# Patient Record
Sex: MALE | Race: OTHER
[De-identification: names, ages, dates, MRNs, and addresses within clinical notes are randomized per-mention and may not be internally consistent; named-entity substitution may affect disease eponyms.]

---

## 2017-01-04 ENCOUNTER — HOSPITAL ENCOUNTER (OUTPATIENT)
Age: 27
End: 2017-01-04

## 2017-01-06 ENCOUNTER — HOSPITAL ENCOUNTER (OUTPATIENT)
Age: 27
Discharge: ANOTHER HOSPITAL | End: 2017-01-06

## 2017-01-06 ENCOUNTER — HOSPITAL ENCOUNTER (INPATIENT)
Age: 27
LOS: 4 days | Discharge: HOME | DRG: 641 | End: 2017-01-10
Payer: MEDICAID

## 2017-01-06 DIAGNOSIS — E86.0: ICD-10-CM

## 2017-01-06 DIAGNOSIS — I44.1: ICD-10-CM

## 2017-01-06 DIAGNOSIS — E66.3: ICD-10-CM

## 2017-01-06 DIAGNOSIS — N18.9: ICD-10-CM

## 2017-01-06 DIAGNOSIS — Z91.128: ICD-10-CM

## 2017-01-06 DIAGNOSIS — F17.210: ICD-10-CM

## 2017-01-06 DIAGNOSIS — F41.8: ICD-10-CM

## 2017-01-06 DIAGNOSIS — Z59.0: ICD-10-CM

## 2017-01-06 DIAGNOSIS — E87.6: Primary | ICD-10-CM

## 2017-01-06 DIAGNOSIS — T50.996A: ICD-10-CM

## 2017-01-06 DIAGNOSIS — E87.1: ICD-10-CM

## 2017-01-06 DIAGNOSIS — N17.9: ICD-10-CM

## 2017-01-06 SDOH — ECONOMIC STABILITY - HOUSING INSECURITY: HOMELESSNESS: Z59.0

## 2017-01-31 ENCOUNTER — HOSPITAL ENCOUNTER (OUTPATIENT)
Age: 27
Discharge: ANOTHER HOSPITAL | End: 2017-01-31
Payer: MEDICAID

## 2017-02-24 ENCOUNTER — HOSPITAL ENCOUNTER (INPATIENT)
Age: 27
LOS: 2 days | Discharge: HOME | DRG: 641 | End: 2017-02-26
Payer: MEDICAID

## 2017-02-24 DIAGNOSIS — D72.829: ICD-10-CM

## 2017-02-24 DIAGNOSIS — Z59.0: ICD-10-CM

## 2017-02-24 DIAGNOSIS — Z91.14: ICD-10-CM

## 2017-02-24 DIAGNOSIS — F34.1: ICD-10-CM

## 2017-02-24 DIAGNOSIS — E87.6: Primary | ICD-10-CM

## 2017-02-24 DIAGNOSIS — Z87.891: ICD-10-CM

## 2017-02-24 SDOH — ECONOMIC STABILITY - HOUSING INSECURITY: HOMELESSNESS: Z59.0

## 2017-03-11 ENCOUNTER — HOSPITAL ENCOUNTER (INPATIENT)
Age: 27
Discharge: LEFT BEFORE DISCHARGE | DRG: 700 | End: 2017-03-11
Payer: MEDICAID
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
## 2017-03-11 DIAGNOSIS — Z91.19: ICD-10-CM

## 2017-03-11 DIAGNOSIS — T50.3X6A: ICD-10-CM

## 2017-03-11 DIAGNOSIS — F17.210: ICD-10-CM

## 2017-03-11 DIAGNOSIS — F12.10: ICD-10-CM

## 2017-03-11 DIAGNOSIS — Z59.0: ICD-10-CM

## 2017-03-11 DIAGNOSIS — F41.9: ICD-10-CM

## 2017-03-11 DIAGNOSIS — N25.89: Primary | ICD-10-CM

## 2017-03-11 DIAGNOSIS — Z79.899: ICD-10-CM

## 2017-03-11 DIAGNOSIS — F32.9: ICD-10-CM

## 2017-03-11 DIAGNOSIS — F60.9: ICD-10-CM

## 2017-03-11 DIAGNOSIS — Z91.138: ICD-10-CM

## 2017-03-11 SDOH — ECONOMIC STABILITY - HOUSING INSECURITY: HOMELESSNESS: Z59.0

## 2017-03-19 ENCOUNTER — HOSPITAL ENCOUNTER (INPATIENT)
Age: 27
LOS: 1 days | Discharge: HOME | DRG: 700 | End: 2017-03-20
Payer: MEDICAID

## 2017-03-19 DIAGNOSIS — Z91.138: ICD-10-CM

## 2017-03-19 DIAGNOSIS — F41.9: ICD-10-CM

## 2017-03-19 DIAGNOSIS — T50.3X6A: ICD-10-CM
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
## 2017-03-19 DIAGNOSIS — F17.200: ICD-10-CM

## 2017-03-19 DIAGNOSIS — F32.9: ICD-10-CM

## 2017-03-19 DIAGNOSIS — D72.829: ICD-10-CM

## 2017-03-19 DIAGNOSIS — Z59.0: ICD-10-CM

## 2017-03-19 DIAGNOSIS — N25.89: Primary | ICD-10-CM

## 2017-03-19 SDOH — ECONOMIC STABILITY - HOUSING INSECURITY: HOMELESSNESS: Z59.0

## 2017-03-30 ENCOUNTER — HOSPITAL ENCOUNTER (OUTPATIENT)
Age: 27
Setting detail: OBSERVATION
LOS: 1 days | Discharge: LEFT BEFORE DISCHARGE | End: 2017-03-31
Payer: MEDICAID

## 2017-03-30 DIAGNOSIS — F17.200: ICD-10-CM

## 2017-03-30 DIAGNOSIS — F41.9: ICD-10-CM

## 2017-03-30 DIAGNOSIS — E26.81: ICD-10-CM

## 2017-03-30 DIAGNOSIS — E87.6: Primary | ICD-10-CM

## 2017-03-30 DIAGNOSIS — Z59.0: ICD-10-CM

## 2017-03-30 DIAGNOSIS — Z53.20: ICD-10-CM

## 2017-03-30 DIAGNOSIS — F33.3: ICD-10-CM

## 2017-03-30 PROCEDURE — 93005 ELECTROCARDIOGRAM TRACING: CPT

## 2017-03-30 PROCEDURE — 96365 THER/PROPH/DIAG IV INF INIT: CPT

## 2017-03-30 PROCEDURE — 85025 COMPLETE CBC W/AUTO DIFF WBC: CPT

## 2017-03-30 PROCEDURE — 96372 THER/PROPH/DIAG INJ SC/IM: CPT

## 2017-03-30 PROCEDURE — 80048 BASIC METABOLIC PNL TOTAL CA: CPT

## 2017-03-30 PROCEDURE — 99284 EMERGENCY DEPT VISIT MOD MDM: CPT

## 2017-03-30 PROCEDURE — 80053 COMPREHEN METABOLIC PANEL: CPT

## 2017-03-30 PROCEDURE — 82550 ASSAY OF CK (CPK): CPT

## 2017-03-30 PROCEDURE — 85027 COMPLETE CBC AUTOMATED: CPT

## 2017-03-30 PROCEDURE — 81003 URINALYSIS AUTO W/O SCOPE: CPT

## 2017-03-30 PROCEDURE — 96366 THER/PROPH/DIAG IV INF ADDON: CPT

## 2017-03-30 PROCEDURE — 83690 ASSAY OF LIPASE: CPT

## 2017-03-30 PROCEDURE — 99285 EMERGENCY DEPT VISIT HI MDM: CPT

## 2017-03-30 PROCEDURE — 83735 ASSAY OF MAGNESIUM: CPT

## 2017-03-30 PROCEDURE — 93010 ELECTROCARDIOGRAM REPORT: CPT

## 2017-03-30 PROCEDURE — 84100 ASSAY OF PHOSPHORUS: CPT

## 2017-03-30 PROCEDURE — 96375 TX/PRO/DX INJ NEW DRUG ADDON: CPT

## 2017-03-30 PROCEDURE — 80306 DRUG TEST PRSMV INSTRMNT: CPT

## 2017-03-30 PROCEDURE — 36415 COLL VENOUS BLD VENIPUNCTURE: CPT

## 2017-03-30 SDOH — ECONOMIC STABILITY - HOUSING INSECURITY: HOMELESSNESS: Z59.0

## 2017-04-03 ENCOUNTER — HOSPITAL ENCOUNTER (OUTPATIENT)
Age: 27
Discharge: ANOTHER HOSPITAL | End: 2017-04-03
Payer: MEDICAID

## 2017-04-03 ENCOUNTER — HOSPITAL ENCOUNTER (INPATIENT)
Age: 27
Discharge: LEFT BEFORE DISCHARGE | DRG: 700 | End: 2017-04-03
Payer: MEDICAID

## 2017-04-03 DIAGNOSIS — N25.89: Primary | ICD-10-CM

## 2017-04-03 DIAGNOSIS — T50.3X6A: ICD-10-CM

## 2017-04-03 DIAGNOSIS — F41.9: ICD-10-CM

## 2017-04-03 DIAGNOSIS — F17.200: ICD-10-CM

## 2017-04-03 DIAGNOSIS — F12.10: ICD-10-CM

## 2017-04-03 DIAGNOSIS — M79.604: ICD-10-CM

## 2017-04-03 DIAGNOSIS — F32.9: ICD-10-CM
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
## 2017-04-03 DIAGNOSIS — M79.605: Primary | ICD-10-CM

## 2017-04-03 DIAGNOSIS — Z79.899: ICD-10-CM

## 2017-04-03 DIAGNOSIS — Z91.19: ICD-10-CM

## 2017-04-03 DIAGNOSIS — Z59.0: ICD-10-CM

## 2017-04-03 DIAGNOSIS — R29.898: ICD-10-CM

## 2017-04-03 SDOH — ECONOMIC STABILITY - HOUSING INSECURITY: HOMELESSNESS: Z59.0

## 2017-05-02 ENCOUNTER — HOSPITAL ENCOUNTER (INPATIENT)
Age: 27
LOS: 1 days | Discharge: HOME | DRG: 699 | End: 2017-05-03
Payer: MEDICAID

## 2017-05-02 DIAGNOSIS — N25.89: Primary | ICD-10-CM

## 2017-05-02 DIAGNOSIS — Z91.138: ICD-10-CM

## 2017-05-02 DIAGNOSIS — E26.81: ICD-10-CM

## 2017-05-02 DIAGNOSIS — T50.0X6A: ICD-10-CM

## 2017-05-02 DIAGNOSIS — Z87.891: ICD-10-CM

## 2017-05-02 DIAGNOSIS — T50.3X6A: ICD-10-CM

## 2017-05-02 DIAGNOSIS — F41.8: ICD-10-CM

## 2017-05-02 DIAGNOSIS — Z59.0: ICD-10-CM

## 2017-05-02 DIAGNOSIS — M62.82: ICD-10-CM

## 2017-05-02 DIAGNOSIS — R29.0: ICD-10-CM

## 2017-05-02 SDOH — ECONOMIC STABILITY - HOUSING INSECURITY: HOMELESSNESS: Z59.0

## 2017-05-10 ENCOUNTER — HOSPITAL ENCOUNTER (OUTPATIENT)
Dept: HOSPITAL 76 - EMS | Age: 27
Discharge: ANOTHER HOSPITAL | End: 2017-05-10
Attending: SURGERY
Payer: MEDICAID

## 2017-05-10 ENCOUNTER — HOSPITAL ENCOUNTER (INPATIENT)
Age: 27
LOS: 1 days | Discharge: LEFT BEFORE DISCHARGE | DRG: 641 | End: 2017-05-11
Payer: MEDICAID

## 2017-05-10 DIAGNOSIS — Z91.14: ICD-10-CM

## 2017-05-10 DIAGNOSIS — F32.9: ICD-10-CM

## 2017-05-10 DIAGNOSIS — Z87.891: ICD-10-CM

## 2017-05-10 DIAGNOSIS — R25.2: ICD-10-CM

## 2017-05-10 DIAGNOSIS — F41.9: ICD-10-CM

## 2017-05-10 DIAGNOSIS — F39: ICD-10-CM

## 2017-05-10 DIAGNOSIS — R07.9: Primary | ICD-10-CM

## 2017-05-10 DIAGNOSIS — E87.6: Primary | ICD-10-CM

## 2017-05-10 DIAGNOSIS — N25.89: ICD-10-CM

## 2017-05-10 DIAGNOSIS — D72.829: ICD-10-CM

## 2017-05-10 DIAGNOSIS — Z91.19: ICD-10-CM

## 2017-05-10 DIAGNOSIS — Z59.0: ICD-10-CM

## 2017-05-10 SDOH — ECONOMIC STABILITY - HOUSING INSECURITY: HOMELESSNESS: Z59.0

## 2017-05-17 ENCOUNTER — HOSPITAL ENCOUNTER (OUTPATIENT)
Dept: HOSPITAL 76 - EMS | Age: 27
End: 2017-05-17
Attending: SURGERY
Payer: MEDICAID

## 2017-05-17 DIAGNOSIS — Z03.89: Primary | ICD-10-CM

## 2017-05-23 ENCOUNTER — HOSPITAL ENCOUNTER (OUTPATIENT)
Dept: HOSPITAL 76 - ED | Age: 27
Setting detail: OBSERVATION
LOS: 1 days | Discharge: LEFT BEFORE DISCHARGE | End: 2017-05-24
Attending: INTERNAL MEDICINE | Admitting: SPECIALIST
Payer: MEDICAID

## 2017-05-23 DIAGNOSIS — Z82.49: ICD-10-CM

## 2017-05-23 DIAGNOSIS — Z87.891: ICD-10-CM

## 2017-05-23 DIAGNOSIS — N25.89: Primary | ICD-10-CM

## 2017-05-23 DIAGNOSIS — Z53.20: ICD-10-CM

## 2017-05-23 DIAGNOSIS — F32.9: ICD-10-CM

## 2017-05-23 DIAGNOSIS — Z83.3: ICD-10-CM

## 2017-05-23 DIAGNOSIS — Z59.0: ICD-10-CM

## 2017-05-23 DIAGNOSIS — Z91.14: ICD-10-CM

## 2017-05-23 DIAGNOSIS — F34.0: ICD-10-CM

## 2017-05-23 DIAGNOSIS — F41.9: ICD-10-CM

## 2017-05-23 LAB
ALBUMIN/GLOB SERPL: 1 {RATIO} (ref 1–2.2)
ANION GAP SERPL CALCULATED.4IONS-SCNC: 10 MMOL/L (ref 6–13)
ANION GAP SERPL CALCULATED.4IONS-SCNC: 12 MMOL/L (ref 6–13)
BASOPHILS NFR BLD AUTO: 0.1 10^3/UL (ref 0–0.1)
BASOPHILS NFR BLD AUTO: 0.4 %
BILIRUB BLD-MCNC: 0.3 MG/DL (ref 0.2–1)
BUN SERPL-MCNC: 12 MG/DL (ref 6–20)
CALCIUM UR-MCNC: 8.8 MG/DL (ref 8.5–10.3)
CHLORIDE SERPL-SCNC: 97 MMOL/L (ref 101–111)
CHLORIDE SERPL-SCNC: 98 MMOL/L (ref 101–111)
CO2 SERPL-SCNC: 25 MMOL/L (ref 21–32)
CO2 SERPL-SCNC: 26 MMOL/L (ref 21–32)
CREAT SERPLBLD-SCNC: 1 MG/DL (ref 0.6–1.2)
CUL URINE ADD CHARGE: (no result)
EOSINOPHIL # BLD AUTO: 0.5 10^3/UL (ref 0–0.7)
EOSINOPHIL NFR BLD AUTO: 2.6 %
ERYTHROCYTE [DISTWIDTH] IN BLOOD BY AUTOMATED COUNT: 13.7 % (ref 12–15)
GFRSERPLBLD MDRD-ARVRAT: 88 ML/MIN/{1.73_M2} (ref 89–?)
GLOBULIN SER-MCNC: 3.9 G/DL (ref 2.1–4.2)
GLUCOSE SERPL-MCNC: 104 MG/DL (ref 70–100)
HCT VFR BLD AUTO: 48.2 % (ref 42–52)
HGB UR QL STRIP: 16.9 G/DL (ref 14–18)
LIPASE SERPL-CCNC: 49 U/L (ref 22–51)
LYMPHOCYTES # SPEC AUTO: 3.1 10^3/UL (ref 1.5–3.5)
LYMPHOCYTES NFR BLD AUTO: 16.2 %
MAGNESIUM SERPL-MCNC: 2.1 MG/DL (ref 1.7–2.8)
MCH RBC QN AUTO: 29.1 PG (ref 27–31)
MCHC RBC AUTO-ENTMCNC: 35 G/DL (ref 32–36)
MCV RBC AUTO: 83.2 FL (ref 80–94)
MONOCYTES # BLD AUTO: 1.2 10^3/UL (ref 0–1)
MONOCYTES NFR BLD AUTO: 6.3 %
NEUTROPHILS # BLD AUTO: 14.1 10^3/UL (ref 1.5–6.6)
NEUTROPHILS # SNV AUTO: 18.9 X10^3/UL (ref 4.8–10.8)
NEUTROPHILS NFR BLD AUTO: 74.5 %
NRBC # BLD AUTO: 0 /100WBC
PDW BLD AUTO: 7.4 FL (ref 7.4–11.4)
PH UR STRIP.AUTO: 7 PH (ref 5–7.5)
PHOSPHATE BLD-MCNC: 1.5 MG/DL (ref 2.5–4.6)
POTASSIUM SERPL-SCNC: 1.9 MMOL/L (ref 3.5–5)
POTASSIUM SERPL-SCNC: < 1.5 MMOL/L (ref 3.5–5)
PROT SPEC-MCNC: 7.9 G/DL (ref 6.7–8.2)
RBC MAR: 5.79 10^6/UL (ref 4.7–6.1)
SODIUM SERPLBLD-SCNC: 133 MMOL/L (ref 135–145)
SODIUM SERPLBLD-SCNC: 135 MMOL/L (ref 135–145)
SP GR UR STRIP.AUTO: 1.01 (ref 1–1.03)
UR CULTURE IF IND: (no result)
UROBILINOGEN UR STRIP.AUTO-MCNC: NEGATIVE MG/DL
WBC # BLD: 18.9 X10^3/UL
WBC # UR MANUAL: (no result) /HPF (ref 0–3)

## 2017-05-23 PROCEDURE — 84100 ASSAY OF PHOSPHORUS: CPT

## 2017-05-23 PROCEDURE — 80051 ELECTROLYTE PANEL: CPT

## 2017-05-23 PROCEDURE — 82550 ASSAY OF CK (CPK): CPT

## 2017-05-23 PROCEDURE — 83735 ASSAY OF MAGNESIUM: CPT

## 2017-05-23 PROCEDURE — 83690 ASSAY OF LIPASE: CPT

## 2017-05-23 PROCEDURE — 96365 THER/PROPH/DIAG IV INF INIT: CPT

## 2017-05-23 PROCEDURE — 96376 TX/PRO/DX INJ SAME DRUG ADON: CPT

## 2017-05-23 PROCEDURE — 99285 EMERGENCY DEPT VISIT HI MDM: CPT

## 2017-05-23 PROCEDURE — 82040 ASSAY OF SERUM ALBUMIN: CPT

## 2017-05-23 PROCEDURE — 87150 DNA/RNA AMPLIFIED PROBE: CPT

## 2017-05-23 PROCEDURE — 80053 COMPREHEN METABOLIC PANEL: CPT

## 2017-05-23 PROCEDURE — 99284 EMERGENCY DEPT VISIT MOD MDM: CPT

## 2017-05-23 PROCEDURE — 93005 ELECTROCARDIOGRAM TRACING: CPT

## 2017-05-23 PROCEDURE — 85025 COMPLETE CBC W/AUTO DIFF WBC: CPT

## 2017-05-23 PROCEDURE — 81001 URINALYSIS AUTO W/SCOPE: CPT

## 2017-05-23 PROCEDURE — 82553 CREATINE MB FRACTION: CPT

## 2017-05-23 PROCEDURE — 96367 TX/PROPH/DG ADDL SEQ IV INF: CPT

## 2017-05-23 PROCEDURE — 96366 THER/PROPH/DIAG IV INF ADDON: CPT

## 2017-05-23 PROCEDURE — 96375 TX/PRO/DX INJ NEW DRUG ADDON: CPT

## 2017-05-23 PROCEDURE — 80048 BASIC METABOLIC PNL TOTAL CA: CPT

## 2017-05-23 PROCEDURE — 36415 COLL VENOUS BLD VENIPUNCTURE: CPT

## 2017-05-23 PROCEDURE — 87086 URINE CULTURE/COLONY COUNT: CPT

## 2017-05-23 RX ADMIN — SPIRONOLACTONE SCH MG: 25 TABLET, FILM COATED ORAL at 21:57

## 2017-05-23 RX ADMIN — POTASSIUM CITRATE SCH MEQ: 5 TABLET ORAL at 21:59

## 2017-05-23 RX ADMIN — CITALOPRAM HYDROBROMIDE SCH MG: 10 TABLET ORAL at 21:56

## 2017-05-23 RX ADMIN — PROPRANOLOL HYDROCHLORIDE SCH MG: 10 TABLET ORAL at 21:56

## 2017-05-23 RX ADMIN — SODIUM CHLORIDE, PRESERVATIVE FREE SCH ML: 5 INJECTION INTRAVENOUS at 21:59

## 2017-05-23 RX ADMIN — POTASSIUM CHLORIDE SCH MLS/HR: 7.46 INJECTION, SOLUTION INTRAVENOUS at 22:14

## 2017-05-23 RX ADMIN — POTASSIUM CHLORIDE SCH MLS/HR: 7.46 INJECTION, SOLUTION INTRAVENOUS at 23:12

## 2017-05-23 SDOH — ECONOMIC STABILITY - HOUSING INSECURITY: HOMELESSNESS: Z59.0

## 2017-05-23 NOTE — ED PHYSICIAN DOCUMENTATION
History of Present Illness





- Stated complaint


Stated Complaint: LEG WEAKNESS





- Chief complaint


Chief Complaint: General





- History obtained from


History obtained from: Patient





- History of Present Illness


Timing: Other (27-year-old gentleman with potassium wasting renal tubular 

acidosis presents again with leg weakness and pain consistent with low 

potassium.  He hasn't taken any home potassium today and missed one dose 

yesterday he says.  He's had some loose stools without overt diarrhea which may 

have been contributory.)





Review of Systems


Ten Systems: 10 systems reviewed and negative


Constitutional: denies: Fever, Chills


Cardiac: denies: Chest pain / pressure, Palpitations


Respiratory: denies: Dyspnea, Cough





PD PAST MEDICAL HISTORY





- Past Medical History


Cardiovascular: None


Respiratory: None


Neuro: None


Endocrine/Autoimmune: None


GI: None


: None


HEENT: None


Psych: Depression, Anxiety


Musculoskeletal: Fatigue


Derm: None, Other


Other Past Medical History: Chronic low potassium from renal tubular acidocis





- Past Surgical History


Past Surgical History: No





- Present Medications


Home Medications: 


 Ambulatory Orders











 Medication  Instructions  Recorded  Confirmed


 


Trazodone HCl 50 mg PO QPM PRN 06/13/16 05/23/17


 


Risperidone [Risperdal] 2 mg PO QPM 30 Days 02/26/17 05/23/17


 


Spironolactone 25 mg PO BID 30 Days 03/20/17 05/23/17


 


Buspirone HCl 5 mg PO BID 05/03/17 05/23/17


 


Citalopram Hydrobromide 20 mg PO BID 05/03/17 05/23/17





[Citalopram HBr]   


 


HYDROcod/ACETAM 5/325 [Norco 5/325] 1 tab PO Q6HR PRN 05/03/17 05/23/17


 


Lorazepam 0.5 mg PO TID PRN 05/03/17 05/23/17


 


Propranolol HCl 10 mg PO BID 05/03/17 05/23/17


 


Potassium Chloride 120 meq PO QID 05/11/17 05/23/17














- Allergies


Allergies/Adverse Reactions: 


 Allergies











Allergy/AdvReac Type Severity Reaction Status Date / Time


 


No Known Drug Allergies Allergy   Verified 03/30/17 21:50














- Social History


Does the pt smoke?: Yes


Smoking Status: Former smoker


Does the pt drink ETOH?: No


Does the pt have substance abuse?: Yes





- Family History


Family history: reports: Non contributory





- Immunizations


Immunizations are current?: Yes





- POLST


Patient has POLST: No


POLST Status: Full Code





PD ED PE NORMAL





- Vitals


Vital signs reviewed: Yes





- General


General: Alert and oriented X 3, No acute distress





- HEENT


HEENT: PERRL, EOMI





- Neck


Neck: Supple, no meningeal sign, No bony TTP





- Cardiac


Cardiac: RRR, No murmur





- Respiratory


Respiratory: No respiratory distress, Clear bilaterally





- Abdomen


Abdomen: Normal bowel sounds, Soft, Non tender





- Back


Back: No CVA TTP, No spinal TTP





- Derm


Derm: Normal color, Warm and dry





- Extremities


Extremities: No deformity, No tenderness to palpate





- Neuro


Neuro: Alert and oriented X 3, Normal speech





- Psych


Psych: Normal mood, Normal affect





Results





- Vitals


Vitals: 


 Vital Signs - 24 hr











  05/23/17 05/23/17 05/23/17





  17:07 17:45 19:13


 


Temperature 36.5 C  


 


Heart Rate 65 100 88


 


Respiratory 14 20 16





Rate   


 


Blood Pressure 119/69 120/73 117/67


 


O2 Saturation 99 97 96








 Oxygen











O2 Source                      Room air

















- Labs


Labs: 


 Laboratory Tests











  05/23/17 05/23/17





  17:30 17:30


 


WBC  18.9 H 


 


RBC  5.79 


 


Hgb  16.9 


 


Hct  48.2 


 


MCV  83.2 


 


MCH  29.1 


 


MCHC  35.0 


 


RDW  13.7 


 


Plt Count  391 


 


MPV  7.4 


 


Neut #  14.1 H 


 


Lymph #  3.1 


 


Mono #  1.2 H 


 


Eos #  0.5 


 


Baso #  0.1 


 


Absolute Nucleated RBC  0.00 


 


Nucleated RBCs  0.0 


 


Sodium   133 L


 


Potassium   < 1.5 L*


 


Chloride   98 L


 


Carbon Dioxide   25


 


Anion Gap   10.0


 


BUN   12


 


Creatinine   1.0


 


Estimated GFR (MDRD)   88 L


 


Glucose   104 H


 


Calcium   8.8


 


Phosphorus   1.5 L


 


Magnesium   2.1


 


Total Bilirubin   0.3


 


AST   25


 


ALT   44


 


Alkaline Phosphatase   84


 


Total Protein   7.9


 


Albumin   4.0


 


Globulin   3.9


 


Albumin/Globulin Ratio   1.0


 


Lipase   49














PD MEDICAL DECISION MAKING





- ED course


ED course: 





27-year-old gentleman with recurrent severe hypokalemia at a level mandating 

hospitalization.  Potassium was repleted both orally and IV here.





- Consults


Consults: Consulted (name) (Spoke with Dr. Jones at 740 p.m. for admission)





Departure





- Departure


Disposition: [facility] DC/Xfer


Clinical Impression: 


 Hypokalemia


Condition: Serious

## 2017-05-24 VITALS — DIASTOLIC BLOOD PRESSURE: 33 MMHG | SYSTOLIC BLOOD PRESSURE: 138 MMHG

## 2017-05-24 LAB
ANION GAP SERPL CALCULATED.4IONS-SCNC: 11 MMOL/L (ref 6–13)
ANION GAP SERPL CALCULATED.4IONS-SCNC: 9 MMOL/L (ref 6–13)
BUN SERPL-MCNC: 13 MG/DL (ref 6–20)
CALCIUM UR-MCNC: 8.3 MG/DL (ref 8.5–10.3)
CHLORIDE SERPL-SCNC: 96 MMOL/L (ref 101–111)
CHLORIDE SERPL-SCNC: 99 MMOL/L (ref 101–111)
CO2 SERPL-SCNC: 28 MMOL/L (ref 21–32)
CO2 SERPL-SCNC: 30 MMOL/L (ref 21–32)
CREAT SERPLBLD-SCNC: 1.1 MG/DL (ref 0.6–1.2)
GFRSERPLBLD MDRD-ARVRAT: 79 ML/MIN/{1.73_M2} (ref 89–?)
GLUCOSE SERPL-MCNC: 110 MG/DL (ref 70–100)
MAGNESIUM SERPL-MCNC: 2.2 MG/DL (ref 1.7–2.8)
PHOSPHATE BLD-MCNC: 3.6 MG/DL (ref 2.5–4.6)
POTASSIUM SERPL-SCNC: 2.1 MMOL/L (ref 3.5–5)
POTASSIUM SERPL-SCNC: 3 MMOL/L (ref 3.5–5)
SODIUM SERPLBLD-SCNC: 135 MMOL/L (ref 135–145)
SODIUM SERPLBLD-SCNC: 138 MMOL/L (ref 135–145)

## 2017-05-24 RX ADMIN — POTASSIUM CITRATE SCH MEQ: 5 TABLET ORAL at 00:28

## 2017-05-24 RX ADMIN — POTASSIUM CHLORIDE SCH: 7.46 INJECTION, SOLUTION INTRAVENOUS at 08:31

## 2017-05-24 RX ADMIN — POTASSIUM CHLORIDE SCH MLS/HR: 7.46 INJECTION, SOLUTION INTRAVENOUS at 01:25

## 2017-05-24 RX ADMIN — SODIUM CHLORIDE, PRESERVATIVE FREE SCH ML: 5 INJECTION INTRAVENOUS at 05:53

## 2017-05-24 RX ADMIN — SODIUM CHLORIDE, PRESERVATIVE FREE PRN ML: 5 INJECTION INTRAVENOUS at 04:24

## 2017-05-24 RX ADMIN — POTASSIUM CHLORIDE SCH MLS/HR: 7.46 INJECTION, SOLUTION INTRAVENOUS at 00:13

## 2017-05-24 RX ADMIN — POTASSIUM CHLORIDE SCH MLS/HR: 7.46 INJECTION, SOLUTION INTRAVENOUS at 07:16

## 2017-05-24 RX ADMIN — CITALOPRAM HYDROBROMIDE SCH MG: 10 TABLET ORAL at 08:29

## 2017-05-24 RX ADMIN — SODIUM CHLORIDE, PRESERVATIVE FREE PRN ML: 5 INJECTION INTRAVENOUS at 02:33

## 2017-05-24 RX ADMIN — POTASSIUM CITRATE SCH: 5 TABLET ORAL at 05:48

## 2017-05-24 RX ADMIN — POTASSIUM CHLORIDE SCH MLS/HR: 7.46 INJECTION, SOLUTION INTRAVENOUS at 05:52

## 2017-05-24 RX ADMIN — SPIRONOLACTONE SCH MG: 25 TABLET, FILM COATED ORAL at 08:29

## 2017-05-24 RX ADMIN — POTASSIUM CITRATE SCH MEQ: 5 TABLET ORAL at 08:29

## 2017-05-24 RX ADMIN — PROPRANOLOL HYDROCHLORIDE SCH MG: 10 TABLET ORAL at 08:30

## 2017-05-24 RX ADMIN — POTASSIUM CITRATE SCH MEQ: 5 TABLET ORAL at 02:45

## 2017-05-24 RX ADMIN — POTASSIUM CITRATE SCH: 5 TABLET ORAL at 05:50

## 2017-05-24 NOTE — HISTORY & PHYSICAL EXAMINATION
DATE OF ADMISSION: 05/23/2017

 

PRIMARY CARE PROVIDER: JOSSUE Arroyo. 

 

CHIEF COMPLAINT: Weakness, muscle discomfort.

 

HISTORY OF PRESENT ILLNESS: This is a 27-year-old male who has a history of renal tubular acidosis ty
pe 2 with severe recurrence of hyperkalemia. It is not clear whether the patient is compliant with hi
s medications, according to previous notes he is not. He was last here on 05/10/2017 for a similar ad
mission. He presents now with a potassium level less than 1.5, sodium 133, bicarbonate 25, BUN 12, cr
eatinine 1.0, calcium is 8.8, phosphorus is 1.5, magnesium 2.1. CPK is ordered by myself and pending 
at the time of this dictation. He has had rhabdomyolysis in the past also. Denies any chest pain or s
hortness of breath. He also typically presents with leukocytosis, which he does again with a white co
unt of 18.9. Urinalysis was ordered by myself and is pending at the time of this dictation. The patie
nt denies any fevers, chills.

 

PAST MEDICAL HISTORY

1. Renal tubular acidosis type 2 with severe hyperkalemia, recurrence.

2. Noncompliance with medication regimen.

3. Depression.

4. Anxiety.

5. Homelessness.

6. Tobacco abuse.

7. Marijuana abuse.

 

MEDICATIONS UPON ADMISSION

1. BuSpar 5 mg p.o. b.i.d.

2. Celexa 20 mg p.o. b.i.d.

3. Vicodin 1 tab p.o. q.6h. p.r.n. pain.

4. Lorazepam 0.5 mg p.o. t.i.d. p.r.n. anxiety.

5. KCl 120 mEq p.o. q.i.d.

6. Propranolol 10 mg p.o. b.i.d.

7. Risperdal 2 mg p.o. q.p.m.

8. Spironolactone 25 mg p.o. b.i.d.

9. Trazodone 50 mg p.o. q.p.m.

 

ALLERGIES: NO KNOWN DRUG ALLERGIES.

 

SOCIAL HISTORY: Homeless. Smoking, smokes marijuana. Alcohol, none. 

 

FAMILY MEDICAL HISTORY: A cousin who has a history of [place] disease. Mother with a history of hype
rtension and depression. Father with a history of diabetes and mental illness. Father and grandmother
 have coronary artery disease.

 

REVIEW OF SYSTEMS: Does admit to some nausea. Denies any vomiting. Denies alteration in bowel movemen
ts. All other review of systems are reviewed and are negative except for as in HPI.

 

PHYSICAL EXAMINATION

VITAL SIGNS: Temperature is afebrile, heart rate 88, respiratory rate 16, blood pressure 117/67. Room
 air sat 96-99%.

CONSTITUTIONAL: Young male in no acute distress.

HEAD: Normocephalic, atraumatic.

EYES: PERRLA-DC, EOMI.

MOUTH: No lesions.

NECK: No adenopathy.

CHEST: Clear to auscultation.

COR: Regular rate and rhythm, S1, S2 without murmur.

ABDOMEN: Soft, nontender. Bowel sounds present.

EXTREMITIES: No pedal edema.

SKIN: No rashes.

PSYCHIATRIC: Mood and affect are appropriate.

NEUROLOGIC: Alert and oriented x3. Motor strength is intact bilaterally.

 

LABORATORY: As above. EKG, I cannot locate one, and hence is ordered and is pending at the time of 
is dictation. White count 18.9, hemoglobin 16.9, hematocrit 48.2, platelets 391, neutrophils 14.1, so
dium 133, potassium less than 1.5, chloride 98, bicarbonate 25, BUN 12, creatinine 1.0, calculated GF
R 88, glucose 104, calcium 8.8, phosphorus 1.5, magnesium 2.1, total bilirubin 0.3, AST 25, ALT 44, a
lkaline phosphatase 84, total protein 7.9, albumin 4.0, lipase 49. As mentioned above, CPK with CK-MB
 fraction is pending at the time of this dictation as ordered by myself.

 

ASSESSMENT AND PLAN

1. Acute on chronic hypokalemia secondary to renal tubular acidosis type 2, present on admission. He 
will be given oral and IV potassium supplementation. We will get electrolytes q.4h. We will also make
 sure he does not have rhabdomyolysis and we will also replete phosphate with IV potassium phosphate.
 We will also recheck basic metabolic panel, magnesium, phosphate in a.m. We will place in ICU given 
severe hyponatremia and we will also place on telemetry.

2. Anxiety, depression, personality affective disorder. Continue his current home medication regimen.


3. Leukocytosis. Appears to be acute. Has had previous admissions with hypokalemia. We will monitor, 
check urine results. Is afebrile.

4. Deep venous thrombosis prophylaxis. We will use SCDs and subcutaneous prophylactic Lovenox.

5. Code status. The patient is FULL CODE.

 

TIME SPENT: 60 minutes.

 

 

 

DD:05/23/2017 20:52:00  DT: 05/24/2017 03:20  JOB #: [id number]  EXT JOB #:[id number]

## 2017-05-24 NOTE — DISCHARGE SUMMARY
DATE OF ADMISSION: 05/23/2017

 

DATE OF DISCHARGE: 05/24/2017

 

PRIMARY CARE PHYSICIAN: Tori Kaur PA-C

 

ADMISSION DIAGNOSES:

1. Acute on chronic hypokalemia secondary to renal tubular acidosis type 2.

2. Anxiety, depression, personality affective disorder.

3. Leukocytosis, undetermined etiology.

 

DISCHARGE DIAGNOSES:

1. Acute on chronic hypokalemia secondary to tubular acidosis type 2, improved, 
but still severely hypokalemic.

2. Anxiety, depression, personality affective disorder.

3. Leukocytosis without repeat to determine if improved, but no signs of 
infection. Because of its association with previous admissions, considered 
leukocytosis of stress.

 

CONSULTATIONS: None.

 

SPECIAL PROCEDURES: None.

 

HOSPITAL COURSE AND MANAGEMENT: The initial presentation, hospital emergency 
department evaluation, and hospital plan are well described in the history and 
physical, see copy of same. In summary, the patient had oral IV and IV 
replacement overnight and his potassium went from 1.5-1.9 to 3.0 and then 
dropped to 2.1 in the morning hours. The patient was feeling better and was 
feeling stronger and as is typical of his previous behavior during admission, 
he was insistent on leaving even though he was told that his potassium was 
still severe low and needed further replacement and he said he does not like 
hospitals so he was insistent on leaving. Therefore, the patient left A. He 
assured this physician that he had plenty of packets and a new prescription as 
well at [facility]  that he was going to  after discharge on 05/24/2017.

 

EXAMINATION:

VITAL SIGNS: The patient's had a temperature of 36.6, 92, 138/33, 20, 98% room 
air saturation.

EYES: EOM within normal limits. PERRL, nonicteric.

CONSTITUTIONAL: Well-developed, well-nourished, in no acute distress, had just 
completed breakfast.

NECK: Supple. Nontender. No lymphadenopathy. HEART: Normal sinus rhythm. No 
murmur, rubs, clicks.

LUNGS: Have some expiratory wheezes and course breathing. He says he smokes 
marijuana, but not tobacco.

NEUROLOGIC: Cognitive appears at baseline. Motor normal.

 

LABORATORY DATA: White count was 18.9 on admission, 16.9 and 40 hemoglobin and 
hematocrit with 391 platelets. His electrolytes at the time of discharge 138, 
2.1, 99, 30, 13 and 1.1 his creatinine. The patient's glucose is 110. His 
calcium is 3.0. The patient's CK earlier during the hospitalization was 126. 
Albumin is 3.6.

 

ALLERGIES: NO KNOWN ALLERGIES.

 

DISCHARGE MEDICATIONS: Continue medicines she was on previous to admission, 
which include.

1. Trazodone 50 mg q.p.m. p.r.n.

2. Spironolactone 25 mg b.i.d.

3. Risperdal 2 mg q.p.m.

4. Propranolol 10 mg b.i.d.

5. Potassium chloride 120 mg q.i.d., although the patient does much better with 
potassium citrate.

6. Lorazepam 0.5 mg p.o. t.i.d.

7. Hydrocodone 5/325 one tab every 6 hours as needed.

8. Celexa 20 mg twice a day.

9. Buspirone 5 mg b.i.d. 

 

The patient is to have followup with his PCP, Tori Kaur in the next week. 

 

TIME SPENT: Less than 30 minutes. The patient was examined as noted above on 
the day of discharge.

 

 

 

DD:05/24/2017 08:35:00  DT: 05/24/2017 09:18  JOB #: [id number]  EXT JOB #:[id number]

KAY

## 2017-05-24 NOTE — DISCHARGE PLAN
Discharge Plan


Disposition: 01 Home, Self Care


Condition: Serious


Diet: Regular


Activity Restrictions: Activity as Tolerated


Shower Restrictions: No


Driving Restrictions: No


Weight Bearing: Full Weight


Additional Instructions or Follow Up instructions: 


Juan dickens potassium is still not acceptablely replaced. It is at such a low 

level that any person would be admitted at the hospital


or if already in the hospital would be staying until the potassium in the blood 

was a lot higher.


Therefore, it is against my best advice that you are leaving. I understand that 

you have plenty of potassium packets for home use.


I wish you well and if your problems recur please return.





thank you,





 Dr. Jose A Segundo Smoking: If you smoke, Please STOP!  Call [phone] for help.


Follow-up with: 


Tori Kaur PA-C [Primary Care Provider] - 1 Week

## 2017-05-29 ENCOUNTER — HOSPITAL ENCOUNTER (OUTPATIENT)
Dept: HOSPITAL 76 - ED | Age: 27
Setting detail: OBSERVATION
LOS: 2 days | Discharge: HOME | End: 2017-05-31
Attending: SPECIALIST | Admitting: SPECIALIST
Payer: MEDICAID

## 2017-05-29 DIAGNOSIS — F25.9: ICD-10-CM

## 2017-05-29 DIAGNOSIS — N25.89: Primary | ICD-10-CM

## 2017-05-29 DIAGNOSIS — T50.3X6A: ICD-10-CM

## 2017-05-29 DIAGNOSIS — Z91.138: ICD-10-CM

## 2017-05-29 DIAGNOSIS — Z87.891: ICD-10-CM

## 2017-05-29 DIAGNOSIS — Z91.14: ICD-10-CM

## 2017-05-29 DIAGNOSIS — Z59.0: ICD-10-CM

## 2017-05-29 DIAGNOSIS — E66.3: ICD-10-CM

## 2017-05-29 DIAGNOSIS — F41.8: ICD-10-CM

## 2017-05-29 LAB
ALBUMIN/GLOB SERPL: 1 {RATIO} (ref 1–2.2)
ANION GAP SERPL CALCULATED.4IONS-SCNC: 14 MMOL/L (ref 6–13)
BASOPHILS NFR BLD AUTO: 0.1 10^3/UL (ref 0–0.1)
BASOPHILS NFR BLD AUTO: 0.6 %
BILIRUB BLD-MCNC: 0.8 MG/DL (ref 0.2–1)
BUN SERPL-MCNC: 16 MG/DL (ref 6–20)
CALCIUM UR-MCNC: 9.5 MG/DL (ref 8.5–10.3)
CHLORIDE SERPL-SCNC: 96 MMOL/L (ref 101–111)
CO2 SERPL-SCNC: 21 MMOL/L (ref 21–32)
CREAT SERPLBLD-SCNC: 1.3 MG/DL (ref 0.6–1.2)
EOSINOPHIL # BLD AUTO: 0.6 10^3/UL (ref 0–0.7)
EOSINOPHIL NFR BLD AUTO: 2.2 %
ERYTHROCYTE [DISTWIDTH] IN BLOOD BY AUTOMATED COUNT: 13.8 % (ref 12–15)
GFRSERPLBLD MDRD-ARVRAT: 65 ML/MIN/{1.73_M2} (ref 89–?)
GLOBULIN SER-MCNC: 4.4 G/DL (ref 2.1–4.2)
GLUCOSE SERPL-MCNC: 129 MG/DL (ref 70–100)
HCT VFR BLD AUTO: 52.6 % (ref 42–52)
HGB UR QL STRIP: 18.2 G/DL (ref 14–18)
LIPASE SERPL-CCNC: 53 U/L (ref 22–51)
LYMPHOCYTES # SPEC AUTO: 3.5 10^3/UL (ref 1.5–3.5)
LYMPHOCYTES NFR BLD AUTO: 13.1 %
MAGNESIUM SERPL-MCNC: 2.4 MG/DL (ref 1.7–2.8)
MAGNESIUM SERPL-MCNC: 2.5 MG/DL (ref 1.7–2.8)
MCH RBC QN AUTO: 28.4 PG (ref 27–31)
MCHC RBC AUTO-ENTMCNC: 34.6 G/DL (ref 32–36)
MCV RBC AUTO: 82.1 FL (ref 80–94)
MONOCYTES # BLD AUTO: 2.1 10^3/UL (ref 0–1)
MONOCYTES NFR BLD AUTO: 7.9 %
NEUTROPHILS # BLD AUTO: 20.2 10^3/UL (ref 1.5–6.6)
NEUTROPHILS # SNV AUTO: 26.5 X10^3/UL (ref 4.8–10.8)
NEUTROPHILS NFR BLD AUTO: 76.2 %
NRBC # BLD AUTO: 0.2 /100WBC
PDW BLD AUTO: 7.3 FL (ref 7.4–11.4)
PHOSPHATE BLD-MCNC: 1.6 MG/DL (ref 2.5–4.6)
PHOSPHATE BLD-MCNC: 1.8 MG/DL (ref 2.5–4.6)
PLAT MORPH BLD: (no result)
PLATELET BLD QL SMEAR: (no result)
POTASSIUM SERPL-SCNC: < 1.5 MMOL/L (ref 3.5–5)
PROT SPEC-MCNC: 8.9 G/DL (ref 6.7–8.2)
RBC MAR: 6.41 10^6/UL (ref 4.7–6.1)
SODIUM SERPLBLD-SCNC: 131 MMOL/L (ref 135–145)
WBC # BLD: 26.5 X10^3/UL
WBC MORPH BLD: (no result)

## 2017-05-29 PROCEDURE — 83735 ASSAY OF MAGNESIUM: CPT

## 2017-05-29 PROCEDURE — 80053 COMPREHEN METABOLIC PANEL: CPT

## 2017-05-29 PROCEDURE — 96376 TX/PRO/DX INJ SAME DRUG ADON: CPT

## 2017-05-29 PROCEDURE — 96375 TX/PRO/DX INJ NEW DRUG ADDON: CPT

## 2017-05-29 PROCEDURE — 82652 VIT D 1 25-DIHYDROXY: CPT

## 2017-05-29 PROCEDURE — 80048 BASIC METABOLIC PNL TOTAL CA: CPT

## 2017-05-29 PROCEDURE — 83690 ASSAY OF LIPASE: CPT

## 2017-05-29 PROCEDURE — 96367 TX/PROPH/DG ADDL SEQ IV INF: CPT

## 2017-05-29 PROCEDURE — 87086 URINE CULTURE/COLONY COUNT: CPT

## 2017-05-29 PROCEDURE — 96366 THER/PROPH/DIAG IV INF ADDON: CPT

## 2017-05-29 PROCEDURE — 84132 ASSAY OF SERUM POTASSIUM: CPT

## 2017-05-29 PROCEDURE — 96374 THER/PROPH/DIAG INJ IV PUSH: CPT

## 2017-05-29 PROCEDURE — 82550 ASSAY OF CK (CPK): CPT

## 2017-05-29 PROCEDURE — 36415 COLL VENOUS BLD VENIPUNCTURE: CPT

## 2017-05-29 PROCEDURE — 87150 DNA/RNA AMPLIFIED PROBE: CPT

## 2017-05-29 PROCEDURE — 93005 ELECTROCARDIOGRAM TRACING: CPT

## 2017-05-29 PROCEDURE — 99285 EMERGENCY DEPT VISIT HI MDM: CPT

## 2017-05-29 PROCEDURE — 81001 URINALYSIS AUTO W/SCOPE: CPT

## 2017-05-29 PROCEDURE — 96365 THER/PROPH/DIAG IV INF INIT: CPT

## 2017-05-29 PROCEDURE — 82330 ASSAY OF CALCIUM: CPT

## 2017-05-29 PROCEDURE — 82310 ASSAY OF CALCIUM: CPT

## 2017-05-29 PROCEDURE — 82553 CREATINE MB FRACTION: CPT

## 2017-05-29 PROCEDURE — 84100 ASSAY OF PHOSPHORUS: CPT

## 2017-05-29 PROCEDURE — 99283 EMERGENCY DEPT VISIT LOW MDM: CPT

## 2017-05-29 PROCEDURE — 85025 COMPLETE CBC W/AUTO DIFF WBC: CPT

## 2017-05-29 PROCEDURE — 96372 THER/PROPH/DIAG INJ SC/IM: CPT

## 2017-05-29 RX ADMIN — SODIUM CHLORIDE, PRESERVATIVE FREE SCH ML: 5 INJECTION INTRAVENOUS at 22:41

## 2017-05-29 RX ADMIN — SPIRONOLACTONE SCH MG: 25 TABLET, FILM COATED ORAL at 22:43

## 2017-05-29 RX ADMIN — POTASSIUM CHLORIDE SCH MLS/HR: 7.46 INJECTION, SOLUTION INTRAVENOUS at 23:34

## 2017-05-29 RX ADMIN — HYDROCODONE BITARTRATE AND ACETAMINOPHEN PRN TAB: 5; 325 TABLET ORAL at 22:54

## 2017-05-29 RX ADMIN — POTASSIUM CHLORIDE SCH MLS/HR: 7.46 INJECTION, SOLUTION INTRAVENOUS at 22:33

## 2017-05-29 RX ADMIN — POTASSIUM CITRATE SCH MEQ: 5 TABLET ORAL at 22:36

## 2017-05-29 RX ADMIN — PROPRANOLOL HYDROCHLORIDE SCH MG: 10 TABLET ORAL at 22:36

## 2017-05-29 RX ADMIN — POTASSIUM CHLORIDE SCH MEQ: 1500 TABLET, EXTENDED RELEASE ORAL at 22:36

## 2017-05-29 SDOH — ECONOMIC STABILITY - HOUSING INSECURITY: HOMELESSNESS: Z59.0

## 2017-05-29 NOTE — ED PHYSICIAN DOCUMENTATION
History of Present Illness





- Stated complaint


Stated Complaint: WEAKNESS





- Chief complaint


Chief Complaint: General





- History obtained from


History obtained from: Patient





- History of Present Illness


Timing: Today


Pain level max: 5


Pain level now: 4


Improved by: taking his potassium


Worsened by: not taking his potassium





- Additonal information


Additional information: 





Patient is a 27-year-old male with a history of renal tubular acidosis.  He has 

not been taking his potassium.  Feeling weak today.  Muscle aches.





Review of Systems


Ten Systems: 10 systems reviewed and negative


Constitutional: denies: Fever, Chills


Nose: denies: Rhinorrhea / runny nose, Congestion


Throat: denies: Sore throat


Cardiac: denies: Chest pain / pressure


Respiratory: denies: Cough, Wheezing


GI: denies: Vomiting


Skin: denies: Rash


Musculoskeletal: denies: Neck pain, Back pain


Neurologic: denies: Headache





PD PAST MEDICAL HISTORY





- Past Medical History


Cardiovascular: None


Respiratory: None


Neuro: None


Endocrine/Autoimmune: None


GI: None


: None


HEENT: None


Psych: Depression, Anxiety


Musculoskeletal: Fatigue


Derm: None, Other





- Past Surgical History


Past Surgical History: No





- Present Medications


Home Medications: 


 Ambulatory Orders











 Medication  Instructions  Recorded  Confirmed


 


Trazodone HCl 50 mg PO QPM PRN 06/13/16 05/23/17


 


Risperidone [Risperdal] 2 mg PO QPM 30 Days 02/26/17 05/23/17


 


Spironolactone 25 mg PO BID 30 Days 03/20/17 05/23/17


 


Buspirone HCl 5 mg PO BID 05/03/17 05/23/17


 


Citalopram Hydrobromide 20 mg PO BID 05/03/17 05/23/17





[Citalopram HBr]   


 


HYDROcod/ACETAM 5/325 [Norco 5/325] 1 tab PO Q6HR PRN 05/03/17 05/23/17


 


Lorazepam 0.5 mg PO TID PRN 05/03/17 05/23/17


 


Propranolol HCl 10 mg PO BID 05/03/17 05/23/17


 


Potassium Chloride 120 meq PO QID 05/11/17 05/23/17














- Allergies


Allergies/Adverse Reactions: 


 Allergies











Allergy/AdvReac Type Severity Reaction Status Date / Time


 


No Known Drug Allergies Allergy   Verified 05/29/17 19:06














- Social History


Does the pt smoke?: Yes


Smoking Status: Former smoker


Does the pt drink ETOH?: No


Does the pt have substance abuse?: Yes





- Immunizations


Immunizations are current?: Yes





- POLST


Patient has POLST: No


POLST Status: Full Code





PD ED PE NORMAL





- Vitals


Vital signs reviewed: Yes





- General


General: Alert and oriented X 3, Well developed/nourished





- HEENT


HEENT: PERRL, Moist mucous membranes





- Neck


Neck: Supple, no meningeal sign





- Cardiac


Cardiac: RRR, Strong equal pulses





- Respiratory


Respiratory: No respiratory distress, Clear bilaterally





- Abdomen


Abdomen: Soft, Non tender, Non distended





- Derm


Derm: Warm and dry





- Extremities


Extremities: No edema





- Neuro


Neuro: Alert and oriented X 3





- Psych


Psych: Normal mood, Normal affect





Results





- Vitals


Vitals: 


 Vital Signs - 24 hr











  05/29/17





  19:03


 


Temperature 36.4 C L


 


Heart Rate 97


 


Respiratory 16





Rate 


 


Blood Pressure 104/70


 


O2 Saturation 98








 Oxygen











O2 Source                      Room air

















- Labs


Labs: 


 Laboratory Tests











  05/29/17 05/29/17 05/29/17





  20:35 20:35 20:35


 


WBC  26.5 H  


 


RBC  6.41 H  


 


Hgb  18.2 H  


 


Hct  52.6 H  


 


MCV  82.1  


 


MCH  28.4  


 


MCHC  34.6  


 


RDW  13.8  


 


Plt Count  406  


 


MPV  7.3 L  


 


Neut #  20.2 H  


 


Lymph #  3.5  


 


Mono #  2.1 H  


 


Eos #  0.6  


 


Baso #  0.1  


 


Absolute Nucleated RBC  0.06  


 


Nucleated RBCs  0.2  


 


Manual Slide Review  Indicated  


 


WBC Morphology  1+ REACTIVE LYMPHS  


 


Platelet Estimate  NORMAL (130-450,000)  


 


Platelet Morphology  NORMAL APPEARANCE  


 


RBC Morph Micro Appear  NORMAL APPEARANCE  


 


Sodium   131 L 


 


Potassium   < 1.5 L* 


 


Chloride   96 L 


 


Carbon Dioxide   21 


 


Anion Gap   14.0 H 


 


BUN   16 


 


Creatinine   1.3 H 


 


Estimated GFR (MDRD)   65 L 


 


Glucose   129 H 


 


Calcium   9.5 


 


Phosphorus   1.6 L  1.8 L


 


Magnesium   2.4  2.5


 


Total Bilirubin   0.8 


 


AST   27 


 


ALT   48 


 


Alkaline Phosphatase   101 


 


Total Creatine Kinase    145


 


CK-MB (CK-2)   


 


Total Protein   8.9 H 


 


Albumin   4.5 


 


Globulin   4.4 H 


 


Albumin/Globulin Ratio   1.0 


 


Lipase   53 H 














  05/29/17





  20:35


 


WBC 


 


RBC 


 


Hgb 


 


Hct 


 


MCV 


 


MCH 


 


MCHC 


 


RDW 


 


Plt Count 


 


MPV 


 


Neut # 


 


Lymph # 


 


Mono # 


 


Eos # 


 


Baso # 


 


Absolute Nucleated RBC 


 


Nucleated RBCs 


 


Manual Slide Review 


 


WBC Morphology 


 


Platelet Estimate 


 


Platelet Morphology 


 


RBC Morph Micro Appear 


 


Sodium 


 


Potassium 


 


Chloride 


 


Carbon Dioxide 


 


Anion Gap 


 


BUN 


 


Creatinine 


 


Estimated GFR (MDRD) 


 


Glucose 


 


Calcium 


 


Phosphorus 


 


Magnesium 


 


Total Bilirubin 


 


AST 


 


ALT 


 


Alkaline Phosphatase 


 


Total Creatine Kinase 


 


CK-MB (CK-2)  10.0 H


 


Total Protein 


 


Albumin 


 


Globulin 


 


Albumin/Globulin Ratio 


 


Lipase 














PD MEDICAL DECISION MAKING





- ED course


Complexity details: reviewed old records, reviewed results, re-evaluated patient

, considered differential, d/w patient, d/w consultant


ED course: 





Patient is a 27-year-old male with a history of renal tubular acidosis.  Found 

to be severely hypokalemic as he has not been taking his potassium supplements.

  Will admit the patient for monitoring and replacement of his potassium.  

Discussed the case with Dr. Jones, hospitalist who accepts.





This document was made in part using voice recognition software. While efforts 

are made to proofread this document, sound alike and grammatical errors may 

occur.





Departure





- Departure


Disposition: ED Place in Observation


Clinical Impression: 


 Hypokalemia


Condition: Stable


Discharge Date/Time: 05/29/17 21:44

## 2017-05-30 LAB
ALBUMIN/GLOB SERPL: 1 {RATIO} (ref 1–2.2)
ANION GAP SERPL CALCULATED.4IONS-SCNC: 11 MMOL/L (ref 6–13)
BASOPHILS NFR BLD AUTO: 0.1 10^3/UL (ref 0–0.1)
BASOPHILS NFR BLD AUTO: 0.5 %
BILIRUB BLD-MCNC: 0.9 MG/DL (ref 0.2–1)
BUN SERPL-MCNC: 15 MG/DL (ref 6–20)
CALCIUM UR-MCNC: 8.9 MG/DL (ref 8.5–10.3)
CHLORIDE SERPL-SCNC: 101 MMOL/L (ref 101–111)
CO2 SERPL-SCNC: 23 MMOL/L (ref 21–32)
CREAT SERPLBLD-SCNC: 1.2 MG/DL (ref 0.6–1.2)
CUL URINE ADD CHARGE: (no result)
EOSINOPHIL # BLD AUTO: 0.4 10^3/UL (ref 0–0.7)
EOSINOPHIL NFR BLD AUTO: 2.3 %
ERYTHROCYTE [DISTWIDTH] IN BLOOD BY AUTOMATED COUNT: 13.8 % (ref 12–15)
GFRSERPLBLD MDRD-ARVRAT: 72 ML/MIN/{1.73_M2} (ref 89–?)
GLOBULIN SER-MCNC: 4 G/DL (ref 2.1–4.2)
GLUCOSE SERPL-MCNC: 110 MG/DL (ref 70–100)
HCT VFR BLD AUTO: 48.9 % (ref 42–52)
HGB UR QL STRIP: 17.2 G/DL (ref 14–18)
LYMPHOCYTES # SPEC AUTO: 3.2 10^3/UL (ref 1.5–3.5)
LYMPHOCYTES NFR BLD AUTO: 17.1 %
MAGNESIUM SERPL-MCNC: 2.2 MG/DL (ref 1.7–2.8)
MAGNESIUM SERPL-MCNC: 2.4 MG/DL (ref 1.7–2.8)
MCH RBC QN AUTO: 28.8 PG (ref 27–31)
MCHC RBC AUTO-ENTMCNC: 35.3 G/DL (ref 32–36)
MCV RBC AUTO: 81.6 FL (ref 80–94)
MONOCYTES # BLD AUTO: 1.2 10^3/UL (ref 0–1)
MONOCYTES NFR BLD AUTO: 6.5 %
NEUTROPHILS # BLD AUTO: 13.9 10^3/UL (ref 1.5–6.6)
NEUTROPHILS # SNV AUTO: 18.9 X10^3/UL (ref 4.8–10.8)
NEUTROPHILS NFR BLD AUTO: 73.6 %
NRBC # BLD AUTO: 0 /100WBC
PDW BLD AUTO: 7.4 FL (ref 7.4–11.4)
PH UR STRIP.AUTO: 7.5 PH (ref 5–7.5)
PHOSPHATE BLD-MCNC: 1.7 MG/DL (ref 2.5–4.6)
PHOSPHATE BLD-MCNC: 2.5 MG/DL (ref 2.5–4.6)
PHOSPHATE BLD-MCNC: 2.6 MG/DL (ref 2.5–4.6)
PHOSPHATE BLD-MCNC: 3.2 MG/DL (ref 2.5–4.6)
POTASSIUM SERPL-SCNC: 1.5 MMOL/L (ref 3.5–5)
POTASSIUM SERPL-SCNC: 1.5 MMOL/L (ref 3.5–5)
POTASSIUM SERPL-SCNC: 2 MMOL/L (ref 3.5–5)
POTASSIUM SERPL-SCNC: 2.7 MMOL/L (ref 3.5–5)
PROT SPEC-MCNC: 8 G/DL (ref 6.7–8.2)
RBC MAR: 6 10^6/UL (ref 4.7–6.1)
SODIUM SERPLBLD-SCNC: 135 MMOL/L (ref 135–145)
SP GR UR STRIP.AUTO: 1.01 (ref 1–1.03)
UR CULTURE IF IND: (no result)
UROBILINOGEN UR STRIP.AUTO-MCNC: NEGATIVE MG/DL
WBC # BLD: 18.9 X10^3/UL
WBC # UR MANUAL: (no result) /HPF (ref 0–3)

## 2017-05-30 RX ADMIN — POTASSIUM CHLORIDE SCH MLS/HR: 7.46 INJECTION, SOLUTION INTRAVENOUS at 06:13

## 2017-05-30 RX ADMIN — POTASSIUM CHLORIDE SCH MLS/HR: 7.46 INJECTION, SOLUTION INTRAVENOUS at 11:33

## 2017-05-30 RX ADMIN — POTASSIUM CITRATE SCH MEQ: 5 TABLET ORAL at 06:02

## 2017-05-30 RX ADMIN — SODIUM CHLORIDE, PRESERVATIVE FREE SCH ML: 5 INJECTION INTRAVENOUS at 22:18

## 2017-05-30 RX ADMIN — POTASSIUM CHLORIDE SCH MLS/HR: 7.46 INJECTION, SOLUTION INTRAVENOUS at 17:59

## 2017-05-30 RX ADMIN — POTASSIUM CHLORIDE SCH MLS/HR: 7.46 INJECTION, SOLUTION INTRAVENOUS at 07:16

## 2017-05-30 RX ADMIN — CITALOPRAM HYDROBROMIDE SCH MG: 10 TABLET ORAL at 08:07

## 2017-05-30 RX ADMIN — POTASSIUM CHLORIDE SCH MLS/HR: 7.46 INJECTION, SOLUTION INTRAVENOUS at 10:31

## 2017-05-30 RX ADMIN — POTASSIUM CHLORIDE SCH MLS/HR: 7.46 INJECTION, SOLUTION INTRAVENOUS at 20:03

## 2017-05-30 RX ADMIN — POTASSIUM CHLORIDE SCH MLS/HR: 7.46 INJECTION, SOLUTION INTRAVENOUS at 13:44

## 2017-05-30 RX ADMIN — SODIUM CHLORIDE, PRESERVATIVE FREE SCH ML: 5 INJECTION INTRAVENOUS at 05:05

## 2017-05-30 RX ADMIN — POTASSIUM CHLORIDE SCH MEQ: 1500 TABLET, EXTENDED RELEASE ORAL at 04:01

## 2017-05-30 RX ADMIN — POTASSIUM CHLORIDE SCH MEQ: 1500 TABLET, EXTENDED RELEASE ORAL at 10:14

## 2017-05-30 RX ADMIN — POTASSIUM CHLORIDE SCH MEQ: 1500 TABLET, EXTENDED RELEASE ORAL at 12:37

## 2017-05-30 RX ADMIN — POTASSIUM CHLORIDE SCH MLS/HR: 7.46 INJECTION, SOLUTION INTRAVENOUS at 15:51

## 2017-05-30 RX ADMIN — POTASSIUM CHLORIDE SCH MLS/HR: 7.46 INJECTION, SOLUTION INTRAVENOUS at 03:59

## 2017-05-30 RX ADMIN — SPIRONOLACTONE SCH MG: 25 TABLET, FILM COATED ORAL at 21:19

## 2017-05-30 RX ADMIN — POTASSIUM CHLORIDE SCH MLS/HR: 7.46 INJECTION, SOLUTION INTRAVENOUS at 21:13

## 2017-05-30 RX ADMIN — POTASSIUM CHLORIDE SCH MLS/HR: 7.46 INJECTION, SOLUTION INTRAVENOUS at 01:46

## 2017-05-30 RX ADMIN — POTASSIUM CHLORIDE SCH MLS/HR: 7.46 INJECTION, SOLUTION INTRAVENOUS at 14:46

## 2017-05-30 RX ADMIN — SODIUM CHLORIDE, PRESERVATIVE FREE SCH ML: 5 INJECTION INTRAVENOUS at 13:17

## 2017-05-30 RX ADMIN — POTASSIUM CITRATE AND CITRIC ACID SCH EACH: 3.3; 1.002 GRANULE, FOR SOLUTION ORAL at 14:03

## 2017-05-30 RX ADMIN — POTASSIUM CHLORIDE SCH MLS/HR: 7.46 INJECTION, SOLUTION INTRAVENOUS at 09:25

## 2017-05-30 RX ADMIN — POTASSIUM CHLORIDE SCH MLS/HR: 7.46 INJECTION, SOLUTION INTRAVENOUS at 23:23

## 2017-05-30 RX ADMIN — SODIUM PHOSPHATE, DIBASIC, ANHYDROUS, POTASSIUM PHOSPHATE, MONOBASIC, AND SODIUM PHOSPHATE, MONOBASIC, MONOHYDRATE SCH MG: 852; 155; 130 TABLET, COATED ORAL at 16:39

## 2017-05-30 RX ADMIN — POTASSIUM CHLORIDE SCH MEQ: 1500 TABLET, EXTENDED RELEASE ORAL at 00:43

## 2017-05-30 RX ADMIN — POTASSIUM CHLORIDE SCH MLS/HR: 7.46 INJECTION, SOLUTION INTRAVENOUS at 16:51

## 2017-05-30 RX ADMIN — POTASSIUM CHLORIDE SCH MLS/HR: 7.46 INJECTION, SOLUTION INTRAVENOUS at 08:22

## 2017-05-30 RX ADMIN — PROPRANOLOL HYDROCHLORIDE SCH MG: 10 TABLET ORAL at 08:08

## 2017-05-30 RX ADMIN — POTASSIUM CHLORIDE SCH MLS/HR: 7.46 INJECTION, SOLUTION INTRAVENOUS at 05:05

## 2017-05-30 RX ADMIN — CITALOPRAM HYDROBROMIDE SCH MG: 10 TABLET ORAL at 21:18

## 2017-05-30 RX ADMIN — POTASSIUM CHLORIDE SCH MLS/HR: 7.46 INJECTION, SOLUTION INTRAVENOUS at 00:43

## 2017-05-30 RX ADMIN — SPIRONOLACTONE SCH MG: 25 TABLET, FILM COATED ORAL at 08:08

## 2017-05-30 RX ADMIN — POTASSIUM CHLORIDE SCH MEQ: 1500 TABLET, EXTENDED RELEASE ORAL at 06:53

## 2017-05-30 RX ADMIN — POTASSIUM CHLORIDE SCH MLS/HR: 7.46 INJECTION, SOLUTION INTRAVENOUS at 12:37

## 2017-05-30 RX ADMIN — PROPRANOLOL HYDROCHLORIDE SCH MG: 10 TABLET ORAL at 21:19

## 2017-05-30 RX ADMIN — HYDROCODONE BITARTRATE AND ACETAMINOPHEN PRN TAB: 5; 325 TABLET ORAL at 07:23

## 2017-05-30 RX ADMIN — SODIUM PHOSPHATE, DIBASIC, ANHYDROUS, POTASSIUM PHOSPHATE, MONOBASIC, AND SODIUM PHOSPHATE, MONOBASIC, MONOHYDRATE SCH: 852; 155; 130 TABLET, COATED ORAL at 18:00

## 2017-05-30 RX ADMIN — POTASSIUM CHLORIDE SCH MLS/HR: 7.46 INJECTION, SOLUTION INTRAVENOUS at 22:18

## 2017-05-30 RX ADMIN — HYDROCODONE BITARTRATE AND ACETAMINOPHEN PRN TAB: 5; 325 TABLET ORAL at 12:55

## 2017-05-30 RX ADMIN — POTASSIUM CHLORIDE SCH MLS/HR: 7.46 INJECTION, SOLUTION INTRAVENOUS at 19:00

## 2017-05-30 RX ADMIN — POTASSIUM CITRATE AND CITRIC ACID SCH EACH: 3.3; 1.002 GRANULE, FOR SOLUTION ORAL at 19:43

## 2017-05-30 RX ADMIN — POTASSIUM CHLORIDE SCH MLS/HR: 7.46 INJECTION, SOLUTION INTRAVENOUS at 02:53

## 2017-05-30 NOTE — PROVIDER PROGRESS NOTE
Subjective





- Prog Note Date


Prog Note Date: 05/30/17


Prog Note Time: 08:24





- Subjective


Pt reports feeling: No change


Subjective: 





getting K orally and by IV


no tetany yet





Current Medications





- Current Medications


Current Medications: 








Active Medications





Acetaminophen (Tylenol)  650 mg PO Q4HR PRN


   PRN Reason: Pain 1 to 4


Acetaminophen/Hydrocodone Bitart (Norco 5/325)  1 tab PO Q6HR PRN


   PRN Reason: PAIN


   Last Admin: 05/30/17 07:23 Dose:  1 tab


Buspirone HCl (Buspar)  5 mg PO BID formerly [facility]


   Last Admin: 05/30/17 08:07 Dose:  5 mg


Citalopram Hydrobromide (Celexa)  20 mg PO BID formerly [facility]


   Last Admin: 05/30/17 08:07 Dose:  20 mg


Enoxaparin Sodium (Lovenox)  40 mg SUBQ DAILY formerly [facility]


   Last Admin: 05/30/17 08:08 Dose:  40 mg


Potassium Chloride (Potassium Chloride)  100 mls @ 100 mls/hr IV Q1H formerly [facility]


   Last Admin: 05/30/17 08:22 Dose:  100 mls/hr


Lorazepam (Ativan)  0.5 mg PO TID PRN


   PRN Reason: Anxiety


Ondansetron HCl (Zofran Odt)  4 mg TL Q6HR PRN


   PRN Reason: Nausea / Vomiting


Potassium Chloride (K-Dur)  20 meq PO Q3H formerly [facility]


   Last Admin: 05/30/17 06:53 Dose:  20 meq


Potassium Citrate (Urocit-K)  5 meq PO TID formerly [facility]


   Last Admin: 05/30/17 06:02 Dose:  5 meq


Propranolol HCl (Inderal)  10 mg PO BID formerly [facility]


   Last Admin: 05/30/17 08:08 Dose:  10 mg


Risperidone (Risperdal)  2 mg PO QPM formerly [facility]


Sodium Chloride (Normal Saline Flush 0.9%)  10 ml IVP PRN PRN


   PRN Reason: AS NEEDED PER PROVIDER ORDERS


Sodium Chloride (Normal Saline Flush 0.9%)  10 ml IVP Q8HR formerly [facility]


   Last Admin: 05/30/17 05:05 Dose:  10 ml


Spironolactone (Aldactone)  25 mg PO BID formerly [facility]


   Last Admin: 05/30/17 08:08 Dose:  25 mg


Trazodone HCl (Desyrel)  50 mg PO QPM PRN


   PRN Reason: Insomnia


   Last Admin: 05/29/17 22:54 Dose:  50 mg





 





Trazodone HCl 50 mg PO QPM PRN 06/13/16 


Buspirone HCl 5 mg PO BID 05/03/17 


Citalopram Hydrobromide [Citalopram HBr] 20 mg PO BID 05/03/17 


HYDROcod/ACETAM 5/325 [Norco 5/325] 1 tab PO Q6HR PRN 05/03/17 


Lorazepam 0.5 mg PO TID PRN 05/03/17 


Propranolol HCl 10 mg PO BID 05/03/17 


Potassium Chloride 120 meq PO QID 05/11/17 











Objective





- Vital Signs/Intake & Output


Reviewed Vital Signs: Yes


Vital Signs: 





 Vital Signs











  Temp Pulse Resp BP Pulse Ox


 


 05/30/17 07:27  36.7 C    


 


 05/30/17 07:00   71  15  90/53 L  99


 


 05/30/17 06:00   66  14  115/62 


 


 05/30/17 05:00   71  20  90/60 


 


 05/30/17 04:00  36.7 C  70  13  102/61  99











Intake & Output: 





 Intake & Output











 05/27/17 05/28/17 05/29/17 05/30/17





 23:59 23:59 23:59 23:59


 


Intake Total    1109


 


Output Total   800 1400


 


Balance   -800 -291














- Objective


General Appearance: positive: No acute distress, Alert, Other (stocky mod 

overweight  male who looks stated age)


Eyes Bilateral: positive: PERRL


ENT: positive: Pharynx nml, Other (poor dentition)


Neck: positive: No JVD


Respiratory: positive: Chest non-tender.  negative: Wheezes, Rales, Rhonchi


Cardiovascular: positive: Regular rate & rhythm


Abdomen: positive: Non-tender, No organomegaly, Nml bowel sounds, No distention


Skin: positive: No rash, Warm, Dry


Extremities: positive: Full ROM, No pedal edema


Neurologic/Psychiatric: positive: Oriented x3, CN's nml (2-12), Motor nml





- Lab Results


Fish Bones: 


 05/30/17 04:41





 05/30/17 12:25


Other Labs: 





 Lab Results x24hrs











  05/30/17 05/30/17 Range/Units





  04:41 04:41 


 


WBC   18.9 H  (4.8-10.8)  x10^3/uL


 


RBC   6.00  (4.70-6.10)  10^6/uL


 


Hgb   17.2  (14.0-18.0)  g/dL


 


Hct   48.9  (42.0-52.0)  %


 


MCV   81.6  (80.0-94.0)  fL


 


MCH   28.8  (27.0-31.0)  pg


 


MCHC   35.3  (32.0-36.0)  g/dL


 


RDW   13.8  (12.0-15.0)  %


 


Plt Count   384  (130-450)  10^3/uL


 


MPV   7.4  (7.4-11.4)  fL


 


Neut #   13.9 H  (1.5-6.6)  10^3/uL


 


Lymph #   3.2  (1.5-3.5)  10^3/uL


 


Mono #   1.2 H  (0.0-1.0)  10^3/uL


 


Eos #   0.4  (0.0-0.7)  10^3/uL


 


Baso #   0.1  (0.0-0.1)  10^3/uL


 


Absolute Nucleated RBC   0.00  x10^3/uL


 


Nucleated RBCs   0.0  /100WBC


 


Sodium  135   (135-145)  mmol/L


 


Potassium  1.5 L*   (3.5-5.0)  mmol/L


 


Chloride  101   (101-111)  mmol/L


 


Carbon Dioxide  23   (21-32)  mmol/L


 


Anion Gap  11.0   (6-13)  


 


BUN  15   (6-20)  mg/dL


 


Creatinine  1.2   (0.6-1.2)  mg/dL


 


Estimated GFR (MDRD)  72 L   (>89)  


 


Glucose  110 H   ()  mg/dL


 


Calcium  8.9   (8.5-10.3)  mg/dL


 


Phosphorus  3.2   (2.5-4.6)  mg/dL


 


Magnesium  2.2   (1.7-2.8)  mg/dL


 


Total Bilirubin  0.9   (0.2-1.0)  mg/dL


 


AST  22   (10-42)  IU/L


 


ALT  42   (10-60)  IU/L


 


Alkaline Phosphatase  86   ()  IU/L


 


Total Protein  8.0   (6.7-8.2)  g/dL


 


Albumin  4.0   (3.2-5.5)  g/dL


 


Globulin  4.0   (2.1-4.2)  g/dL


 


Albumin/Globulin Ratio  1.0   (1.0-2.2)  














Assessment/Plan





- Problem List


(1) Hypokalemia


Impression: 


from noncompliance with meds for RTA. comes in when too weak. K is rising with 

supplement. continue meds. Pharmacy ([facility]) would like potassium chloride and 

potassium citrate stopped and change to his potass citric acid oral that he 

takes from home so will do so. 








(2) Depression with anxiety


Impression: 


on the meds ordered in the past. in the outpt setting rarely takes. 








(3) Discharge planning issues


Impression: 


he was homeless. lived out of a van with mom and dad. now has medi/medi. not 

sure what situation is. will discuss with case managment.

## 2017-05-31 VITALS — SYSTOLIC BLOOD PRESSURE: 110 MMHG | DIASTOLIC BLOOD PRESSURE: 73 MMHG

## 2017-05-31 LAB
CA-I SERPL ISE-MCNC: 1.03 MMOL/L (ref 1.15–1.33)
CALCIUM UR-MCNC: 8.1 MG/DL (ref 8.5–10.3)
MAGNESIUM SERPL-MCNC: 2 MG/DL (ref 1.7–2.8)
PH BLDV: 7.46 [PH] (ref 7.31–7.41)
PHOSPHATE BLD-MCNC: 2.3 MG/DL (ref 2.5–4.6)
PHOSPHATE BLD-MCNC: 2.9 MG/DL (ref 2.5–4.6)
POTASSIUM SERPL-SCNC: 2.3 MMOL/L (ref 3.5–5)
POTASSIUM SERPL-SCNC: 3 MMOL/L (ref 3.5–5)

## 2017-05-31 RX ADMIN — SODIUM PHOSPHATE, DIBASIC, ANHYDROUS, POTASSIUM PHOSPHATE, MONOBASIC, AND SODIUM PHOSPHATE, MONOBASIC, MONOHYDRATE SCH MG: 852; 155; 130 TABLET, COATED ORAL at 01:55

## 2017-05-31 RX ADMIN — POTASSIUM CHLORIDE SCH MLS/HR: 7.46 INJECTION, SOLUTION INTRAVENOUS at 03:43

## 2017-05-31 RX ADMIN — POTASSIUM CITRATE AND CITRIC ACID SCH EACH: 3.3; 1.002 GRANULE, FOR SOLUTION ORAL at 07:53

## 2017-05-31 RX ADMIN — POTASSIUM CITRATE AND CITRIC ACID SCH EACH: 3.3; 1.002 GRANULE, FOR SOLUTION ORAL at 01:55

## 2017-05-31 RX ADMIN — POTASSIUM CHLORIDE SCH MLS/HR: 7.46 INJECTION, SOLUTION INTRAVENOUS at 04:54

## 2017-05-31 RX ADMIN — POTASSIUM CHLORIDE SCH MLS/HR: 7.46 INJECTION, SOLUTION INTRAVENOUS at 02:36

## 2017-05-31 RX ADMIN — POTASSIUM CHLORIDE SCH MLS/HR: 7.46 INJECTION, SOLUTION INTRAVENOUS at 06:50

## 2017-05-31 RX ADMIN — POTASSIUM CHLORIDE SCH: 7.46 INJECTION, SOLUTION INTRAVENOUS at 07:56

## 2017-05-31 RX ADMIN — POTASSIUM CHLORIDE SCH MLS/HR: 7.46 INJECTION, SOLUTION INTRAVENOUS at 00:27

## 2017-05-31 RX ADMIN — HYDROCODONE BITARTRATE AND ACETAMINOPHEN PRN TAB: 5; 325 TABLET ORAL at 08:18

## 2017-05-31 RX ADMIN — SODIUM PHOSPHATE, DIBASIC, ANHYDROUS, POTASSIUM PHOSPHATE, MONOBASIC, AND SODIUM PHOSPHATE, MONOBASIC, MONOHYDRATE SCH MG: 852; 155; 130 TABLET, COATED ORAL at 03:44

## 2017-05-31 RX ADMIN — SODIUM CHLORIDE, PRESERVATIVE FREE SCH ML: 5 INJECTION INTRAVENOUS at 06:27

## 2017-05-31 RX ADMIN — POTASSIUM CHLORIDE SCH MLS/HR: 7.46 INJECTION, SOLUTION INTRAVENOUS at 07:05

## 2017-05-31 RX ADMIN — POTASSIUM CHLORIDE SCH MLS/HR: 7.46 INJECTION, SOLUTION INTRAVENOUS at 07:57

## 2017-05-31 RX ADMIN — POTASSIUM CHLORIDE SCH MLS/HR: 7.46 INJECTION, SOLUTION INTRAVENOUS at 01:32

## 2017-05-31 NOTE — DISCHARGE SUMMARY
DATE OF ADMISSION: 05/29/2017

 

DATE OF DISCHARGE: 05/31/2017

 

DISCHARGE DIAGNOSES:

1. Hypokalemia.

2. Renal tubular acidosis.

3. Depression with anxiety.

4. Discharge planning issues.

5. Personal history of noncompliance.

 

DISCHARGE MEDICATIONS:

1. Buspirone 5 mg p.o. b.i.d..

2. Citalopram 20 mg p.o. b.i.d..

3. Lorazepam 0.5 t.i.d. p.r.n.

4. Potassium citrate 4 packets p.o. q. 6 hours.

5. Propranolol 10 mg p.o. b.i.d..

6. Trazodone 50 mg q.p.m..

7. Risperdal 2 mg p.o. q.p.m..

8. Spironolactone 25 mg p.o. b.i.d.

 

PRIMARY CARE PROVIDER: Tori Kaur PA-C. 

 

HOSPITAL COURSE: The patient is well known to our institution. He is homeless and lives in a van with
 his mom and dad. He is noncompliant with medication regimen and will sometimes run out of medication
s or forget to take his medications. As such, he gets low potassium because of his renal tubular acid
osis and presents to the emergency room with weakness and fatigue. He was just admitted and discharge
d on 05/23/2017. He now returns with the same problems.

 

During his stay he was compliant, cooperative, and had few requests of the nurses. He received IV pot
assium riders, and his oral medication was adjusted for better absorption per pharmacy request for th
e potassium citrate. With that he was able to stay long enough to get his potassium to 3. At that poi
nt he said that 3 is enough and he does not like to get potassiums above 3, and asked to please be di
scharged respectfully. He was discharged in stable condition. Temperature was 36.9, pulse 82, blood p
ressure 110/73, respirations 14 and unlabored. He is 100% on room air. He is a stocky, Native [place], white male who looks his stated age with clear lungs, a regular rate and rhythm and no edema. Luan baez his stay, he did have some tachycardia and muscle cramps taken care of by Dilaudid p.r.n. He was n
ot having any cramps at the day of discharge. He was eating 100% of his meals.

 

He is asked to please follow up with his primary care provider, make sure he takes his medicines on a
 regular basis. He said that his prescriptions had been recently changed from [facility] to [facility] 
and he will be picking up his new potassium prescription there today.

 

 

 

DD:05/31/2017 14:40:00  DT: 05/31/2017 15:44  JOB #: [id number]  EXT JOB #:[id number]

## 2017-05-31 NOTE — DISCHARGE PLAN
Discharge Plan


Disposition: 01 Home, Self Care


Condition: Stable


Diet: Regular


Activity Restrictions: Activity as Tolerated


Shower Restrictions: No


Driving Restrictions: No


Additional Instructions or Follow Up instructions: 


You were admitted to the hospital because of  severely low potassium. Low 

potassium can cause sudden heart arrhythmias or sudden death. Please make every 

effort to take the prescriptions given to you by your regular doctor. Follow up 

with your doctor in the next week.


No Smoking: If you smoke, Please STOP!  Call [phone] for help.

## 2017-06-05 ENCOUNTER — HOSPITAL ENCOUNTER (INPATIENT)
Dept: HOSPITAL 76 - ED | Age: 27
LOS: 1 days | Discharge: HOME | DRG: 700 | End: 2017-06-06
Attending: INTERNAL MEDICINE | Admitting: SPECIALIST
Payer: MEDICAID

## 2017-06-05 ENCOUNTER — HOSPITAL ENCOUNTER (OUTPATIENT)
Dept: HOSPITAL 76 - EMS | Age: 27
Discharge: ANOTHER HOSPITAL | End: 2017-06-05
Attending: SURGERY
Payer: MEDICAID

## 2017-06-05 DIAGNOSIS — R53.1: Primary | ICD-10-CM

## 2017-06-05 DIAGNOSIS — F17.210: ICD-10-CM

## 2017-06-05 DIAGNOSIS — Z79.899: ICD-10-CM

## 2017-06-05 DIAGNOSIS — Z91.14: ICD-10-CM

## 2017-06-05 DIAGNOSIS — N25.89: Primary | ICD-10-CM

## 2017-06-05 DIAGNOSIS — F41.8: ICD-10-CM

## 2017-06-05 DIAGNOSIS — E26.81: ICD-10-CM

## 2017-06-05 DIAGNOSIS — E66.01: ICD-10-CM

## 2017-06-05 DIAGNOSIS — Z59.0: ICD-10-CM

## 2017-06-05 DIAGNOSIS — Z87.898: ICD-10-CM

## 2017-06-05 LAB
ALBUMIN/GLOB SERPL: 0.9 {RATIO} (ref 1–2.2)
ALBUMIN/GLOB SERPL: 1 {RATIO} (ref 1–2.2)
ANION GAP SERPL CALCULATED.4IONS-SCNC: 11 MMOL/L (ref 6–13)
ANION GAP SERPL CALCULATED.4IONS-SCNC: 13 MMOL/L (ref 6–13)
BASOPHILS NFR BLD AUTO: 0 10^3/UL (ref 0–0.1)
BASOPHILS NFR BLD AUTO: 0.1 %
BILIRUB BLD-MCNC: 0.6 MG/DL (ref 0.2–1)
BILIRUB BLD-MCNC: 0.7 MG/DL (ref 0.2–1)
BUN SERPL-MCNC: 18 MG/DL (ref 6–20)
BUN SERPL-MCNC: 20 MG/DL (ref 6–20)
CA-I SERPL ISE-MCNC: 1.06 MMOL/L (ref 1.15–1.33)
CALCIUM UR-MCNC: 8.3 MG/DL (ref 8.5–10.3)
CALCIUM UR-MCNC: 9.9 MG/DL (ref 8.5–10.3)
CHLORIDE SERPL-SCNC: 97 MMOL/L (ref 101–111)
CHLORIDE SERPL-SCNC: 98 MMOL/L (ref 101–111)
CO2 SERPL-SCNC: 21 MMOL/L (ref 21–32)
CO2 SERPL-SCNC: 24 MMOL/L (ref 21–32)
CREAT SERPLBLD-SCNC: 1.1 MG/DL (ref 0.6–1.2)
CREAT SERPLBLD-SCNC: 1.2 MG/DL (ref 0.6–1.2)
EOSINOPHIL # BLD AUTO: 0.3 10^3/UL (ref 0–0.7)
EOSINOPHIL NFR BLD AUTO: 1.2 %
ERYTHROCYTE [DISTWIDTH] IN BLOOD BY AUTOMATED COUNT: 13.9 % (ref 12–15)
GFRSERPLBLD MDRD-ARVRAT: 72 ML/MIN/{1.73_M2} (ref 89–?)
GFRSERPLBLD MDRD-ARVRAT: 79 ML/MIN/{1.73_M2} (ref 89–?)
GLOBULIN SER-MCNC: 3.3 G/DL (ref 2.1–4.2)
GLOBULIN SER-MCNC: 5 G/DL (ref 2.1–4.2)
GLUCOSE SERPL-MCNC: 156 MG/DL (ref 70–100)
GLUCOSE SERPL-MCNC: 165 MG/DL (ref 70–100)
HCT VFR BLD AUTO: 53.2 % (ref 42–52)
HGB UR QL STRIP: 18.5 G/DL (ref 14–18)
LIPASE SERPL-CCNC: 76 U/L (ref 22–51)
LYMPHOCYTES # SPEC AUTO: 1.7 10^3/UL (ref 1.5–3.5)
LYMPHOCYTES NFR BLD AUTO: 7.7 %
MAGNESIUM SERPL-MCNC: 2.3 MG/DL (ref 1.7–2.8)
MCH RBC QN AUTO: 28.6 PG (ref 27–31)
MCHC RBC AUTO-ENTMCNC: 34.8 G/DL (ref 32–36)
MCV RBC AUTO: 82 FL (ref 80–94)
MONOCYTES # BLD AUTO: 1.2 10^3/UL (ref 0–1)
MONOCYTES NFR BLD AUTO: 5.1 %
NEUTROPHILS # BLD AUTO: 19.4 10^3/UL (ref 1.5–6.6)
NEUTROPHILS # SNV AUTO: 22.6 X10^3/UL (ref 4.8–10.8)
NEUTROPHILS NFR BLD AUTO: 85.9 %
NRBC # BLD AUTO: 0.6 /100WBC
PDW BLD AUTO: 7.1 FL (ref 7.4–11.4)
PH BLDV: 7.44 [PH] (ref 7.31–7.41)
PHOSPHATE BLD-MCNC: 1.3 MG/DL (ref 2.5–4.6)
PLAT MORPH BLD: (no result)
PLATELET BLD QL SMEAR: (no result)
POTASSIUM SERPL-SCNC: 1.8 MMOL/L (ref 3.5–5)
POTASSIUM SERPL-SCNC: < 1.5 MMOL/L (ref 3.5–5)
PROT SPEC-MCNC: 6.7 G/DL (ref 6.7–8.2)
PROT SPEC-MCNC: 9.5 G/DL (ref 6.7–8.2)
RBC MAR: 6.49 10^6/UL (ref 4.7–6.1)
SODIUM SERPLBLD-SCNC: 132 MMOL/L (ref 135–145)
SODIUM SERPLBLD-SCNC: 132 MMOL/L (ref 135–145)
WBC # BLD: 22.6 X10^3/UL

## 2017-06-05 PROCEDURE — 96365 THER/PROPH/DIAG IV INF INIT: CPT

## 2017-06-05 PROCEDURE — 96375 TX/PRO/DX INJ NEW DRUG ADDON: CPT

## 2017-06-05 PROCEDURE — 80048 BASIC METABOLIC PNL TOTAL CA: CPT

## 2017-06-05 PROCEDURE — 87150 DNA/RNA AMPLIFIED PROBE: CPT

## 2017-06-05 PROCEDURE — 83690 ASSAY OF LIPASE: CPT

## 2017-06-05 PROCEDURE — 99284 EMERGENCY DEPT VISIT MOD MDM: CPT

## 2017-06-05 PROCEDURE — 36415 COLL VENOUS BLD VENIPUNCTURE: CPT

## 2017-06-05 PROCEDURE — 82330 ASSAY OF CALCIUM: CPT

## 2017-06-05 PROCEDURE — 85025 COMPLETE CBC W/AUTO DIFF WBC: CPT

## 2017-06-05 PROCEDURE — 99285 EMERGENCY DEPT VISIT HI MDM: CPT

## 2017-06-05 PROCEDURE — 83735 ASSAY OF MAGNESIUM: CPT

## 2017-06-05 PROCEDURE — 99406 BEHAV CHNG SMOKING 3-10 MIN: CPT

## 2017-06-05 PROCEDURE — 80053 COMPREHEN METABOLIC PANEL: CPT

## 2017-06-05 PROCEDURE — 84100 ASSAY OF PHOSPHORUS: CPT

## 2017-06-05 PROCEDURE — 96372 THER/PROPH/DIAG INJ SC/IM: CPT

## 2017-06-05 PROCEDURE — 80306 DRUG TEST PRSMV INSTRMNT: CPT

## 2017-06-05 RX ADMIN — POTASSIUM CHLORIDE SCH MLS/HR: 7.46 INJECTION, SOLUTION INTRAVENOUS at 15:38

## 2017-06-05 RX ADMIN — SPIRONOLACTONE SCH MG: 25 TABLET, FILM COATED ORAL at 21:22

## 2017-06-05 RX ADMIN — HYDROCODONE BITARTRATE AND ACETAMINOPHEN PRN TAB: 5; 325 TABLET ORAL at 21:55

## 2017-06-05 RX ADMIN — POTASSIUM CHLORIDE SCH MLS/HR: 7.46 INJECTION, SOLUTION INTRAVENOUS at 21:56

## 2017-06-05 RX ADMIN — POTASSIUM CHLORIDE SCH MLS/HR: 7.46 INJECTION, SOLUTION INTRAVENOUS at 18:57

## 2017-06-05 RX ADMIN — CITALOPRAM HYDROBROMIDE SCH MG: 10 TABLET ORAL at 21:23

## 2017-06-05 RX ADMIN — POTASSIUM CITRATE AND CITRIC ACID SCH EACH: 3.3; 1.002 GRANULE, FOR SOLUTION ORAL at 17:56

## 2017-06-05 RX ADMIN — HYDROCODONE BITARTRATE AND ACETAMINOPHEN PRN TAB: 5; 325 TABLET ORAL at 17:55

## 2017-06-05 RX ADMIN — POTASSIUM CHLORIDE SCH MLS/HR: 7.46 INJECTION, SOLUTION INTRAVENOUS at 20:54

## 2017-06-05 RX ADMIN — SODIUM CHLORIDE SCH MG: 9 INJECTION, SOLUTION INTRAVENOUS at 21:22

## 2017-06-05 RX ADMIN — POTASSIUM CHLORIDE SCH MLS/HR: 7.46 INJECTION, SOLUTION INTRAVENOUS at 19:55

## 2017-06-05 RX ADMIN — PROPRANOLOL HYDROCHLORIDE SCH MG: 10 TABLET ORAL at 21:22

## 2017-06-05 RX ADMIN — POTASSIUM CHLORIDE SCH MLS/HR: 7.46 INJECTION, SOLUTION INTRAVENOUS at 22:58

## 2017-06-05 RX ADMIN — POTASSIUM CHLORIDE SCH MLS/HR: 7.46 INJECTION, SOLUTION INTRAVENOUS at 16:53

## 2017-06-05 RX ADMIN — POTASSIUM CHLORIDE SCH MLS/HR: 7.46 INJECTION, SOLUTION INTRAVENOUS at 17:56

## 2017-06-05 SDOH — ECONOMIC STABILITY - HOUSING INSECURITY: HOMELESSNESS: Z59.0

## 2017-06-05 NOTE — HISTORY & PHYSICAL EXAMINATION
DATE OF ADMISSION: 06/05/2017

 

PRIMARY CARE PROVIDER: None.

 

CHIEF COMPLAINT: Weakness with a low potassium.

 

HISTORY OF PRESENT ILLNESS: He is a 26-year-old  male who is 
well known to our service with renal tubular acidosis and Bartleman type 
syndrome with resultant hypokalemia. He is admitted on average once or twice a 
month. If it has been a good time span, he can actually have 2 or 3 months 
without admission. His main problem is noncompliance with taking his 
medication. At this time, he says he just "forgot to take them" and only missed 
"a few doses" but started getting the same weakness, inability to walk, he 
associates it with low potassium. He came to the emergency room, potassium was 
less than 1.5. 

 

He denies chest pain, palpitations, shortness of breath. Just overwhelming 
sense of his muscles being "loose." He has been leaving AMA most of this year, 
but this last admission actually stayed to get his potassium above 3 and was 
discharged electively and appropriately. He is still homeless, lives with his 
parents in a van. He says he does have his potassium, it was not a matter of 
access. He just forgot to take the doses. 

 

PAST MEDICAL HISTORY

1. Renal tubular acidosis.

2. Depression with anxiety.

3. Homelessness.

4. Noncompliance with medications and followup appointments.

5. Tobacco abuse.

6. Marijuana use. 

 

SURGICAL HISTORY: None. 

 

MEDICATIONS 

1. Trazodone 50 mg p.o. q.p.m.

2. Propranolol 20 mg p.o. b.i.d.

3. BuSpar 10 mg p.o. b.i.d.

4. Spironolactone 25 mg p.o. b.i.d.

5. Risperdal 2 mg p.o. daily.  

6. Potassium citrate packet, 4 packets q.i.d. Each packet is 30 mEq.

7. Ativan 1 mg p.o. t.i.d. p.r.n. anxiety.

8. Citalopram 20 mg p.o. daily. 

 

SOCIAL HISTORY: Homeless. Lives with his parents in a van. Originally from 
[place], [place]. He is 
unemployed. Smoked 1/2 pack per day for 15 years, and quit smoking in April 2017
, but then he says that he is sneaking it again. Does take quite a bit of 
cannabis. He denies cocaine, heroin, LSD, methamphetamines.

 

FAMILY HISTORY: Mom has hypertension and depression. Dad has diabetes and 
mental illness. He has a cousin, who he refers to as his sister, but is a 
cousin who has Bartleman's disease. He has no children.

 

CODE STATUS: FULL CODE. 

 

REVIEW OF SYSTEMS: A comprehensive review of systems was performed. Pertinent 
positives and negatives are stated in the above history of present illness. 
Remainder of the other review of systems is negative.

 

PHYSICAL EXAMINATION

VITAL SIGNS: Temperature is 36.1, pulse is 88, blood pressure 112/65, 16 
respiration rate, 98% on room air.

GENERAL: He is a morbidly obese  young male in no acute 
distress. Affect is quiet, muted. Comfortable.

HEAD AND NECK: Show poor dentition, unshaven, but no facial asymmetry, no 
tongue fasciculations. Able to swallow. Neck is thick and unable to be assessed 
for JVD but is supple. No goiter. Shotty adenopathy.

LUNGS: Clear in a large chest wall cavity because of obesity. Diminished breath 
sounds at the bases, but no labored respiration. No crackles, rhonchi, or 
wheezing.

ABDOMEN: Obese, soft, nontender. Unable to be assessed for organomegaly. Normal 
bowel sounds. No rebound or guarding. Lifting of his pannus, there is some 
dried skin matter and some dirt, but no candida intertrigo.

EXTREMITIES: Show old lesions, lacerations that have healed and closed over and 
leaving him with scars and hypermelanin areas. Mild edema around the ankles, 
but no clubbing, cyanosis or edema, and good foot pulses.

NEUROLOGIC: He is able to lift his arms above his head, lift his legs off the 
bed for me. He says that his arms and legs just feel so wooden and so heavy, he 
can barely do it. Cranial nerves appear normal. 2-12 looked at. He can follow 2-
step commands. Hand grasp is present, but weak and limp. Plantar and 
dorsiflexion is present, but barely able to do against pressure. No 
fasciculations, no tremors visible in the muscles of his arms or legs. 

 

LABORATORIES: Sodium 132, potassium less than 1.5.  His potassium on discharge 
05/31/2017 was 3, chloride 98, GFR 72, random glucose 156. Last glycosylated 
hemoglobin was 5.1%, June 2016. Phos was 1.3, protein 9.5, lipase 76. White 
cell count is 22,000. He has been consistently elevated since last year. His 
white cell count ranges from 15,000 on upwards. Today it is 22.6. Hemoglobin is 
18.5. That is a little high for him, is usually 16, and it indicates probable 
hemoconcentration. Hematocrit 53, platelets 429. 

 

ASSESSMENT/PLAN 

1. Hypokalemia from renal tubular acidosis type disease. Secondary to personal 
history of noncompliance. Plan: ICU, monitor rhythm. Start electrolyte 
replacement protocol for magnesium, phosphorus, calcium. Start usual potassium 
citrate at 120 mEq p.o. q.i.d., and start potassium riders at 10 mEq an hour.

2. Anticipated muscle cramps in this gentleman. Valium IV p.r.n. Dilaudid 1 mg 
IV q1h prn. 

3. Depression with anxiety. Resume usual bupropion and SSRI and trazodone.

4. Homeless status. Will ask Case Management and Social Service to seriously 
look at a care plan for this unfortunate male. It would be helpful to reduce 
his admissions to the hospital for his sake. He is asking if there is a group 
home he can live in. 

5. FULL CODE status.

6. Deep venous thrombosis prophylaxis will be MONE hose. 

 

 

 

DD:06/05/2017 14:56:00  DT: 06/05/2017 19:02  JOB #: [id number]  EXT JOB #:[id number]

[email or web address]

## 2017-06-05 NOTE — ED PHYSICIAN DOCUMENTATION
History of Present Illness





- Stated complaint


Stated Complaint: LOW POTASSIUM





- Chief complaint


Chief Complaint: General





- History obtained from


History obtained from: Patient, EMS





- History of Present Illness


Timing: Today (He has a potassium wasting renal tubular acidosis and has missed 

several doses of his own medication and feels like he has low potassium.  He is 

generally weak with diffuse pain 8/10.  This is similar to prior episodes.)





Review of Systems


Ten Systems: 10 systems reviewed and negative


Constitutional: reports: Reviewed and negative


Nose: reports: Reviewed and negative


Throat: reports: Reviewed and negative





PD PAST MEDICAL HISTORY





- Past Medical History


Past Medical History: Yes


Cardiovascular: None


Respiratory: None


Neuro: None


Endocrine/Autoimmune: None


GI: None


: None


HEENT: None


Psych: Depression, Anxiety


Musculoskeletal: Fatigue


Derm: None, Other





- Past Surgical History


Past Surgical History: No





- Present Medications


Home Medications: 


 Ambulatory Orders











 Medication  Instructions  Recorded  Confirmed


 


Trazodone HCl 50 mg PO QPM PRN 06/13/16 06/05/17


 


Risperidone [Risperdal] 2 mg PO QPM 30 Days 02/26/17 06/05/17


 


Spironolactone 25 mg PO BID 30 Days 03/20/17 06/05/17


 


Buspirone HCl 5 mg PO BID 05/03/17 06/05/17


 


Citalopram Hydrobromide 20 mg PO BID 05/03/17 06/05/17





[Citalopram HBr]   


 


Lorazepam 0.5 mg PO TID PRN 05/03/17 06/05/17


 


Propranolol HCl 10 mg PO BID 05/03/17 06/05/17


 


Potass Cit/Citric Acid Oral [Pot 4 packet PO Q6H 05/30/17 06/05/17





Citrate-Citric Acid]   


 


HYDROcod/ACETAM 5/325 [Norco 5/325] 1 tab PO Q6HR PRN 06/05/17 06/05/17














- Allergies


Allergies/Adverse Reactions: 


 Allergies











Allergy/AdvReac Type Severity Reaction Status Date / Time


 


No Known Drug Allergies Allergy   Verified 05/29/17 19:06














- Social History


Does the pt smoke?: Yes


Smoking Status: Former smoker


Does the pt drink ETOH?: No


Does the pt have substance abuse?: Yes





- Family History


Family history: reports: Non contributory





- Immunizations


Immunizations are current?: Yes





- POLST


Patient has POLST: No


POLST Status: Full Code





PD ED PE NORMAL





- Vitals


Vital signs reviewed: Yes





- General


General: Alert and oriented X 3, No acute distress, Other (ggenerally weak, 

cannot sit)





- HEENT


HEENT: PERRL, EOMI





- Neck


Neck: Supple, no meningeal sign, No bony TTP





- Cardiac


Cardiac: RRR, No murmur





- Respiratory


Respiratory: No respiratory distress, Clear bilaterally





- Abdomen


Abdomen: Soft, Non tender





- Derm


Derm: No rash





- Extremities


Extremities: No edema, No calf tenderness / cord





- Neuro


Neuro: Alert and oriented X 3, No sensory deficit, Normal speech





- Psych


Psych: Normal mood, Normal affect





Results





- Vitals


Vitals: 


 Vital Signs - 24 hr











  06/05/17 06/05/17





  11:49 12:42


 


Temperature 36.1 C L 


 


Heart Rate 76 73


 


Respiratory 18 16





Rate  


 


Blood Pressure 145/94 H 119/67


 


O2 Saturation 96 98








 Oxygen











O2 Source                      Room air

















- Labs


Labs: 


 Laboratory Tests











  06/05/17 06/05/17





  12:05 12:05


 


WBC  22.6 H 


 


RBC  6.49 H 


 


Hgb  18.5 H 


 


Hct  53.2 H 


 


MCV  82.0 


 


MCH  28.6 


 


MCHC  34.8 


 


RDW  13.9 


 


Plt Count  429 


 


MPV  7.1 L 


 


Neut #  19.4 H 


 


Lymph #  1.7 


 


Mono #  1.2 H 


 


Eos #  0.3 


 


Baso #  0.0 


 


Absolute Nucleated RBC  0.13 


 


Nucleated RBCs  0.6 


 


Manual Slide Review  Indicated 


 


WBC Morphology   


 


Platelet Estimate  NORMAL (130-450,000) 


 


Platelet Morphology  NORMAL APPEARANCE 


 


RBC Morph Micro Appear  NORMAL APPEARANCE 


 


Sodium   132 L


 


Potassium   < 1.5 L*


 


Chloride   98 L


 


Carbon Dioxide   21


 


Anion Gap   13.0


 


BUN   20


 


Creatinine   1.2


 


Estimated GFR (MDRD)   72 L


 


Glucose   156 H


 


Calcium   9.9


 


Phosphorus   1.3 L


 


Magnesium   2.3


 


Total Bilirubin   0.6


 


AST   30


 


ALT   50


 


Alkaline Phosphatase   112


 


Total Protein   9.5 H


 


Albumin   4.5


 


Globulin   5.0 H


 


Albumin/Globulin Ratio   0.9 L


 


Lipase   76 H














PD MEDICAL DECISION MAKING





- ED course


ED course: 





27-year-old gentleman recurrence of hypokalemia, critically low values, 

repleted both IV and orally.  Call to the hospitalist for admission at 1340.





Departure





- Departure


Disposition: [facility] DC/Xfer


Clinical Impression: 


 Hypokalemia


Condition: Serious

## 2017-06-06 VITALS — DIASTOLIC BLOOD PRESSURE: 44 MMHG | SYSTOLIC BLOOD PRESSURE: 106 MMHG

## 2017-06-06 LAB
ANION GAP SERPL CALCULATED.4IONS-SCNC: 10 MMOL/L (ref 6–13)
ANION GAP SERPL CALCULATED.4IONS-SCNC: 11 MMOL/L (ref 6–13)
BASOPHILS NFR BLD AUTO: 0.4 %
BUN SERPL-MCNC: 15 MG/DL (ref 6–20)
BUN SERPL-MCNC: 18 MG/DL (ref 6–20)
CA-I SERPL ISE-MCNC: 1.06 MMOL/L (ref 1.15–1.33)
CALCIUM UR-MCNC: 8.2 MG/DL (ref 8.5–10.3)
CALCIUM UR-MCNC: 8.3 MG/DL (ref 8.5–10.3)
CHLORIDE SERPL-SCNC: 97 MMOL/L (ref 101–111)
CHLORIDE SERPL-SCNC: 98 MMOL/L (ref 101–111)
CO2 SERPL-SCNC: 28 MMOL/L (ref 21–32)
CO2 SERPL-SCNC: 30 MMOL/L (ref 21–32)
CREAT SERPLBLD-SCNC: 1.1 MG/DL (ref 0.6–1.2)
CREAT SERPLBLD-SCNC: 1.2 MG/DL (ref 0.6–1.2)
EOSINOPHIL NFR BLD AUTO: 3.3 %
EOSINOPHIL NFR BLD MANUAL: 4 %
ERYTHROCYTE [DISTWIDTH] IN BLOOD BY AUTOMATED COUNT: 13.9 % (ref 12–15)
GFRSERPLBLD MDRD-ARVRAT: 72 ML/MIN/{1.73_M2} (ref 89–?)
GFRSERPLBLD MDRD-ARVRAT: 79 ML/MIN/{1.73_M2} (ref 89–?)
GLUCOSE SERPL-MCNC: 111 MG/DL (ref 70–100)
GLUCOSE SERPL-MCNC: 136 MG/DL (ref 70–100)
HCT VFR BLD AUTO: 43.1 % (ref 42–52)
HGB UR QL STRIP: 14.7 G/DL (ref 14–18)
LYMPHOBLASTS # BLD: 24 %
LYMPHOCYTES NFR BLD AUTO: 26.8 %
MCH RBC QN AUTO: 28.1 PG (ref 27–31)
MCHC RBC AUTO-ENTMCNC: 34 G/DL (ref 32–36)
MCV RBC AUTO: 82.6 FL (ref 80–94)
MONOCYTES NFR BLD AUTO: 6.9 %
NEUTROPHILS # SNV AUTO: 11.1 X10^3/UL (ref 4.8–10.8)
NEUTROPHILS NFR BLD AUTO: 62.6 %
NEUTS BAND NFR BLD MANUAL: 53 %
NEUTS BAND NFR BLD: 0 %
NP AUTO DIFFERENTIAL?: YES
NP MAN DIFFERENTIAL?: NO
PDW BLD AUTO: 7 FL (ref 7.4–11.4)
PH BLDV: 7.43 [PH] (ref 7.31–7.41)
PLAT MORPH BLD: (no result)
PLATELET BLD QL SMEAR: (no result)
POTASSIUM SERPL-SCNC: 2.1 MMOL/L (ref 3.5–5)
POTASSIUM SERPL-SCNC: 3.1 MMOL/L (ref 3.5–5)
RBC MAR: 5.23 10^6/UL (ref 4.7–6.1)
SODIUM SERPLBLD-SCNC: 136 MMOL/L (ref 135–145)
SODIUM SERPLBLD-SCNC: 138 MMOL/L (ref 135–145)
TOTAL CELLS COUNTED BLD: 100
WBC # BLD: 11.1 X10^3/UL

## 2017-06-06 RX ADMIN — POTASSIUM CHLORIDE SCH MLS/HR: 7.46 INJECTION, SOLUTION INTRAVENOUS at 07:28

## 2017-06-06 RX ADMIN — CITALOPRAM HYDROBROMIDE SCH MG: 10 TABLET ORAL at 08:40

## 2017-06-06 RX ADMIN — POTASSIUM CHLORIDE SCH MLS/HR: 7.46 INJECTION, SOLUTION INTRAVENOUS at 00:07

## 2017-06-06 RX ADMIN — HYDROCODONE BITARTRATE AND ACETAMINOPHEN PRN TAB: 5; 325 TABLET ORAL at 06:40

## 2017-06-06 RX ADMIN — POTASSIUM CITRATE AND CITRIC ACID SCH EACH: 3.3; 1.002 GRANULE, FOR SOLUTION ORAL at 11:31

## 2017-06-06 RX ADMIN — POTASSIUM CHLORIDE SCH MLS/HR: 7.46 INJECTION, SOLUTION INTRAVENOUS at 10:29

## 2017-06-06 RX ADMIN — SODIUM CHLORIDE, PRESERVATIVE FREE SCH ML: 5 INJECTION INTRAVENOUS at 11:36

## 2017-06-06 RX ADMIN — SODIUM CHLORIDE, PRESERVATIVE FREE SCH ML: 5 INJECTION INTRAVENOUS at 06:30

## 2017-06-06 RX ADMIN — POTASSIUM CHLORIDE SCH MLS/HR: 7.46 INJECTION, SOLUTION INTRAVENOUS at 09:29

## 2017-06-06 RX ADMIN — POTASSIUM CHLORIDE SCH MLS/HR: 7.46 INJECTION, SOLUTION INTRAVENOUS at 01:03

## 2017-06-06 RX ADMIN — POTASSIUM CHLORIDE SCH MLS/HR: 7.46 INJECTION, SOLUTION INTRAVENOUS at 05:24

## 2017-06-06 RX ADMIN — POTASSIUM CITRATE AND CITRIC ACID SCH EACH: 3.3; 1.002 GRANULE, FOR SOLUTION ORAL at 06:29

## 2017-06-06 RX ADMIN — POTASSIUM CHLORIDE SCH MLS/HR: 7.46 INJECTION, SOLUTION INTRAVENOUS at 03:03

## 2017-06-06 RX ADMIN — SPIRONOLACTONE SCH MG: 25 TABLET, FILM COATED ORAL at 08:40

## 2017-06-06 RX ADMIN — SODIUM CHLORIDE SCH MG: 9 INJECTION, SOLUTION INTRAVENOUS at 08:40

## 2017-06-06 RX ADMIN — POTASSIUM CITRATE AND CITRIC ACID SCH EACH: 3.3; 1.002 GRANULE, FOR SOLUTION ORAL at 00:07

## 2017-06-06 RX ADMIN — POTASSIUM CHLORIDE SCH MLS/HR: 7.46 INJECTION, SOLUTION INTRAVENOUS at 06:29

## 2017-06-06 RX ADMIN — POTASSIUM CHLORIDE SCH MLS/HR: 7.46 INJECTION, SOLUTION INTRAVENOUS at 04:06

## 2017-06-06 RX ADMIN — POTASSIUM CHLORIDE SCH MLS/HR: 7.46 INJECTION, SOLUTION INTRAVENOUS at 08:29

## 2017-06-06 RX ADMIN — POTASSIUM CHLORIDE SCH MLS/HR: 7.46 INJECTION, SOLUTION INTRAVENOUS at 11:30

## 2017-06-06 RX ADMIN — POTASSIUM CHLORIDE SCH MLS/HR: 7.46 INJECTION, SOLUTION INTRAVENOUS at 02:04

## 2017-06-06 RX ADMIN — PROPRANOLOL HYDROCHLORIDE SCH MG: 10 TABLET ORAL at 08:40

## 2017-06-06 RX ADMIN — HYDROCODONE BITARTRATE AND ACETAMINOPHEN PRN TAB: 5; 325 TABLET ORAL at 10:32

## 2017-06-06 RX ADMIN — SODIUM CHLORIDE, PRESERVATIVE FREE SCH: 5 INJECTION INTRAVENOUS at 03:45

## 2017-06-06 NOTE — DISCHARGE PLAN
Discharge Plan


Disposition: 01 Home, Self Care


Condition: Good


Diet: Regular


Activity Restrictions: Activity as Tolerated


Shower Restrictions: No


Driving Restrictions: No


Weight Bearing: Full Weight


Additional Instructions or Follow Up instructions: 


Take your potassium medication as directed please. 


Followup with Maxine Kaur in about one week


Eat well and enjoy each day.





Thank you,





 Dr. Naranjo


No Smoking: If you smoke, Please STOP!  Call [phone] for help.


Follow-up with: 


Tori Kaur PA-C [Primary Care Provider] - 1 Week

## 2017-06-06 NOTE — DISCHARGE SUMMARY
DATE OF ADMISSION: 06/05/2017

 

DATE OF DISCHARGE: 06/06/2017

 

ADMISSION DIAGNOSES:

1. Hyperkalemia from renal tubular acidosis.

2. Anticipated muscle cramps.

3. Depression with anxiety.

4. Homeless status.

 

DISCHARGE DIAGNOSES:

1. Hypokalemia from renal tubular acidosis, improved.

2. Depression with anxiety.

3. Homeless status with the initiation of a group home placement, but without completion.

 

CONSULTATIONS: None.

 

SPECIAL PROCEDURES: None.

 

HOSPITAL COURSE AND MANAGEMENT: The initial presentation, hospital emergency department evaluation, a
nd hospitalist plan are well described in the history and physical, see copy of same. In summary, the
 patient is a 27-year-old  male who is well known to our service. He has RTA and a Bar
thelman type syndrome. He has frequently been admitted to this hospital because of hypokalemia due to
 noncompliance. The patient again says he forgot to take his pills and missed a few doses. The patien
t was found to have a potassium of less than 1.5. The patient was started on IV potassium and p.o. po
tassium, as well. The patient's level was found to be less than 1.5 to 1.8 to 2.1 and then 3.1, at wh
ich point the patient noted that he does not want to go any higher because he has muscle cramps.

 

The patient was subsequently discharged. He is eating and ambulatory. 

 

VITAL SIGNS: On the day of discharge examination he has a 37.2 temperature,  94, 108/71, 18, 99 on ro
om air saturation.

EYES: EOM within normal limits, PERRL, nonicteric.

MOUTH AND THROAT: Moist mucous membranes. No other pathology noted.

NECK: Supple. Nontender. No lymphadenopathy.

CHEST: Normal sinus rhythm. No murmurs.

CHEST WALL: Nontender. Symmetric.

ABDOMEN: Soft, nontender, normal bowel sounds. No hepatosplenomegaly.

NEUROLOGIC: Cognitive intact. Cranial nerves intact. Motor intact.

 

LABORATORY DATA: Sodium 136, potassium 3.1, chloride 97, 28 is the CO2, BUN is 15, creatinine is 1.2,
 glucose is 111, calcium is 8.2. Did have an albumin of 4.5 on admission. The patient's CBC shows a w
patricia count 11.1 down from 22.6 with a 14 and 43 hemoglobin and hematocrit, platelets of 322. 

 

The patient is discharged home and he is to resume his usual medications.

 

ALLERGIES: NO KNOWN DRUG ALLERGIES.

 

DISCHARGE MEDICATIONS:

1. Potassium citrate 40 mEq. 4 q.i.d..

2. Hydrocodone/Norco which he gets from his PCP and refill from this physician is declined.

3. The patient is on trazodone 50 mg q.p.m..

4. Spironolactone 25 mg b.i.d..

5. Risperdal 2 mg at bedtime.

6. Propranolol 10 mg b.i.d..

7. Lorazepam 0.5 mg t.i.d. p.r.n..

8. Citalopram 20 mg b.i.d.

9. Buspirone 5 mg b.i.d. 

 

The patient to followup with Tori Kaur, PCP. Time spent in discharge activity including collabo
ration with case management, nursing and the patient instructions was less than 30 minutes.

 

 

 

DD:06/06/2017 15:05:00  DT: 06/06/2017 16:10  JOB #: [id number]  EXT JOB #:[id number]

## 2017-06-11 ENCOUNTER — HOSPITAL ENCOUNTER (EMERGENCY)
Dept: HOSPITAL 76 - ED | Age: 27
Discharge: HOME | End: 2017-06-11
Payer: MEDICAID

## 2017-06-11 VITALS — DIASTOLIC BLOOD PRESSURE: 68 MMHG | SYSTOLIC BLOOD PRESSURE: 110 MMHG

## 2017-06-11 DIAGNOSIS — E87.6: Primary | ICD-10-CM

## 2017-06-11 DIAGNOSIS — E83.39: ICD-10-CM

## 2017-06-11 LAB
ALBUMIN/GLOB SERPL: 1 {RATIO} (ref 1–2.2)
ANION GAP SERPL CALCULATED.4IONS-SCNC: 13 MMOL/L (ref 6–13)
ANION GAP SERPL CALCULATED.4IONS-SCNC: 15 MMOL/L (ref 6–13)
BASOPHILS NFR BLD AUTO: 0.7 %
BILIRUB BLD-MCNC: 0.7 MG/DL (ref 0.2–1)
BUN SERPL-MCNC: 17 MG/DL (ref 6–20)
BUN SERPL-MCNC: 18 MG/DL (ref 6–20)
CALCIUM UR-MCNC: 8.3 MG/DL (ref 8.5–10.3)
CALCIUM UR-MCNC: 9.6 MG/DL (ref 8.5–10.3)
CHLORIDE SERPL-SCNC: 95 MMOL/L (ref 101–111)
CHLORIDE SERPL-SCNC: 96 MMOL/L (ref 101–111)
CO2 SERPL-SCNC: 22 MMOL/L (ref 21–32)
CO2 SERPL-SCNC: 27 MMOL/L (ref 21–32)
CREAT SERPLBLD-SCNC: 1.1 MG/DL (ref 0.6–1.2)
CREAT SERPLBLD-SCNC: 1.3 MG/DL (ref 0.6–1.2)
EOSINOPHIL NFR BLD AUTO: 1.8 %
EOSINOPHIL NFR BLD MANUAL: 2 %
ERYTHROCYTE [DISTWIDTH] IN BLOOD BY AUTOMATED COUNT: 14.1 % (ref 12–15)
GFRSERPLBLD MDRD-ARVRAT: 65 ML/MIN/{1.73_M2} (ref 89–?)
GFRSERPLBLD MDRD-ARVRAT: 79 ML/MIN/{1.73_M2} (ref 89–?)
GLOBULIN SER-MCNC: 4.4 G/DL (ref 2.1–4.2)
GLUCOSE SERPL-MCNC: 118 MG/DL (ref 70–100)
GLUCOSE SERPL-MCNC: 163 MG/DL (ref 70–100)
HCT VFR BLD AUTO: 51.6 % (ref 42–52)
HGB UR QL STRIP: 18 G/DL (ref 14–18)
LIPASE SERPL-CCNC: 71 U/L (ref 22–51)
LYMPHOBLASTS # BLD: 16 %
LYMPHOCYTES NFR BLD AUTO: 11.5 %
MAGNESIUM SERPL-MCNC: 2.1 MG/DL (ref 1.7–2.8)
MCH RBC QN AUTO: 28.5 PG (ref 27–31)
MCHC RBC AUTO-ENTMCNC: 34.9 G/DL (ref 32–36)
MCV RBC AUTO: 81.6 FL (ref 80–94)
MONOCYTES NFR BLD AUTO: 5.1 %
NEUTROPHILS # SNV AUTO: 20.4 X10^3/UL (ref 4.8–10.8)
NEUTROPHILS NFR BLD AUTO: 80.9 %
NEUTS BAND NFR BLD MANUAL: 73 %
NEUTS BAND NFR BLD: 2 %
NP AUTO DIFFERENTIAL?: YES
NP MAN DIFFERENTIAL?: NO
PDW BLD AUTO: 6.9 FL (ref 7.4–11.4)
PHOSPHATE BLD-MCNC: 1.7 MG/DL (ref 2.5–4.6)
PLATELET BLD QL SMEAR: (no result)
POTASSIUM SERPL-SCNC: 3.6 MMOL/L (ref 3.5–5)
POTASSIUM SERPL-SCNC: < 1.5 MMOL/L (ref 3.5–5)
PROT SPEC-MCNC: 8.9 G/DL (ref 6.7–8.2)
RBC MAR: 6.32 10^6/UL (ref 4.7–6.1)
SODIUM SERPLBLD-SCNC: 133 MMOL/L (ref 135–145)
SODIUM SERPLBLD-SCNC: 135 MMOL/L (ref 135–145)
WBC # BLD: 20.4 X10^3/UL

## 2017-06-11 PROCEDURE — 83690 ASSAY OF LIPASE: CPT

## 2017-06-11 PROCEDURE — 96376 TX/PRO/DX INJ SAME DRUG ADON: CPT

## 2017-06-11 PROCEDURE — 93010 ELECTROCARDIOGRAM REPORT: CPT

## 2017-06-11 PROCEDURE — 80053 COMPREHEN METABOLIC PANEL: CPT

## 2017-06-11 PROCEDURE — 96365 THER/PROPH/DIAG IV INF INIT: CPT

## 2017-06-11 PROCEDURE — 84100 ASSAY OF PHOSPHORUS: CPT

## 2017-06-11 PROCEDURE — 85025 COMPLETE CBC W/AUTO DIFF WBC: CPT

## 2017-06-11 PROCEDURE — 83735 ASSAY OF MAGNESIUM: CPT

## 2017-06-11 PROCEDURE — 99285 EMERGENCY DEPT VISIT HI MDM: CPT

## 2017-06-11 PROCEDURE — 96375 TX/PRO/DX INJ NEW DRUG ADDON: CPT

## 2017-06-11 PROCEDURE — 96366 THER/PROPH/DIAG IV INF ADDON: CPT

## 2017-06-11 PROCEDURE — 99284 EMERGENCY DEPT VISIT MOD MDM: CPT

## 2017-06-11 PROCEDURE — 80048 BASIC METABOLIC PNL TOTAL CA: CPT

## 2017-06-11 PROCEDURE — 93005 ELECTROCARDIOGRAM TRACING: CPT

## 2017-06-11 PROCEDURE — 36415 COLL VENOUS BLD VENIPUNCTURE: CPT

## 2017-06-11 NOTE — ED PHYSICIAN DOCUMENTATION
History of Present Illness





- Stated complaint


Stated Complaint: BODY SORENESS





- Chief complaint


Chief Complaint: General





- History obtained from


History obtained from: Patient





- Additonal information


Additional information: 





27-year-old gentleman with potassium wasting renal tubular acidosis presents 

with diffuse body pain and weakness consistent with prior multiple and repeated 

episodes of profound hypokalemia, he has been skipping his medications because 

they upset his stomach.





Review of Systems


Ten Systems: 10 systems reviewed and negative


Constitutional: denies: Fever, Chills


Nose: denies: Rhinorrhea / runny nose, Congestion


Respiratory: denies: Dyspnea, Cough


GI: denies: Nausea, Vomiting, Diarrhea





PD PAST MEDICAL HISTORY





- Past Medical History


Past Medical History: Yes


Cardiovascular: None


Respiratory: None


Neuro: None


Endocrine/Autoimmune: None


GI: None


: None


HEENT: None


Psych: Depression, Anxiety


Musculoskeletal: Fatigue


Derm: None, Other


Other Past Medical History: low potassium





- Past Surgical History


Past Surgical History: Yes





- Present Medications


Home Medications: 


 Ambulatory Orders











 Medication  Instructions  Recorded  Confirmed


 


Trazodone HCl 50 mg PO QPM PRN 06/13/16 06/05/17


 


Risperidone [Risperdal] 2 mg PO QPM 30 Days 02/26/17 06/05/17


 


Spironolactone 25 mg PO BID 30 Days 03/20/17 06/05/17


 


Buspirone HCl 5 mg PO BID 05/03/17 06/05/17


 


Citalopram Hydrobromide 20 mg PO BID 05/03/17 06/05/17





[Citalopram HBr]   


 


Lorazepam 0.5 mg PO TID PRN 05/03/17 06/05/17


 


Propranolol HCl 10 mg PO BID 05/03/17 06/05/17


 


Potass Cit/Citric Acid Oral [Pot 4 each PO Q6H 05/30/17 06/05/17





Citrate-Citric Acid]   


 


HYDROcod/ACETAM 5/325 [Norco 5/325] 1 tab PO Q6HR PRN 06/05/17 06/05/17














- Allergies


Allergies/Adverse Reactions: 


 Allergies











Allergy/AdvReac Type Severity Reaction Status Date / Time


 


No Known Drug Allergies Allergy   Verified 05/29/17 19:06














- Social History


Does the pt smoke?: Yes


Smoking Status: Former smoker


Does the pt drink ETOH?: No


Does the pt have substance abuse?: Yes





- Family History


Family history: reports: Non contributory





- Immunizations


Immunizations are current?: Yes





- POLST


Patient has POLST: No


POLST Status: Full Code





PD ED PE NORMAL





- Vitals


Vital signs reviewed: Yes





- General


General: Alert and oriented X 3, No acute distress





- HEENT


HEENT: PERRL, EOMI





- Neck


Neck: Supple, no meningeal sign, No bony TTP





- Cardiac


Cardiac: RRR, No murmur





- Respiratory


Respiratory: No respiratory distress, Clear bilaterally





- Abdomen


Abdomen: Normal bowel sounds, Soft, Non tender





- Back


Back: No CVA TTP, No spinal TTP





- Derm


Derm: Normal color, Warm and dry





- Extremities


Extremities: No edema





- Neuro


Neuro: Alert and oriented X 3, Normal speech





- Psych


Psych: Normal mood, Normal affect





Results





- Vitals


Vitals: 


 Vital Signs - 24 hr











  06/11/17 06/11/17 06/11/17





  14:05 14:49 15:30


 


Temperature 36.6 C  


 


Heart Rate 104 H 57 L 88


 


Respiratory 18 19 13





Rate   


 


Blood Pressure 119/83 H 119/51 L 104/70


 


O2 Saturation 98 98 98














  06/11/17 06/11/17 06/11/17





  16:12 17:45 19:11


 


Temperature   


 


Heart Rate 82 94 98


 


Respiratory 18 23 20





Rate   


 


Blood Pressure 117/81 H 105/69 110/63


 


O2 Saturation 96 100 








 Oxygen











O2 Source                      Room air

















- EKG (time done)


  ** 1410


Rate: Rate (enter#) (95)


Rhythm: NSR


Axis: Normal


Intervals: Prolonged QT (502)


Ischemia: Normal ST segments





- Labs


Labs: 


 Laboratory Tests











  06/11/17 06/11/17 06/11/17





  14:20 14:20 19:10


 


WBC  20.4 H  


 


RBC  6.32 H  


 


Hgb  18.0  


 


Hct  51.6  


 


MCV  81.6  


 


MCH  28.5  


 


MCHC  34.9  


 


RDW  14.1  


 


Plt Count  403  


 


MPV  6.9 L  


 


Neut #  Not Reportable  


 


Lymph #  Not Reportable  


 


Mono #  Not Reportable  


 


Eos #  Not Reportable  


 


Baso #  Not Reportable  


 


Absolute Nucleated RBC  Not Reportable  


 


Band Neuts % (Manual)  2  


 


Neutrophils # (Manual)  15.3 H  


 


Lymphocytes # (Manual)  3.3  


 


Monocytes # (Manual)  1.4 H  


 


Eosinophils # (Manual)  0.4  


 


Nucleated RBCs  Not Reportable  


 


Platelet Estimate  NORMAL (130-450,000)  


 


RBC Morph Micro Appear  NORMAL APPEARANCE  


 


Sodium   133 L  135


 


Potassium   < 1.5 L*  3.6


 


Chloride   96 L  95 L


 


Carbon Dioxide   22  27


 


Anion Gap   15.0 H  13.0


 


BUN   18  17


 


Creatinine   1.1  1.3 H


 


Estimated GFR (MDRD)   79 L  65 L


 


Glucose   118 H  163 H


 


Calcium   9.6  8.3 L


 


Phosphorus   1.7 L 


 


Magnesium   2.1 


 


Total Bilirubin   0.7 


 


AST   36 


 


ALT   59 


 


Alkaline Phosphatase   105 


 


Total Protein   8.9 H 


 


Albumin   4.5 


 


Globulin   4.4 H 


 


Albumin/Globulin Ratio   1.0 


 


Lipase   71 H 














Procedures





- General procedure


General procedure: 





He's notoriously difficult for IV access I personally placed a 20-gauge IV in 

the right forearm using real-time ultrasound guidance after ChloraPrep during 

initial evaluation which grew and flushed well.  Labs were sent.





PD MEDICAL DECISION MAKING





- ED course


ED course: 





Presents again with profound hypokalemia.  He was repleted orally and IV here.  

We discussed admission, there is no ICU bed available here and he refused 

transfer.





After several rounds of oral and IV medications his potassium was rechecked and 

now 3.6 and he requested discharge.





Departure





- Departure


Disposition: 01 Home, Self Care


Clinical Impression: 


 Hypokalemia, Hypophosphatemia


Condition: Good


Record reviewed to determine appropriate education?: Yes


Comments: 


TAKE YOUR MEDICATIONS AT HOME AS YOU ARE SUPPOSED TO!!!!!


Call your doctor to arrange a follow up appointment. Make the next available 

appointment. In the interim return anytime if worse or if new symptoms develop.

## 2017-06-17 ENCOUNTER — HOSPITAL ENCOUNTER (INPATIENT)
Dept: HOSPITAL 76 - ED | Age: 27
LOS: 1 days | Discharge: HOME | DRG: 700 | End: 2017-06-18
Attending: INTERNAL MEDICINE | Admitting: INTERNAL MEDICINE
Payer: MEDICAID

## 2017-06-17 DIAGNOSIS — T50.3X6A: ICD-10-CM

## 2017-06-17 DIAGNOSIS — N25.89: Primary | ICD-10-CM

## 2017-06-17 DIAGNOSIS — Z59.0: ICD-10-CM

## 2017-06-17 DIAGNOSIS — F41.8: ICD-10-CM

## 2017-06-17 DIAGNOSIS — Z72.0: ICD-10-CM

## 2017-06-17 DIAGNOSIS — Z91.138: ICD-10-CM

## 2017-06-17 DIAGNOSIS — T50.0X6A: ICD-10-CM

## 2017-06-17 LAB
ALBUMIN/GLOB SERPL: 1.2 {RATIO} (ref 1–2.2)
ANION GAP SERPL CALCULATED.4IONS-SCNC: 12 MMOL/L (ref 6–13)
ANION GAP SERPL CALCULATED.4IONS-SCNC: 15 MMOL/L (ref 6–13)
BILIRUB BLD-MCNC: 0.6 MG/DL (ref 0.2–1)
BUN SERPL-MCNC: 20 MG/DL (ref 6–20)
BUN SERPL-MCNC: 21 MG/DL (ref 6–20)
CALCIUM UR-MCNC: 9.4 MG/DL (ref 8.5–10.3)
CALCIUM UR-MCNC: 9.8 MG/DL (ref 8.5–10.3)
CHLORIDE SERPL-SCNC: 100 MMOL/L (ref 101–111)
CHLORIDE SERPL-SCNC: 96 MMOL/L (ref 101–111)
CO2 SERPL-SCNC: 17 MMOL/L (ref 21–32)
CO2 SERPL-SCNC: 23 MMOL/L (ref 21–32)
CREAT SERPLBLD-SCNC: 1 MG/DL (ref 0.6–1.2)
CREAT SERPLBLD-SCNC: 1.1 MG/DL (ref 0.6–1.2)
GFRSERPLBLD MDRD-ARVRAT: 79 ML/MIN/{1.73_M2} (ref 89–?)
GFRSERPLBLD MDRD-ARVRAT: 88 ML/MIN/{1.73_M2} (ref 89–?)
GLOBULIN SER-MCNC: 4.2 G/DL (ref 2.1–4.2)
GLUCOSE SERPL-MCNC: 106 MG/DL (ref 70–100)
GLUCOSE SERPL-MCNC: 170 MG/DL (ref 70–100)
LIPASE SERPL-CCNC: 63 U/L (ref 22–51)
MAGNESIUM SERPL-MCNC: 2.6 MG/DL (ref 1.7–2.8)
PHOSPHATE BLD-MCNC: 2.2 MG/DL (ref 2.5–4.6)
POTASSIUM SERPL-SCNC: 1.7 MMOL/L (ref 3.5–5)
POTASSIUM SERPL-SCNC: < 1.5 MMOL/L (ref 3.5–5)
PROT SPEC-MCNC: 9.2 G/DL (ref 6.7–8.2)
SODIUM SERPLBLD-SCNC: 131 MMOL/L (ref 135–145)
SODIUM SERPLBLD-SCNC: 132 MMOL/L (ref 135–145)

## 2017-06-17 PROCEDURE — 85025 COMPLETE CBC W/AUTO DIFF WBC: CPT

## 2017-06-17 PROCEDURE — 80053 COMPREHEN METABOLIC PANEL: CPT

## 2017-06-17 PROCEDURE — 96374 THER/PROPH/DIAG INJ IV PUSH: CPT

## 2017-06-17 PROCEDURE — 36415 COLL VENOUS BLD VENIPUNCTURE: CPT

## 2017-06-17 PROCEDURE — 96361 HYDRATE IV INFUSION ADD-ON: CPT

## 2017-06-17 PROCEDURE — 99285 EMERGENCY DEPT VISIT HI MDM: CPT

## 2017-06-17 PROCEDURE — 87150 DNA/RNA AMPLIFIED PROBE: CPT

## 2017-06-17 PROCEDURE — 99284 EMERGENCY DEPT VISIT MOD MDM: CPT

## 2017-06-17 PROCEDURE — 84100 ASSAY OF PHOSPHORUS: CPT

## 2017-06-17 PROCEDURE — 83735 ASSAY OF MAGNESIUM: CPT

## 2017-06-17 PROCEDURE — 80048 BASIC METABOLIC PNL TOTAL CA: CPT

## 2017-06-17 PROCEDURE — 99283 EMERGENCY DEPT VISIT LOW MDM: CPT

## 2017-06-17 PROCEDURE — 83690 ASSAY OF LIPASE: CPT

## 2017-06-17 RX ADMIN — SPIRONOLACTONE SCH MG: 25 TABLET, FILM COATED ORAL at 21:55

## 2017-06-17 RX ADMIN — CITALOPRAM HYDROBROMIDE SCH MG: 10 TABLET ORAL at 21:54

## 2017-06-17 RX ADMIN — SODIUM PHOSPHATE, DIBASIC, ANHYDROUS, POTASSIUM PHOSPHATE, MONOBASIC, AND SODIUM PHOSPHATE, MONOBASIC, MONOHYDRATE SCH MG: 852; 155; 130 TABLET, COATED ORAL at 19:35

## 2017-06-17 RX ADMIN — SODIUM PHOSPHATE, DIBASIC, ANHYDROUS, POTASSIUM PHOSPHATE, MONOBASIC, AND SODIUM PHOSPHATE, MONOBASIC, MONOHYDRATE SCH MG: 852; 155; 130 TABLET, COATED ORAL at 21:54

## 2017-06-17 RX ADMIN — SODIUM CHLORIDE, PRESERVATIVE FREE SCH: 5 INJECTION INTRAVENOUS at 21:55

## 2017-06-17 RX ADMIN — POTASSIUM CHLORIDE SCH MLS/HR: 7.46 INJECTION, SOLUTION INTRAVENOUS at 20:21

## 2017-06-17 RX ADMIN — HYDROCODONE BITARTRATE AND ACETAMINOPHEN PRN TAB: 5; 325 TABLET ORAL at 21:54

## 2017-06-17 RX ADMIN — PROPRANOLOL HYDROCHLORIDE SCH MG: 10 TABLET ORAL at 21:55

## 2017-06-17 RX ADMIN — POTASSIUM CHLORIDE SCH MLS/HR: 7.46 INJECTION, SOLUTION INTRAVENOUS at 19:20

## 2017-06-17 RX ADMIN — POTASSIUM CHLORIDE SCH MLS/HR: 7.46 INJECTION, SOLUTION INTRAVENOUS at 21:47

## 2017-06-17 RX ADMIN — POTASSIUM CHLORIDE SCH MLS/HR: 7.46 INJECTION, SOLUTION INTRAVENOUS at 23:06

## 2017-06-17 RX ADMIN — SODIUM CHLORIDE AND POTASSIUM CHLORIDE SCH MLS/HR: 9; 1.49 INJECTION, SOLUTION INTRAVENOUS at 19:18

## 2017-06-17 SDOH — ECONOMIC STABILITY - HOUSING INSECURITY: HOMELESSNESS: Z59.0

## 2017-06-17 NOTE — HISTORY & PHYSICAL EXAMINATION
Chief Complaint





- Chief Complaint


Chief Complaint: tthe pain





History of Present Illness





- Admitted From


Admitted From:: emergency department





- History Obtained From


Records Reviewed: yes


History obtained from: patient


Exam Limitations: none





- History of Present Illness


HPI Comment/Other: 





The patient is a 27-year-old gentleman with a past medical history significant 

for renal tubular acidosis type II with severe hypokalemia, depression, anxiety 

disorder, tobacco abuse, long history of noncompliance with medication, medical 

treatment and leaving AGAINST MEDICAL ADVICE on multiple previous occasions who 

presents to the emergency department with a chief complaint of upper and lower 

body soreness. Patient states that the symptoms started when he woke up this 

morning. He states initially he was having soreness in the upper extremities 

and this moved down to his entire body and into his legs. Patient states that 

when he normally has soreness like this is usually because his potassium is 

low. He states that he missed a few doses of his potassium. And the soreness 

got so bad that he decided to come into the emergency department. Patient 

otherwise denies any fevers, chills, headache, chest pain, abdominal pain, 

nausea, diarrhea, urinary symptoms or focal neurologic deficits.





On presentation to the emergency department the patient was afebrile he was 

slightly tachycardic otherwise his vital signs were stable. Patient's blood was 

sent for laboratory testing and his serum chemistry revealed a sodium of 132, 

potassium of less than 1.5, phosphorus of 2.2 and a slightly elevated ALT and 

alkaline phosphatase. The patient also had a mild acidosis with a positive 

anion gap. Patient was admitted to the ICU for severe hypokalemia and will 

receive IV and by mouth potassium and be monitored closely.





Review of Systems





- Constitutional


Constitutional: reports: Fatigue, Weakness.  denies: Fever, Chills, Malaise, 

Poor appetite, Diaphoresis, Night sweats, Weight gain, Weight loss





- Eyes


Eyes: denies: Pain, Irritation, Amaurosis, Blurred vision, Spots in vision, 

Field loss, Vision loss, Dipolpia





- Ears, Nose & Throat


Ears, Nose & Throat: denies: Ear pain, Hearing loss, Hearing aids, Tinnitus, 

Vertigo, Nasal pain, Nasal discharge, Nosebleeds, Nasal obstruction, Nasal 

congestion, Sore throat, Hoarseness, Mouth lesions, Bleeding gums





- Cardiovascular


Cariovascular: reports: Chest pain.  denies: Irregular heart rate, Palpitations

, Lightheadedness, Syncope, Exertional dyspnea, Decr. exercise tolerance, 

Orthopnea





- Respiratory


Respiratory: denies: Cough, Sputum production, Wheezing, Hemoptysis, SOB at rest

, SOB with exertion, Pleuritic pain





- Gastrointestinal


Gastrointestinal: denies: Abdominal pain, Abdominal distention, Constipation, 

Diarrhea, Change in bowel habits, Black stools, Bloody stools, Nausea, Vomiting

, Coffee grounds emesis, Poor appetite





- Genitourinary


Genitourinary: denies: Dysuria, Frequency, Urgency, Incontinence





- Musculoskeletal


Musculoskeletal: reports: Muscle pain, Back pain, Muscle aches, Stiffness.  

denies: Limited range of motion, Muscle weakness, Gout, Joint pain, Joint 

swelling





- Integumentary


Integumentary: denies: Rash, Pruritis, Lesions, Dryness





- Neurological


Neurological: denies: General weakness, Focal weakness, Headache, Dizziness, 

Numbness, Memory problems, Pre-existing deficit, Abnormal gait, Seizures, 

Slurred speech





- Psychiatric


Psychiatric: reports: Depression, Anxiety.  denies: Suicidal, Delusions, 

Hallucinations





- Endocrine


Endocrine: denies: Polyuria, Polydypsia, Polyphagia





History





- Past Medical History


Cardiovascular: reports: None


Respiratory: reports: None


Neuro: reports: None


Endocrine/Autoimmune: reports: None


GI: reports: None


: reports: None


HEENT: reports: None


Psych: reports: Depression, Anxiety


Musculoskeletal: reports: Fatigue


Derm: reports: None, Other


MRSA Hx?: No


Other Past Medical History: 1 renal tubular acidosis type II with severe 

hypokalemia.  2 depression.  3 anxiety.  4 homelessness.  5 noncompliance with 

medication and followup appointments.  6 tobacco abuse.  7 marijuana abuse





- Family & Social History


Family History Comment/Other: 





The patient has a cousin who he refers to as a sister who has Gittleman's 

disease. His mother has hypertension and depression. His father has diabetes 

and mental illness. His uncle and grandmother had coronary artery disease and 

 in their mid 50s.


Living arrangement: Homeless


Living Situation: With family


Social History Notes: Patient is currently homeless and lives with his parents 

in a band. He and his family are originally from [place]. He is part of a native 

tried out of [place]. The patient moved to [facility] with his family more 

than 3 years ago. He suffers from a number of mental health issues including 

depression, anxiety, and anger disorder. The patient is unemployed. The patient 

states his and family's thing about moving out to [place].





- Substance History


Use: Uses substance without health or social issues: Tobacco, Cannabis


Abuse: Recurrent use of substance despite neg consequences: NONE


Dependence: Experiences withdrawal or developed tolerances: NONE


Tobacco Details: Cigarettes





- POLST


Patient has POLST: No


POLST Status: Full Code





Meds/Allgy





- Home Medications


Home Medications: 


 Ambulatory Orders











 Medication  Instructions  Recorded  Confirmed


 


Trazodone HCl 50 mg PO QPM PRN 16


 


Risperidone [Risperdal] 2 mg PO QPM 30 Days 17


 


Spironolactone 25 mg PO BID 30 Days 17


 


Buspirone HCl 5 mg PO BID 17


 


Citalopram Hydrobromide 20 mg PO BID 17





[Citalopram HBr]   


 


Lorazepam 0.5 mg PO TID PRN 17


 


Propranolol HCl 10 mg PO BID 17


 


Potass Cit/Citric Acid Oral [Pot 4 each PO Q6H 17





Citrate-Citric Acid]   


 


HYDROcod/ACETAM 5/325 [Norco 5/325] 1 tab PO Q6HR PRN 17














- Allergies


Allergies/Adverse Reactions: 


 Allergies











Allergy/AdvReac Type Severity Reaction Status Date / Time


 


No Known Drug Allergies Allergy   Verified 17 16:07














Exam





- Vital Signs


Reviewed Vital Signs: Yes


Vital Signs: 





 Vital Signs x48h











  Temp Pulse Resp BP Pulse Ox


 


 17 16:04  36.4 C L  114 H  18  135/87 H  100














- Physical Exam


General Appearance: positive: Alert, Moderate distress (pain  and anxious)


Eyes Bilateral: positive: Normal inspection, PERRL, EOMI, No lid inflammation, 

Conjunctivae nml, No scleral icterus


ENT: positive: ENT inspection nml, Pharynx nml, Dry mucous membranes.  negative

: Purulent nasal drainage, Pharyngeal erythema, Oral lesions


Neck: positive: Nml inspection, Thyroid nml, No JVD.  negative: Trachea midline

, Thyromegaly, Lymphadenopathy (R), Lymphadenopathy (L), Carotid bruit, 

Tracheal deviation


Respiratory: positive: Chest non-tender, No respiratory distress, Breath sounds 

nml.  negative: Wheezes, Rales, Rhonchi


Cardiovascular: positive: No murmur, No gallop, Tachycardia


Peripheral Pulses: positive: 2+


Abdomen: positive: Non-tender, No organomegaly, Nml bowel sounds, No 

distention.  negative: Guarding, Rebound, Hepatomegaly, Splenomegaly


Back: positive: Nml inspection.  negative: CVA tenderness (R), CVA tenderness (L

)


Skin: positive: Color nml, No rash, Warm, Dry


Extremities: positive: Full ROM, Nml appearance, No pedal edema, Other (

Tenderness of upper and lower extremity muscles. Patient has spasms in the 

upper extremity.).  negative: Joint swelling


Neurologic/Psychiatric: positive: Oriented x3, CN's nml (2-12), Motor nml, 

Sensation nml, Mood/affect nml





Conclusion/Plan





- Problem List


(1) Hypokalemia


Conclusion/Plan: 


Patient has severe hypokalemia secondary to RTA type II and noncompliance with 

medication. The patient's presentation to the emergency department was 2 to 

muscle pain in the upper and lower extremities. This is his usual presentation 

when he does have hypokalemia.


Cause of the patient's hypokalemia is RTA type II and noncompliance with 

medication.





Plan


Patient will be given to potassium supplementation. We will give him potassium 

riders to IV every hour and oral potassium every 4 hours. We will check his 

potassium every 4 hours. Patient be placed on telemetry. Patient be restarted 

on his home dose of spironolactone. Patient will be counseled again on the need 

to be compliant with his medications.








(2) Renal tubular acidosis type II


Conclusion/Plan: 


Likely cause of #1.


Patient is on spironolactone at home and oral potassium supplements. 


Patient is noncompliant with medications he was counseled again on need for 

compliance.








(3) Depression with anxiety


Conclusion/Plan: 


Patient will be continued on his home medications for depression and anxiety. 

Next line patient currently appears to be quite calm although he does state he 

does continue to have depression. He does not have any suicidal ideations.








(4) High anion gap metabolic acidosis


Conclusion/Plan: 


Likely secondary to renal tubular acidosis.


Agree worsened from patient being dehydrated. 


We will treat patient with IV fluids and monitor.


patient is not in DKA and has not ingested any new substances.  The patient is 

in his normal mental status.








(5) Prophylactic use of low molecular weight heparin for venous thromboembolism


Conclusion/Plan: 


placed on Lovenox while hospitalized








- Lab Results


Lab results reviewed: Yes


Umair Bones: 


 17 17:20





- Diagnostic Imaging Results


Diagnostic Imaging Results: positive: Final report reviewed





- EKG Results


EKG Interpreted Independently: Yes





Issues/Core Measures





- Anticipated LOS


Anticipated Stay Length: 2 or more midnights





- DVT/VTE - Prophylaxis


VTE/DVT Prophylaxis med ordered at admit?: Yes

## 2017-06-17 NOTE — ED PHYSICIAN DOCUMENTATION
History of Present Illness





- Stated complaint


Stated Complaint: MUSCLE PX





- Chief complaint


Chief Complaint: General





- History obtained from


History obtained from: Patient





- Additonal information


Additional information: 





This is a 27-year-old gentleman who has a history of potassium wasting renal 

tubular acidosis which necessitates multiple and frequent emergency department 

visits for hypokalemia related to medication noncompliance.  He has missed 

several doses of his medication and complains of severe and significant muscle 

aches and weakness consistent with prior episodes of hypokalemia.  He has 

missed several doses of his home medications.





Review of Systems


Ten Systems: 10 systems reviewed and negative


Constitutional: reports: Reviewed and negative


Throat: reports: Reviewed and negative


Cardiac: reports: Reviewed and negative


Respiratory: reports: Reviewed and negative





PD PAST MEDICAL HISTORY





- Past Medical History


Cardiovascular: None


Respiratory: None


Neuro: None


Endocrine/Autoimmune: None


GI: None


: None


HEENT: None


Psych: Depression, Anxiety


Musculoskeletal: Fatigue


Derm: None, Other


Other Past Medical History: hypokalemia





- Past Surgical History


Past Surgical History: Yes





- Present Medications


Home Medications: 


 Ambulatory Orders











 Medication  Instructions  Recorded  Confirmed


 


Trazodone HCl 50 mg PO QPM PRN 06/13/16 06/17/17


 


Risperidone [Risperdal] 2 mg PO QPM 30 Days 02/26/17 06/17/17


 


Spironolactone 25 mg PO BID 30 Days 03/20/17 06/17/17


 


Buspirone HCl 5 mg PO BID 05/03/17 06/17/17


 


Citalopram Hydrobromide 20 mg PO BID 05/03/17 06/17/17





[Citalopram HBr]   


 


Lorazepam 0.5 mg PO TID PRN 05/03/17 06/17/17


 


Propranolol HCl 10 mg PO BID 05/03/17 06/17/17


 


Potass Cit/Citric Acid Oral [Pot 4 each PO Q6H 05/30/17 06/17/17





Citrate-Citric Acid]   


 


HYDROcod/ACETAM 5/325 [Norco 5/325] 1 tab PO Q6HR PRN 06/05/17 06/17/17














- Allergies


Allergies/Adverse Reactions: 


 Allergies











Allergy/AdvReac Type Severity Reaction Status Date / Time


 


No Known Drug Allergies Allergy   Verified 06/17/17 16:07














- Social History


Does the pt smoke?: Yes


Smoking Status: Former smoker


Does the pt drink ETOH?: No


Does the pt have substance abuse?: Yes


Substance Use and Type: Marijuana





- Family History


Family history: reports: Non contributory





- Immunizations


Immunizations are current?: Yes





- POLST


Patient has POLST: No


POLST Status: Full Code





PD ED PE NORMAL





- Vitals


Vital signs reviewed: Yes





- General


General: Alert and oriented X 3, Other (he is able to walk)





- HEENT


HEENT: PERRL, EOMI





- Neck


Neck: Supple, no meningeal sign, No bony TTP





- Cardiac


Cardiac: RRR, No murmur





- Respiratory


Respiratory: No respiratory distress, Clear bilaterally





- Abdomen


Abdomen: Normal bowel sounds, Soft, Non tender





- Back


Back: No CVA TTP, No spinal TTP





- Derm


Derm: Normal color, Warm and dry





- Extremities


Extremities: No edema, No calf tenderness / cord





- Neuro


Neuro: Alert and oriented X 3, No motor deficit, No sensory deficit, Normal 

speech





- Psych


Psych: Other (seems despondent)





Results





- Vitals


Vitals: 


 Vital Signs - 24 hr











  06/17/17





  16:04


 


Temperature 36.4 C L


 


Heart Rate 114 H


 


Respiratory 18





Rate 


 


Blood Pressure 135/87 H


 


O2 Saturation 100








 Oxygen











O2 Source                      Room air

















- Labs


Labs: 


 Laboratory Tests











  06/17/17





  17:20


 


Sodium  132 L


 


Potassium  < 1.5 L*


 


Chloride  100 L


 


Carbon Dioxide  17 L


 


Anion Gap  15.0 H


 


BUN  21 H


 


Creatinine  1.0


 


Estimated GFR (MDRD)  88 L


 


Glucose  106 H


 


Calcium  9.8


 


Phosphorus  2.2 L


 


Magnesium  2.6


 


Total Bilirubin  0.6


 


AST  29


 


ALT  66 H


 


Alkaline Phosphatase  123 H


 


Total Protein  9.2 H


 


Albumin  5.0


 


Globulin  4.2


 


Albumin/Globulin Ratio  1.2


 


Lipase  63 H














PD MEDICAL DECISION MAKING





- ED course


ED course: 





27-year-old gentleman with profound hypokalemia, administered potassium both 

orally and IV, call to the hospitalist for admission at 604 p.m.





Departure





- Departure


Disposition: 66 [facility] DC/Xfer


Clinical Impression: 


 Renal tubular acidosis type II, Hypokalemia


Condition: Serious


Discharge Date/Time: 06/17/17 18:36

## 2017-06-18 VITALS — DIASTOLIC BLOOD PRESSURE: 85 MMHG | SYSTOLIC BLOOD PRESSURE: 125 MMHG

## 2017-06-18 LAB
ALBUMIN/GLOB SERPL: 1 {RATIO} (ref 1–2.2)
ANION GAP SERPL CALCULATED.4IONS-SCNC: 10 MMOL/L (ref 6–13)
ANION GAP SERPL CALCULATED.4IONS-SCNC: 11 MMOL/L (ref 6–13)
ANION GAP SERPL CALCULATED.4IONS-SCNC: 13 MMOL/L (ref 6–13)
BASOPHILS NFR BLD AUTO: 0.2 10^3/UL (ref 0–0.1)
BASOPHILS NFR BLD AUTO: 1.3 %
BILIRUB BLD-MCNC: 0.9 MG/DL (ref 0.2–1)
BUN SERPL-MCNC: 16 MG/DL (ref 6–20)
BUN SERPL-MCNC: 17 MG/DL (ref 6–20)
BUN SERPL-MCNC: 19 MG/DL (ref 6–20)
CALCIUM UR-MCNC: 8.4 MG/DL (ref 8.5–10.3)
CALCIUM UR-MCNC: 8.5 MG/DL (ref 8.5–10.3)
CALCIUM UR-MCNC: 8.9 MG/DL (ref 8.5–10.3)
CHLORIDE SERPL-SCNC: 100 MMOL/L (ref 101–111)
CHLORIDE SERPL-SCNC: 98 MMOL/L (ref 101–111)
CHLORIDE SERPL-SCNC: 99 MMOL/L (ref 101–111)
CO2 SERPL-SCNC: 23 MMOL/L (ref 21–32)
CO2 SERPL-SCNC: 23 MMOL/L (ref 21–32)
CO2 SERPL-SCNC: 27 MMOL/L (ref 21–32)
CREAT SERPLBLD-SCNC: 1.1 MG/DL (ref 0.6–1.2)
CREAT SERPLBLD-SCNC: 1.1 MG/DL (ref 0.6–1.2)
CREAT SERPLBLD-SCNC: 1.2 MG/DL (ref 0.6–1.2)
EOSINOPHIL # BLD AUTO: 0.5 10^3/UL (ref 0–0.7)
EOSINOPHIL NFR BLD AUTO: 3.3 %
ERYTHROCYTE [DISTWIDTH] IN BLOOD BY AUTOMATED COUNT: 14.5 % (ref 12–15)
GFRSERPLBLD MDRD-ARVRAT: 72 ML/MIN/{1.73_M2} (ref 89–?)
GFRSERPLBLD MDRD-ARVRAT: 79 ML/MIN/{1.73_M2} (ref 89–?)
GFRSERPLBLD MDRD-ARVRAT: 79 ML/MIN/{1.73_M2} (ref 89–?)
GLOBULIN SER-MCNC: 3.6 G/DL (ref 2.1–4.2)
GLUCOSE SERPL-MCNC: 104 MG/DL (ref 70–100)
GLUCOSE SERPL-MCNC: 128 MG/DL (ref 70–100)
GLUCOSE SERPL-MCNC: 185 MG/DL (ref 70–100)
HCT VFR BLD AUTO: 46 % (ref 42–52)
HGB UR QL STRIP: 15.8 G/DL (ref 14–18)
LYMPHOCYTES # SPEC AUTO: 3.5 10^3/UL (ref 1.5–3.5)
LYMPHOCYTES NFR BLD AUTO: 24.3 %
MAGNESIUM SERPL-MCNC: 2.3 MG/DL (ref 1.7–2.8)
MCH RBC QN AUTO: 28.3 PG (ref 27–31)
MCHC RBC AUTO-ENTMCNC: 34.3 G/DL (ref 32–36)
MCV RBC AUTO: 82.5 FL (ref 80–94)
MONOCYTES # BLD AUTO: 1.1 10^3/UL (ref 0–1)
MONOCYTES NFR BLD AUTO: 7.7 %
NEUTROPHILS # BLD AUTO: 9 10^3/UL (ref 1.5–6.6)
NEUTROPHILS # SNV AUTO: 14.2 X10^3/UL (ref 4.8–10.8)
NEUTROPHILS NFR BLD AUTO: 63.4 %
NRBC # BLD AUTO: 0.1 /100WBC
PDW BLD AUTO: 6.8 FL (ref 7.4–11.4)
PHOSPHATE BLD-MCNC: 2.4 MG/DL (ref 2.5–4.6)
POTASSIUM SERPL-SCNC: 1.8 MMOL/L (ref 3.5–5)
POTASSIUM SERPL-SCNC: 2.5 MMOL/L (ref 3.5–5)
POTASSIUM SERPL-SCNC: 3.2 MMOL/L (ref 3.5–5)
PROT SPEC-MCNC: 7.3 G/DL (ref 6.7–8.2)
RBC MAR: 5.57 10^6/UL (ref 4.7–6.1)
SODIUM SERPLBLD-SCNC: 132 MMOL/L (ref 135–145)
SODIUM SERPLBLD-SCNC: 134 MMOL/L (ref 135–145)
SODIUM SERPLBLD-SCNC: 138 MMOL/L (ref 135–145)
WBC # BLD: 14.2 X10^3/UL

## 2017-06-18 RX ADMIN — SODIUM PHOSPHATE, DIBASIC, ANHYDROUS, POTASSIUM PHOSPHATE, MONOBASIC, AND SODIUM PHOSPHATE, MONOBASIC, MONOHYDRATE SCH MG: 852; 155; 130 TABLET, COATED ORAL at 09:58

## 2017-06-18 RX ADMIN — HYDROCODONE BITARTRATE AND ACETAMINOPHEN PRN TAB: 5; 325 TABLET ORAL at 06:08

## 2017-06-18 RX ADMIN — POTASSIUM CHLORIDE SCH MLS/HR: 7.46 INJECTION, SOLUTION INTRAVENOUS at 06:08

## 2017-06-18 RX ADMIN — POTASSIUM CHLORIDE SCH MLS/HR: 7.46 INJECTION, SOLUTION INTRAVENOUS at 08:59

## 2017-06-18 RX ADMIN — POTASSIUM CITRATE AND CITRIC ACID SCH EACH: 3.3; 1.002 GRANULE, FOR SOLUTION ORAL at 06:08

## 2017-06-18 RX ADMIN — POTASSIUM CHLORIDE SCH MLS/HR: 7.46 INJECTION, SOLUTION INTRAVENOUS at 10:24

## 2017-06-18 RX ADMIN — SODIUM CHLORIDE, PRESERVATIVE FREE SCH: 5 INJECTION INTRAVENOUS at 05:47

## 2017-06-18 RX ADMIN — HYDROCODONE BITARTRATE AND ACETAMINOPHEN PRN TAB: 5; 325 TABLET ORAL at 02:02

## 2017-06-18 RX ADMIN — CITALOPRAM HYDROBROMIDE SCH MG: 10 TABLET ORAL at 08:39

## 2017-06-18 RX ADMIN — POTASSIUM CHLORIDE SCH MLS/HR: 7.46 INJECTION, SOLUTION INTRAVENOUS at 07:32

## 2017-06-18 RX ADMIN — SODIUM PHOSPHATE, DIBASIC, ANHYDROUS, POTASSIUM PHOSPHATE, MONOBASIC, AND SODIUM PHOSPHATE, MONOBASIC, MONOHYDRATE SCH MG: 852; 155; 130 TABLET, COATED ORAL at 08:39

## 2017-06-18 RX ADMIN — SODIUM CHLORIDE AND POTASSIUM CHLORIDE SCH MLS/HR: 9; 1.49 INJECTION, SOLUTION INTRAVENOUS at 05:34

## 2017-06-18 RX ADMIN — POTASSIUM CHLORIDE SCH MLS/HR: 7.46 INJECTION, SOLUTION INTRAVENOUS at 04:48

## 2017-06-18 RX ADMIN — SPIRONOLACTONE SCH MG: 25 TABLET, FILM COATED ORAL at 08:40

## 2017-06-18 RX ADMIN — POTASSIUM CHLORIDE SCH MLS/HR: 7.46 INJECTION, SOLUTION INTRAVENOUS at 00:27

## 2017-06-18 RX ADMIN — POTASSIUM CHLORIDE SCH MLS/HR: 7.46 INJECTION, SOLUTION INTRAVENOUS at 03:23

## 2017-06-18 RX ADMIN — POTASSIUM CHLORIDE SCH MLS/HR: 7.46 INJECTION, SOLUTION INTRAVENOUS at 01:54

## 2017-06-18 RX ADMIN — PROPRANOLOL HYDROCHLORIDE SCH MG: 10 TABLET ORAL at 08:42

## 2017-06-18 RX ADMIN — POTASSIUM CITRATE AND CITRIC ACID SCH EACH: 3.3; 1.002 GRANULE, FOR SOLUTION ORAL at 11:54

## 2017-06-18 RX ADMIN — POTASSIUM CITRATE AND CITRIC ACID SCH EACH: 3.3; 1.002 GRANULE, FOR SOLUTION ORAL at 00:27

## 2017-06-18 RX ADMIN — HYDROCODONE BITARTRATE AND ACETAMINOPHEN PRN TAB: 5; 325 TABLET ORAL at 09:58

## 2017-06-18 NOTE — DISCHARGE PLAN
Discharge Plan


Disposition: 01 Home, Self Care


Condition: Fair


Prescriptions: 


HYDROcod/ACETAM 5/325 [Norco 5/325] 1 tab PO Q6HR PRN #12 tablet


 PRN Reason: Pain


Potassium Chloride 120 meq PO QID #500 tab.er.[email or web address]


Spironolactone 25 mg PO BID 30 Days


Diet: Regular


Activity Restrictions: Activity as Tolerated


Weight Bearing: Full Weight


Additional Instructions or Follow Up instructions: 


You presented with a low potassium and muscle pain. Your potassium improved 

with IV and oral potassium. It went low due to you missing doses of your 

medication. Please ensure you take your medications as prescribed to you. 


No Smoking: If you smoke, Please STOP!  Call [phone] for help.

## 2017-06-19 NOTE — DISCHARGE SUMMARY
DATE OF ADMISSION: 06/17/2017

 

DATE OF DISCHARGE: 06/18/2017

 

PRIMARY CARE PROVIDER:  JOSSUE Arroyo 

 

DISCHARGING PHYSICIAN:  Garth Samuel MD 

 

DISCHARGE DIAGNOSES 

1. Hypokalemia.

2. Renal tubular acidosis type 2.

3. Depression with anxiety.

4. High anion gap metabolic acidosis.

5. Prophylactic use of low molecular weight heparin. 

 

DISCHARGE MEDICATIONS 

1. Trazodone 50 mg p.o. q.p.m. p.r.n. for anxiety.

2. Risperdal 2 mg p.o. q.p.m.

3. Propranolol 10 mg p.o. b.i.d.

4. Lorazepam 0.5 mg p.o. t.i.d. p.r.n. for anxiety.

5. Citalopram 20 mg p.o. b.i.d.

6. Buspirone 5 mg p.o. b.i.d.

7. Potassium chloride 120 mEq p.o. q.i.d.

8. Spironolactone 25 mg p.o. b.i.d.

9. Norco 5/325 mg 1 tablet p.o. q.6 hours p.r.n. for pain. 

 

HOSPITAL COURSE: Patient is a 27-year-old gentleman, with a past medical history significant for sonya
l tubular acidosis type 2 with severe hypokalemia, depression, anxiety disorder, tobacco abuse, long 
history of noncompliance with medication, medical treatment, and leaving against medical advice on mu
ltiple occasions, who presented to the emergency department with a chief complaint of upper and lower
 body soreness. The patient states that symptoms started when he woke up earlier in the morning, stat
es that initially the pain was in the upper extremities but then moved down into the lower extremitie
s. The patient stated that usually this kind of pain occurs when his potassium level is low, so he ca
me in to the emergency department. 

 

The patient states that he had missed a few doses of his oral potassium pills, and on presentation to
 the emergency department, his potassium was less than 1.5, he continued to have soreness of the musc
les in his upper and lower extremities, and he was admitted to the ICU for potassium replacement, bot
h IV and p.o., as well as for telemetry monitoring. 

 

The patient received IV potassium riders and oral potassium, as well as spironolactone during the hos
pitalization. He had improvement in his potassium, which improved to 3.2, the following morning, the 
patient's symptoms of soreness had improved significantly. However, he still had some muscle soreness
. The patient was discharged to home with prescriptions for his oral potassium, spironolactone, and a
 few tablets of Norco, as his pain was still bothering him. 

 

The patient was also seen by our ; the  is trying to arrange a living situa
tion for the patient; the patient currently lives in a van with his parents.  is working
 on getting him into a group home. The patient has filled out the paperwork, but just needed followup
, which the  had done during this hospitalization. Hopefully patient is able to find a b
ac living situation and gets more stable and can get on his feet. The patient was also advised to 
quit smoking, and he had resolution of his metabolic acidosis. 

 

PHYSICAL EXAMINATION ON DISCHARGE

VITAL SIGNS: Temperature 36.3, heart rate 86, blood pressure 92/54, respiratory rate 19, O2 saturatio
n 98% on room air.

GENERAL: Patient is alert, able to answer my questions appropriately. He does not appear to be in any
 acute distress.

HEENT: Pupils are equal and reactive to light. Extraocular muscles are intact. Mucous membranes are m
oist. There is no conjunctival pallor or scleral icterus noted.

NECK: Supple. No thyromegaly. No JVD. Trachea is midline.

LYMPH NODES: There is no cervical or axillary lymphadenopathy noted.

CARDIOVASCULAR: S1, S2, regular rate and rhythm. No murmurs, rubs, or gallops.

LUNGS: Clear to auscultation bilaterally. No wheezes, rhonchi, or crackles.

ABDOMEN: Soft, nontender, nondistended. Bowel sounds are present in all 4 quadrants.

EXTREMITIES: There is no lower extremity edema. Peripheral pulses are palpable. There is no cyanosis 
or clubbing.

SKIN: No skin rash, lesions, cellulitis, or abscesses.

NEUROLOGIC: The patient is alert, oriented x3. Cranial nerves 2-12 grossly intact. Strength is grossl
y normal. Sensations are intact.

 

LABORATORY: WBC is 14.2, hemoglobin 15.8, hematocrit 46.0, platelet count 353. Sodium 138, potassium 
3.2, chloride 98, carbon dioxide 27, BUN 16, creatinine 1.2, glucose 185, calcium 8.4, phosphorus 2.4
, magnesium 2.3, total bilirubin 0.9, AST 25, ALT 49, alkaline phosphatase 85, total protein 7.3, alb
umin 3.7, lipase 63.

 

IMAGING: No imaging was performed during this hospitalization. 

 

FOLLOWUP/RECOMMENDATION: Patient is discharged home to follow up with his primary care physician. He 
was prescribed potassium and spironolactone, which he was advised to be compliant with. The patient w
as also seen by our  and had some paper work done to try to help him get into a group ho
me, as he is currently homeless and lives in his parents' van. 

 

The patient was discharged in stable condition, again counseled on needing to be compliant with medic
ations. However, this is something that a recurring theme, and he has been counseled many times in th
e past but usually returns within 1-2 weeks with similar episodes. The patient did recover sooner marquis
n what we would have expected, as he has had previous hospitalizations that have been for greater marquis
n 2 midnights. It is really quite unpredictable with him, as far as how long it will take for his pot
assium to return back to an acceptable range, and when potassium is less than 1.5, it is hard to say,
 as we do not know the exact amount of potassium in the patient's bloodstream.

 

Greater than 30 minutes was spent on discharge.

 

 

 

DD:06/18/2017 13:33:00  DT: 06/19/2017 02:58  JOB #: [id number]  EXT JOB #:[id number]

## 2017-06-24 ENCOUNTER — HOSPITAL ENCOUNTER (INPATIENT)
Dept: HOSPITAL 76 - ED | Age: 27
Discharge: LEFT BEFORE DISCHARGE | DRG: 700 | End: 2017-06-24
Attending: INTERNAL MEDICINE | Admitting: INTERNAL MEDICINE
Payer: MEDICAID

## 2017-06-24 VITALS — DIASTOLIC BLOOD PRESSURE: 79 MMHG | SYSTOLIC BLOOD PRESSURE: 114 MMHG

## 2017-06-24 DIAGNOSIS — F32.9: ICD-10-CM

## 2017-06-24 DIAGNOSIS — Z91.14: ICD-10-CM

## 2017-06-24 DIAGNOSIS — N25.89: Primary | ICD-10-CM

## 2017-06-24 DIAGNOSIS — F17.210: ICD-10-CM

## 2017-06-24 DIAGNOSIS — F41.9: ICD-10-CM

## 2017-06-24 LAB
ALBUMIN/GLOB SERPL: 1.2 {RATIO} (ref 1–2.2)
ANION GAP SERPL CALCULATED.4IONS-SCNC: 11 MMOL/L (ref 6–13)
ANION GAP SERPL CALCULATED.4IONS-SCNC: 12 MMOL/L (ref 6–13)
ANION GAP SERPL CALCULATED.4IONS-SCNC: 14 MMOL/L (ref 6–13)
BASOPHILS NFR BLD AUTO: 0.1 10^3/UL (ref 0–0.1)
BASOPHILS NFR BLD AUTO: 0.5 %
BILIRUB BLD-MCNC: 1 MG/DL (ref 0.2–1)
BUN SERPL-MCNC: 18 MG/DL (ref 6–20)
CA-I SERPL ISE-MCNC: 1.1 MMOL/L (ref 1.15–1.33)
CALCIUM UR-MCNC: 9 MG/DL (ref 8.5–10.3)
CALCIUM UR-MCNC: 9.3 MG/DL (ref 8.5–10.3)
CALCIUM UR-MCNC: 9.3 MG/DL (ref 8.5–10.3)
CHLORIDE SERPL-SCNC: 98 MMOL/L (ref 101–111)
CHLORIDE SERPL-SCNC: 99 MMOL/L (ref 101–111)
CHLORIDE SERPL-SCNC: 99 MMOL/L (ref 101–111)
CO2 SERPL-SCNC: 23 MMOL/L (ref 21–32)
CO2 SERPL-SCNC: 24 MMOL/L (ref 21–32)
CO2 SERPL-SCNC: 25 MMOL/L (ref 21–32)
CREAT SERPLBLD-SCNC: 1.1 MG/DL (ref 0.6–1.2)
CREAT SERPLBLD-SCNC: 1.2 MG/DL (ref 0.6–1.2)
CREAT SERPLBLD-SCNC: 1.3 MG/DL (ref 0.6–1.2)
EOSINOPHIL # BLD AUTO: 0.2 10^3/UL (ref 0–0.7)
EOSINOPHIL NFR BLD AUTO: 1.6 %
ERYTHROCYTE [DISTWIDTH] IN BLOOD BY AUTOMATED COUNT: 14.3 % (ref 12–15)
GFRSERPLBLD MDRD-ARVRAT: 65 ML/MIN/{1.73_M2} (ref 89–?)
GFRSERPLBLD MDRD-ARVRAT: 72 ML/MIN/{1.73_M2} (ref 89–?)
GFRSERPLBLD MDRD-ARVRAT: 79 ML/MIN/{1.73_M2} (ref 89–?)
GLOBULIN SER-MCNC: 3.9 G/DL (ref 2.1–4.2)
GLUCOSE SERPL-MCNC: 117 MG/DL (ref 70–100)
GLUCOSE SERPL-MCNC: 132 MG/DL (ref 70–100)
GLUCOSE SERPL-MCNC: 133 MG/DL (ref 70–100)
HCT VFR BLD AUTO: 50.4 % (ref 42–52)
HGB UR QL STRIP: 17.5 G/DL (ref 14–18)
LIPASE SERPL-CCNC: 46 U/L (ref 22–51)
LYMPHOCYTES # SPEC AUTO: 1.8 10^3/UL (ref 1.5–3.5)
LYMPHOCYTES NFR BLD AUTO: 11.8 %
MCH RBC QN AUTO: 28.2 PG (ref 27–31)
MCHC RBC AUTO-ENTMCNC: 34.8 G/DL (ref 32–36)
MCV RBC AUTO: 81.1 FL (ref 80–94)
MONOCYTES # BLD AUTO: 0.9 10^3/UL (ref 0–1)
MONOCYTES NFR BLD AUTO: 6.2 %
NEUTROPHILS # BLD AUTO: 12.1 10^3/UL (ref 1.5–6.6)
NEUTROPHILS # SNV AUTO: 15.1 X10^3/UL (ref 4.8–10.8)
NEUTROPHILS NFR BLD AUTO: 79.9 %
NRBC # BLD AUTO: 0.5 /100WBC
PDW BLD AUTO: 6.9 FL (ref 7.4–11.4)
PH BLDV: 7.45 [PH] (ref 7.31–7.41)
PLAT MORPH BLD: (no result)
PLATELET BLD QL SMEAR: (no result)
POTASSIUM SERPL-SCNC: 2.1 MMOL/L (ref 3.5–5)
POTASSIUM SERPL-SCNC: < 1.5 MMOL/L (ref 3.5–5)
POTASSIUM SERPL-SCNC: < 1.5 MMOL/L (ref 3.5–5)
PROT SPEC-MCNC: 8.6 G/DL (ref 6.7–8.2)
RBC MAR: 6.22 10^6/UL (ref 4.7–6.1)
SODIUM SERPLBLD-SCNC: 135 MMOL/L (ref 135–145)
WBC # BLD: 15.1 X10^3/UL

## 2017-06-24 PROCEDURE — 87150 DNA/RNA AMPLIFIED PROBE: CPT

## 2017-06-24 PROCEDURE — 99283 EMERGENCY DEPT VISIT LOW MDM: CPT

## 2017-06-24 PROCEDURE — 80053 COMPREHEN METABOLIC PANEL: CPT

## 2017-06-24 PROCEDURE — 85025 COMPLETE CBC W/AUTO DIFF WBC: CPT

## 2017-06-24 PROCEDURE — 83735 ASSAY OF MAGNESIUM: CPT

## 2017-06-24 PROCEDURE — 84100 ASSAY OF PHOSPHORUS: CPT

## 2017-06-24 PROCEDURE — 84484 ASSAY OF TROPONIN QUANT: CPT

## 2017-06-24 PROCEDURE — 96374 THER/PROPH/DIAG INJ IV PUSH: CPT

## 2017-06-24 PROCEDURE — 83690 ASSAY OF LIPASE: CPT

## 2017-06-24 PROCEDURE — 82550 ASSAY OF CK (CPK): CPT

## 2017-06-24 PROCEDURE — 36415 COLL VENOUS BLD VENIPUNCTURE: CPT

## 2017-06-24 PROCEDURE — 99285 EMERGENCY DEPT VISIT HI MDM: CPT

## 2017-06-24 PROCEDURE — 80048 BASIC METABOLIC PNL TOTAL CA: CPT

## 2017-06-24 PROCEDURE — 93005 ELECTROCARDIOGRAM TRACING: CPT

## 2017-06-24 PROCEDURE — 82330 ASSAY OF CALCIUM: CPT

## 2017-06-24 RX ADMIN — POTASSIUM CHLORIDE SCH MLS/HR: 7.46 INJECTION, SOLUTION INTRAVENOUS at 15:02

## 2017-06-24 RX ADMIN — POTASSIUM CHLORIDE SCH MLS/HR: 7.46 INJECTION, SOLUTION INTRAVENOUS at 17:34

## 2017-06-24 RX ADMIN — POTASSIUM CHLORIDE SCH MLS/HR: 7.46 INJECTION, SOLUTION INTRAVENOUS at 15:03

## 2017-06-24 NOTE — HISTORY & PHYSICAL EXAMINATION
DATE OF ADMISSION: 06/24/2017

 

PRIMARY CARE PHYSICIAN: Tori Kaur PA-C

 

CHIEF COMPLAINT: Weakness and soreness of muscles.

 

IDENTIFYING INFORMATION: The patient is a 27-year-old male who has RTA with recurrent severe hypokale
edison, depression, anxiety disorder, tobacco abuse, long history of noncompliance with medications. The
 patient has been here twice this month alone. The patient's personal history is of questionable accu
racy. The patient's history is supplemented by the personal review of past medical records, which are
 summarized below and the hand off from the emergency department physician. In addition to the examin
ation, the patient is used in the evaluation of this person and preparation of this document.

 

HISTORY OF PRESENT ILLNESS: The patient says he currently not taking his potassium packet regularly. 
Says he cannot remember to do it and his mom is gone, so she is not around to remind him. The patient
 has been feeling worse over the last several days and has soreness in his extremities upper and lowe
r.

 

REVIEW OF SYSTEMS: A complete review of systems reveals only the positives noted above. He has had no
 fever or chills. No sore throat. No cough. He has had no nausea, vomiting, diarrhea. No problems uri
nating. The patient was seen in the emergency department and found to have a potassium level less marquis
n 1.5.

 

PAST MEDICAL HISTORY REMARKABLE FOR

1. Renal tubular acidosis type.

2. Depression.

3. Anxiety.

4. Homeless intermittently.

5. Noncompliance with medications.

6. Tobacco abuse.

7. Marijuana abuse.

 

SOCIAL HISTORY: The patient is an [place] native. He moved to [facility] in 2013. He is unemploye
d. He smokes approximately 1/2 a pack a day, but again difficult to discern the reliability of this i
nformation.

 

FAMILY HISTORY: Mom has hypertension, depression. Dad has diabetes and mental illness. Has a cousin w
tracy he calls his sister who has Bartleman disease. The patient's has children. 

 

PAST SURGICAL HISTORY: None. 

 

ALLERGIES: HE HAS NO KNOWN ALLERGIES.

 

MEDICATIONS

1. Trazodone 50 mg at bedtime.

2. Risperidone 2 mg at bedtime.

3. Spironolactone 25 mg b.i.d.

4. Buspirone 5 mg twice a day.

5. Citalopram 20 mg b.i.d.

6. Lorazepam 0.5 mg t.i.d.

7. Propranolol 10 mg b.i.d.

8. Potassium citrate oral 4 packets of 40 mEq 4 times a day.

9. The patient also says he has Vicodin in his possession, but there is an agreement he has no narcot
ics during these admissions here. 

 

PHYSICAL EXAMINATION

VITAL SIGNS: Temperature 36.8, heart rate 75, respiratory 17, 142/85, 99 on room air saturation.

EYES: EOM within normal limits, PERRL, nonicteric.

CONSTITUTIONAL: A well-developed, well-nourished male, appears depressed affect.

NEURO: Cognition intact. Cranial nerves intact. Motor intact, although seems to have some lower motor
 or lower extremity weakness with sustained isotonic effort.

MOUTH AND THROAT: Moist mucous membranes. No other pathology noted in mouth or pharynx.

NECK: Supple. Nontender. No lymphadenopathy, no thyromegaly.

CHEST WALL: Nontender. Symmetric.

LUNGS: Clear. Good air movement bilaterally.

HEART: Normal sinus rhythm. No murmur, rubs, clicks heard.

ABDOMEN: Thick abdominal wall, soft, nontender, normal bowel sounds. No hepatosplenomegaly.

RECTAL/GENITAL: Exam is not done.

EXTREMITIES: No edema.

VASCULAR: Normal distal pulses posterior tibial bilaterally.

 

LABORATORY DATA: White count 15.1, 7.5 and 15 hemoglobin and hematocrit respectively, MCV is 81. Plat
elet count is 396. The patient's sodium is 135, potassium less than 1.5, chloride 99, CO2 is 24, BUN 
18, creatinine is 1.1. He has a glucose of 117, calcium is 9.3. Normal liver enzymes exception ALT wa
s slightly abnormal at 67. His creatine kinase is 103. Troponin less than 104, albumin is 4.7.

 

IMPRESSION

1. Recurrent severe hypokalemia.

2. RTA type 2.

3. Noncompliance with medical regime.

4. Depression. 

5. Tobacco abuse. 

 

SUMMARY: A 27-year-old male with RTA, depression/anxiety, tobacco abuse, and noncompliance who presen
ts hypokalemic and found to have a potassium of less than 1.5 and is admitted to the ICU for cardiac 
monitoring and potassium replacement. 

 

DISCUSSION/DECISION MAKING

1. Hypokalemia. He will be placed on telemetry monitor for electrical disturbances during this replac
ement. The patient will get a combination of both IV and p.o. potassium.

2. Depression. Usual medications will be continued, as well as with anxiety.

3. The patient's medical noncompliance will be reemphasized but at this point, has been a recurrent i
ssue which seems to have no remedy presently. 

 

The possible issues are 

1. CODE STATUS, which is FULL CODE.

2. Venous thromboembolism prophylaxis which will be Lovenox.

3. Diet, which will be regular.

4. Activity, which will be with assistance initially to out of bed.

5. Tubes and lines, which will be just 2 peripheral IVs to deliver the IV potassium.

6. Disposition: Given the severity of his disease the patient will require at least 2 nights to deter
mine if he is going to be able to be discharged safely.

7. Length of stay, which is expected to be 2 nights.

8. Disposition, which is expected to be outpatient home.

 

 

 

DD:06/24/2017 16:11:00  DT: 06/24/2017 17:54  JOB #: [id number]  EXT JOB #:[id number]

## 2017-06-24 NOTE — ED PHYSICIAN DOCUMENTATION
History of Present Illness





- Stated complaint


Stated Complaint: MUSCLE CRAMPS





- Chief complaint


Chief Complaint: General





- History obtained from


History obtained from: Patient





- History of Present Illness


Timing: Last night





- Additonal information


Additional information: 





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 

since discharge from the hospital 6 days ago. He reports that his mother is 

gone right now and she is not there to remind him to take his K+. He has 

developed weakness similar to prior episodes and pain in his back and legs 

similar to prior. He has not had vomiting or diarrhea. 





Review of Systems


Constitutional: reports: Myalgias, Fatigue.  denies: Fever, Chills


Eyes: denies: Decreased vision


Ears: denies: Ear pain


Nose: denies: Rhinorrhea / runny nose, Congestion


Throat: denies: Sore throat


Cardiac: denies: Chest pain / pressure, Palpitations


Respiratory: denies: Dyspnea, Cough


GI: denies: Abdominal Pain, Nausea, Vomiting, Constipation, Diarrhea


: denies: Dysuria, Frequency


Skin: denies: Rash


Musculoskeletal: reports: Neck pain, Back pain, Extremity pain


Neurologic: reports: Generalized weakness.  denies: Focal weakness, Numbness





PD PAST MEDICAL HISTORY





- Past Medical History


Cardiovascular: None


Respiratory: None


Neuro: None


Endocrine/Autoimmune: None


GI: None


: None


HEENT: None


Psych: Depression, Anxiety


Musculoskeletal: Fatigue


Derm: None, Other





- Past Surgical History


Past Surgical History: Yes





- Present Medications


Home Medications: 


 Ambulatory Orders











 Medication  Instructions  Recorded  Confirmed


 


Trazodone HCl 50 mg PO QPM PRN 06/13/16 06/24/17


 


Risperidone [Risperdal] 2 mg PO QPM 30 Days 02/26/17 06/24/17


 


Buspirone HCl 5 mg PO BID 05/03/17 06/24/17


 


Citalopram Hydrobromide 20 mg PO BID 05/03/17 06/24/17





[Citalopram HBr]   


 


Lorazepam 0.5 mg PO TID PRN 05/03/17 06/24/17


 


Propranolol HCl 10 mg PO BID 05/03/17 06/24/17


 


Potass Cit/Citric Acid Oral [Pot 4 each PO Q6H 05/30/17 06/24/17





Citrate-Citric Acid]   


 


HYDROcod/ACETAM 5/325 [Norco 5/325] 1 tab PO Q6HR PRN #12 tablet 06/18/17 06/24/ 17


 


Potassium Chloride 120 meq PO QID #500 tab.er.prt 06/18/17 06/24/17


 


Spironolactone 25 mg PO BID 30 Days 06/18/17 06/24/17














- Allergies


Allergies/Adverse Reactions: 


 Allergies











Allergy/AdvReac Type Severity Reaction Status Date / Time


 


No Known Drug Allergies Allergy   Verified 06/24/17 10:54














- Social History


Does the pt smoke?: Yes


Smoking Status: Former smoker


Does the pt drink ETOH?: No


Does the pt have substance abuse?: Yes





- Immunizations


Immunizations are current?: Yes





- POLST


Patient has POLST: No


POLST Status: Full Code





PD ED PE NORMAL





- Vitals


Vital signs reviewed: Yes (hypertensive )





- General


General: Alert and oriented X 3, Well developed/nourished, Other (The patient 

appears depressed with a flat affect. )





- HEENT


HEENT: Atraumatic, PERRL, EOMI





- Neck


Neck: Supple, no meningeal sign, No bony TTP





- Cardiac


Cardiac: RRR, No murmur





- Respiratory


Respiratory: No respiratory distress, Clear bilaterally





- Abdomen


Abdomen: Soft, Non tender





- Back


Back: No CVA TTP, No spinal TTP





- Derm


Derm: Normal color, Warm and dry, No rash





- Extremities


Extremities: No deformity, No edema





- Neuro


Neuro: Alert and oriented X 3, CNs 2-12 intact, No motor deficit, No sensory 

deficit, Normal speech





- Psych


Psych: Other (mood is withdrawn and the affect is flat. )





Results





- Vitals


Vitals: 


 Vital Signs - 24 hr











  06/24/17





  10:53


 


Temperature 36.8 C


 


Heart Rate 75


 


Respiratory 17





Rate 


 


Blood Pressure 142/85 H


 


O2 Saturation 99








 Oxygen











O2 Source                      Room air

















- Labs


Labs: 


 Laboratory Tests











  06/24/17 06/24/17 06/24/17





  11:26 11:26 11:26


 


WBC  15.1 H  


 


RBC  6.22 H  


 


Hgb  17.5  


 


Hct  50.4  


 


MCV  81.1  


 


MCH  28.2  


 


MCHC  34.8  


 


RDW  14.3  


 


Plt Count  396  


 


MPV  6.9 L  


 


Neut #  12.1 H  


 


Lymph #  1.8  


 


Mono #  0.9  


 


Eos #  0.2  


 


Baso #  0.1  


 


Absolute Nucleated RBC  0.07  


 


Nucleated RBCs  0.5  


 


Manual Slide Review  Indicated  


 


Platelet Estimate  NORMAL (130-450,000)  


 


Platelet Morphology  1+ LARGE PLATELETS  


 


RBC Morph Micro Appear  NORMAL APPEARANCE  


 


Sodium   135 


 


Potassium   < 1.5 L* 


 


Chloride   99 L 


 


Carbon Dioxide   24 


 


Anion Gap   12.0 


 


BUN   18 


 


Creatinine   1.1 


 


Estimated GFR (MDRD)   79 L 


 


Glucose   117 H 


 


Calcium   9.3 


 


Total Bilirubin   1.0 


 


AST   32 


 


ALT   67 H 


 


Alkaline Phosphatase   96 


 


Total Creatine Kinase   103 


 


Troponin I    < 0.04


 


Total Protein   8.6 H 


 


Albumin   4.7 


 


Globulin   3.9 


 


Albumin/Globulin Ratio   1.2 


 


Lipase   46 














PD MEDICAL DECISION MAKING





- ED course


Complexity details: reviewed old records, reviewed results, re-evaluated patient

, considered differential, d/w patient


ED course: 





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 

symptoms and this has usually taken overnight + to correct in the hospital. An 

IV is begun and he is given oral potassium bicarbonate and IV potassium. He 

will require hospitalization to bring the K+ up from <1.5. 





Departure





- Departure


Disposition: [facility] DC/Xfer


Clinical Impression: 


 Hypokalemia

## 2017-07-03 ENCOUNTER — HOSPITAL ENCOUNTER (OUTPATIENT)
Age: 27
Discharge: ANOTHER HOSPITAL | End: 2017-07-03
Payer: MEDICAID

## 2017-07-03 ENCOUNTER — HOSPITAL ENCOUNTER (OUTPATIENT)
Dept: HOSPITAL 76 - ED | Age: 27
Setting detail: OBSERVATION
Discharge: LEFT BEFORE DISCHARGE | End: 2017-07-03
Attending: INTERNAL MEDICINE | Admitting: INTERNAL MEDICINE
Payer: MEDICAID

## 2017-07-03 VITALS — SYSTOLIC BLOOD PRESSURE: 103 MMHG | DIASTOLIC BLOOD PRESSURE: 61 MMHG

## 2017-07-03 DIAGNOSIS — Z91.14: ICD-10-CM

## 2017-07-03 DIAGNOSIS — F32.9: ICD-10-CM

## 2017-07-03 DIAGNOSIS — F41.9: ICD-10-CM

## 2017-07-03 DIAGNOSIS — F12.20: ICD-10-CM

## 2017-07-03 DIAGNOSIS — R53.1: Primary | ICD-10-CM

## 2017-07-03 DIAGNOSIS — E86.0: ICD-10-CM

## 2017-07-03 DIAGNOSIS — F17.210: ICD-10-CM

## 2017-07-03 DIAGNOSIS — Z72.89: ICD-10-CM

## 2017-07-03 DIAGNOSIS — E66.9: ICD-10-CM

## 2017-07-03 DIAGNOSIS — Z53.20: ICD-10-CM

## 2017-07-03 DIAGNOSIS — N25.89: Primary | ICD-10-CM

## 2017-07-03 LAB
ALBUMIN/GLOB SERPL: 1.1 {RATIO} (ref 1–2.2)
ANION GAP SERPL CALCULATED.4IONS-SCNC: 10 MMOL/L (ref 6–13)
ANION GAP SERPL CALCULATED.4IONS-SCNC: 9 MMOL/L (ref 6–13)
BASOPHILS NFR BLD AUTO: 1 %
BILIRUB BLD-MCNC: 0.4 MG/DL (ref 0.2–1)
BUN SERPL-MCNC: 17 MG/DL (ref 6–20)
BUN SERPL-MCNC: 18 MG/DL (ref 6–20)
CALCIUM UR-MCNC: 8.6 MG/DL (ref 8.5–10.3)
CALCIUM UR-MCNC: 9.3 MG/DL (ref 8.5–10.3)
CHLORIDE SERPL-SCNC: 105 MMOL/L (ref 101–111)
CHLORIDE SERPL-SCNC: 107 MMOL/L (ref 101–111)
CK MB SERPL-MCNC: 14.6 NG/ML (ref 0.6–6.3)
CO2 SERPL-SCNC: 20 MMOL/L (ref 21–32)
CO2 SERPL-SCNC: 20 MMOL/L (ref 21–32)
CREAT SERPLBLD-SCNC: 0.9 MG/DL (ref 0.6–1.2)
CREAT SERPLBLD-SCNC: 1 MG/DL (ref 0.6–1.2)
EOSINOPHIL NFR BLD AUTO: 5.9 %
EOSINOPHIL NFR BLD MANUAL: 1 %
ERYTHROCYTE [DISTWIDTH] IN BLOOD BY AUTOMATED COUNT: 14.4 % (ref 12–15)
GFRSERPLBLD MDRD-ARVRAT: 100 ML/MIN/{1.73_M2} (ref 89–?)
GFRSERPLBLD MDRD-ARVRAT: 88 ML/MIN/{1.73_M2} (ref 89–?)
GLOBULIN SER-MCNC: 4 G/DL (ref 2.1–4.2)
GLUCOSE SERPL-MCNC: 113 MG/DL (ref 70–100)
GLUCOSE SERPL-MCNC: 124 MG/DL (ref 70–100)
HCT VFR BLD AUTO: 48.3 % (ref 42–52)
HGB UR QL STRIP: 16.7 G/DL (ref 14–18)
LIPASE SERPL-CCNC: 64 U/L (ref 22–51)
LYMPHOBLASTS # BLD: 20 %
LYMPHOCYTES NFR BLD AUTO: 21.6 %
MCH RBC QN AUTO: 28.2 PG (ref 27–31)
MCHC RBC AUTO-ENTMCNC: 34.5 G/DL (ref 32–36)
MCV RBC AUTO: 81.6 FL (ref 80–94)
MONOCYTES NFR BLD AUTO: 6.1 %
NEUTROPHILS # SNV AUTO: 8.5 X10^3/UL (ref 4.8–10.8)
NEUTROPHILS NFR BLD AUTO: 65.4 %
NEUTS BAND NFR BLD MANUAL: 71 %
NEUTS BAND NFR BLD: 0 %
NP AUTO DIFFERENTIAL?: YES
NP MAN DIFFERENTIAL?: NO
PDW BLD AUTO: 7 FL (ref 7.4–11.4)
PLAT MORPH BLD: (no result)
PLATELET BLD QL SMEAR: (no result)
POTASSIUM SERPL-SCNC: 1.5 MMOL/L (ref 3.5–5)
POTASSIUM SERPL-SCNC: < 1.5 MMOL/L (ref 3.5–5)
PROT SPEC-MCNC: 8.2 G/DL (ref 6.7–8.2)
RBC MAR: 5.92 10^6/UL (ref 4.7–6.1)
SODIUM SERPLBLD-SCNC: 135 MMOL/L (ref 135–145)
SODIUM SERPLBLD-SCNC: 136 MMOL/L (ref 135–145)
TOTAL CELLS COUNTED BLD: 100
TROPONIN I SERPL-MCNC: < 0.04 NG/ML (ref ?–0.49)
WBC # BLD: 8.5 X10^3/UL

## 2017-07-03 PROCEDURE — 82553 CREATINE MB FRACTION: CPT

## 2017-07-03 PROCEDURE — 83735 ASSAY OF MAGNESIUM: CPT

## 2017-07-03 PROCEDURE — 96376 TX/PRO/DX INJ SAME DRUG ADON: CPT

## 2017-07-03 PROCEDURE — 96375 TX/PRO/DX INJ NEW DRUG ADDON: CPT

## 2017-07-03 PROCEDURE — 80048 BASIC METABOLIC PNL TOTAL CA: CPT

## 2017-07-03 PROCEDURE — 36415 COLL VENOUS BLD VENIPUNCTURE: CPT

## 2017-07-03 PROCEDURE — 99284 EMERGENCY DEPT VISIT MOD MDM: CPT

## 2017-07-03 PROCEDURE — 99285 EMERGENCY DEPT VISIT HI MDM: CPT

## 2017-07-03 PROCEDURE — 80053 COMPREHEN METABOLIC PANEL: CPT

## 2017-07-03 PROCEDURE — 85025 COMPLETE CBC W/AUTO DIFF WBC: CPT

## 2017-07-03 PROCEDURE — 96361 HYDRATE IV INFUSION ADD-ON: CPT

## 2017-07-03 PROCEDURE — 87150 DNA/RNA AMPLIFIED PROBE: CPT

## 2017-07-03 PROCEDURE — 84132 ASSAY OF SERUM POTASSIUM: CPT

## 2017-07-03 PROCEDURE — 83690 ASSAY OF LIPASE: CPT

## 2017-07-03 PROCEDURE — 84484 ASSAY OF TROPONIN QUANT: CPT

## 2017-07-03 PROCEDURE — 82550 ASSAY OF CK (CPK): CPT

## 2017-07-03 PROCEDURE — 96374 THER/PROPH/DIAG INJ IV PUSH: CPT

## 2017-07-03 RX ADMIN — POTASSIUM CITRATE AND CITRIC ACID SCH EACH: 3.3; 1.002 GRANULE, FOR SOLUTION ORAL at 16:48

## 2017-07-03 RX ADMIN — SODIUM CHLORIDE, PRESERVATIVE FREE PRN ML: 5 INJECTION INTRAVENOUS at 17:51

## 2017-07-03 RX ADMIN — POTASSIUM CITRATE AND CITRIC ACID SCH EACH: 3.3; 1.002 GRANULE, FOR SOLUTION ORAL at 13:34

## 2017-07-03 RX ADMIN — SODIUM CHLORIDE, PRESERVATIVE FREE PRN ML: 5 INJECTION INTRAVENOUS at 13:34

## 2017-07-03 RX ADMIN — POTASSIUM CITRATE AND CITRIC ACID SCH EACH: 3.3; 1.002 GRANULE, FOR SOLUTION ORAL at 19:59

## 2017-07-03 NOTE — HISTORY & PHYSICAL EXAMINATION
DATE OF ADMISSION:  07/03/2017

 

CHIEF COMPLAINT: Profound  weakness, muscle aches, and hypokalemia. 

 

HISTORY OF PRESENT ILLNESS:  Patient is a 27-year-old gentleman, who has recurrent severe hypokalemia
 due to renal tubular acidosis, who presents with profound weakness and muscle aches.  Patient states
 he was a little sore yesterday and woke up this morning and could barely lift his arms.  He reports 
missing a dose of his spironolactone yesterday but taking his other medications as directed.  He callie
es fever or chills, cough or cold symptoms; denies chest pain, shortness of breath, nausea, vomiting,
 diarrhea, or constipation.  He has had a normal appetite up until today.  

 

REVIEW OF SYSTEMS:  Complete review of systems negative except for the ones stated in the HPI.  

 

PAST MEDICAL HISTORY 

1. Renal tubular acidosis.  

2. Depression. 

3. Anxiety. 

4. Homeless.

5. Noncompliance with medication. 

6. Marijuana dependence. 

 

SOCIAL HISTORY:  Patient is homeless, reports that he is living in his van.  He is unemployed on [employer].  He denies tobacco use; reports marijuana use and occasional alcohol use. 

 

FAMILY HISTORY:  Mother with hypertension and depression.  Father with diabetes and mental illness.  


 

PAST SURGICAL HISTORY:  None. 

 

ALLERGIES:  NO KNOWN MEDICAL ALLERGIES. 

 

MEDICATIONS  

1. Potassium. 

2. Spironolactone. 

 

PHYSICAL EXAMINATION

VITAL SIGNS:  Temperature 36.7, pulse 64, blood pressure 135/74, respiratory rate 18, oxygen saturati
on 98% on room air.

GENERAL:  Patient is an obese, young male.  He is alert, oriented, in no acute distress.

HEENT:  Pupils are equal, round, and reactive to light.  EOMI.  Anicteric.  No nystagmus.  Oropharynx
 is benign.

NECK:  Supple without adenopathy. 

HEART:  Regular rate and rhythm.  No murmurs, rubs, or gallops. 

LUNGS:  Clear to auscultation bilaterally.

ABDOMEN:  Obese, soft, nontender, nondistended.  No hepatosplenomegaly.  Positive bowel tones.

EXTREMITIES:  No clubbing, cyanosis, or edema. 

SKIN:  No rash or lesions.

MUSCULOSKELETAL:  Joints nontender.  No effusion. 

NEUROLOGIC:  Patient has profound generalized weakness and can barely lift his extremities off the be
d.  Intact to light touch. 

PSYCHOLOGIC:  Normal affect. 

 

ASSESSMENT AND PLAN

1.  Recurrent severe hypokalemia.  Patient will be admitted to the ICU for close monitoring and aggre
ssive potassium replacement.  In the emergency department, he recommended 20 mEq of potassium IV and 
60 mEq of potassium orally.  Upon review with the pharmacist and past admissions, patient will be shanthi
ated with potassium citrate packets, 4 packets every 3 hours.  His potassium will be checked every 4 
hours.  He received spironolactone 50 mg in the emergency department and will be continued at 50 mg t
wice daily; Neurontin 300 mg p.o. x1 for pain and discomfort. 

 

2.  History of medication noncompliance.  Encouraged patient to take potassium as directed.  Would in
crease spironolactone to 50 mg b.i.d.  

 

3.  Code status:  FULL.  

 

4.  Deep venous thrombosis prophylaxis:  Lovenox. 

 

 

 

DD:07/03/2017 15:01:00  DT: 07/03/2017 21:43  JOB #: [id number]  EXT JOB #:[id number]

## 2017-07-03 NOTE — ED PHYSICIAN DOCUMENTATION
History of Present Illness





- Stated complaint


Stated Complaint: GEN WEAKNESS





- Chief complaint


Chief Complaint: General





- History obtained from


History obtained from: Patient, EMS





- History of Present Illness


Timing: Enter  time (0600), Today





- Additonal information


Additional information: 





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 

admissions for low K+ usually related to not taking his supplementation states 

that he did take his doses of K+ yesterday and may have only missed one dose 

the day before awoke this morning with weakness and stiffness. He did spend the 

day yesterday outside fishing and he did have 3 tallboy beers and he usually 

does not drink. He has developed a cough and fatigue as well. He has a cough 

and wheezing this morning as well. 





Review of Systems


Constitutional: reports: Myalgias, Fatigue.  denies: Fever


Eyes: denies: Decreased vision


Ears: denies: Ear pain


Nose: reports: Rhinorrhea / runny nose, Congestion


Throat: denies: Sore throat


Cardiac: denies: Chest pain / pressure, Palpitations


Respiratory: reports: Cough, Wheezing.  denies: Dyspnea


GI: denies: Abdominal Pain, Nausea, Vomiting


: denies: Dysuria, Frequency


Skin: denies: Rash


Musculoskeletal: reports: Back pain, Extremity pain.  denies: Neck pain


Neurologic: reports: Generalized weakness.  denies: Focal weakness, Numbness, 

Difficulty speaking





PD PAST MEDICAL HISTORY





- Past Medical History


Cardiovascular: None


Respiratory: None


Neuro: None


Endocrine/Autoimmune: None


GI: None


: None


HEENT: None


Psych: Depression, Anxiety


Musculoskeletal: Fatigue


Derm: None, Other





- Past Surgical History


Past Surgical History: Yes





- Present Medications


Home Medications: 


 Ambulatory Orders











 Medication  Instructions  Recorded  Confirmed


 


Trazodone HCl 50 mg PO QPM PRN 06/13/16 07/03/17


 


Risperidone [Risperdal] 2 mg PO QPM 30 Days 02/26/17 07/03/17


 


Buspirone HCl 5 mg PO BID 05/03/17 07/03/17


 


Citalopram Hydrobromide 20 mg PO BID 05/03/17 07/03/17





[Citalopram HBr]   


 


Lorazepam 0.5 mg PO TID PRN 05/03/17 07/03/17


 


Propranolol HCl 10 mg PO BID 05/03/17 07/03/17


 


Potass Cit/Citric Acid Oral [Pot 4 each PO Q6H 05/30/17 07/03/17





Citrate-Citric Acid]   


 


HYDROcod/ACETAM 5/325 [Norco 5/325] 1 tab PO Q6HR PRN #12 tablet 06/18/17 07/03/ 17


 


Potassium Chloride 120 meq PO QID #500 tab.er.prt 06/18/17 07/03/17


 


Spironolactone 25 mg PO BID 30 Days 06/18/17 07/03/17














- Allergies


Allergies/Adverse Reactions: 


 Allergies











Allergy/AdvReac Type Severity Reaction Status Date / Time


 


No Known Drug Allergies Allergy   Verified 07/03/17 08:01














- Social History


Does the pt smoke?: Yes


Smoking Status: Current every day smoker


Does the pt drink ETOH?: No


Does the pt have substance abuse?: Yes





- Immunizations


Immunizations are current?: Yes





- POLST


Patient has POLST: No


POLST Status: Full Code





PD ED PE NORMAL





- General


General: Well developed/nourished





- HEENT


HEENT: Atraumatic, PERRL, EOMI, Other (both TM's are inflamed with rounding of 

the landmark. )





- Neck


Neck: Supple, no meningeal sign, No bony TTP





- Cardiac


Cardiac: RRR, No murmur





- Respiratory


Respiratory: No respiratory distress, Clear bilaterally





- Abdomen


Abdomen: Soft, Non tender





- Back


Back: No CVA TTP, No spinal TTP





- Derm


Derm: Normal color, No rash





- Extremities


Extremities: No deformity, No edema





- Neuro


Neuro: Alert and oriented X 3, CNs 2-12 intact, No sensory deficit, Normal 

speech, Other (The patient has generalized weakness)





- Psych


Psych: Normal mood, Normal affect





Results





- Vitals


Vitals: 


 Vital Signs - 24 hr











  07/03/17 07/03/17 07/03/17





  07:58 08:02 08:23


 


Temperature 36.7 C  


 


Heart Rate  71 63


 


Respiratory 18 11 L 18





Rate   


 


Blood Pressure  112/67 110/64


 


O2 Saturation  100 95








 Oxygen











O2 Source                      Room air

















- Labs


Labs: 


 Laboratory Tests











  07/03/17 07/03/17 07/03/17





  08:10 08:10 08:10


 


WBC  8.5  


 


RBC  5.92  


 


Hgb  16.7  


 


Hct  48.3  


 


MCV  81.6  


 


MCH  28.2  


 


MCHC  34.5  


 


RDW  14.4  


 


Plt Count  360  


 


MPV  7.0 L  


 


Manual Slide Review  Indicated  


 


Sodium   135 


 


Potassium   < 1.5 L* 


 


Chloride   105 


 


Carbon Dioxide   20 L 


 


Anion Gap   10.0 


 


BUN   18 


 


Creatinine   1.0 


 


Estimated GFR (MDRD)   88 L 


 


Glucose   124 H 


 


Calcium   9.3 


 


Total Bilirubin   0.4 


 


AST   28 


 


ALT   50 


 


Alkaline Phosphatase   99 


 


Total Creatine Kinase   462 H 


 


CK-MB (CK-2)    14.6 H


 


Troponin I    < 0.04


 


Total Protein   8.2 


 


Albumin   4.2 


 


Globulin   4.0 


 


Albumin/Globulin Ratio   1.1 


 


Lipase   64 H 














Procedures





- IVC sono (time)


  ** 0810


Bedside IVC sono: IVC measures (cm) (1.47), IVC collapsed c insp (cm) (complete)

, Dehydration (mild)





PD MEDICAL DECISION MAKING





- ED course


Complexity details: reviewed old records, reviewed results, re-evaluated patient

, considered differential, d/w patient


ED course: 





27-year-old male with a history of potassium wasting nephropathy presents this 

morning with acute weakness and a URI.  He did spend the day outside yesterday 

he is mildly dehydrated and today as opposed to all other visits he does state 

that he was taking his medications appropriately.  He notes his last dose of 

potassium was 200 this morning and he awoke at 0600 with weakness. He is found 

to be hypokalemic with a K+ of 1.4 and he is mildly dehydrated and with OM on 

exam. IV K+, saline and oral K+ are administered and the hospitalist is 

consulted for admission. 





Departure





- Departure


Disposition: [facility] DC/Xfer


Clinical Impression: 


 Hypokalemia


Condition: Stable

## 2017-07-04 NOTE — DISCHARGE SUMMARY
DATE OF ADMISSION:  07/03/2017

 

DATE OF DISCHARGE:  07/03/2017

 

The patient left against medical advice. 

 

DISCHARGE DIAGNOSES 

1. Hypokalemia. 

2. History of noncompliance with medication. 

3. Venous thromboembolism prophylaxis with low molecular weight heparin. 

4. Renal tubular acidosis. 

 

DISCHARGE MEDICATIONS

Patient did not require any scripts at the time of discharge.  His home medications are: 

1. Potassium chloride 120 mEq p.o. q.i.d. 

2. Trazodone 50 mg p.o. q.p.m. p.r.n. anxiety.

3. Spironolactone 25 mg p.o. b.i.d. 

4. Risperidone 2 mg p.o. q.p.m. 

5. Propranolol 10 mg p.o. b.i.d. 

6. Lorazepam 0.5 mg p.o. t.i.d. p.r.n. anxiety. 

7. Norco 5/325 mg 1 tablet p.o. q.6 hours p.r.n. for pain. 

8. Citalopram 20 mg p.o. b.i.d. 

9. Buspirone 5 mg p.o. b.i.d. 

 

HOSPITAL COURSE:  Patient is a 27-year-old gentleman with a past medical history significant for sonya
l tubular acidosis with recurrent severe hypokalemia, who presented to the emergency department with 
a chief complaint of profound weakness and muscle aches.  The patient states that he woke up a little
 sore yesterday and then this morning he could barely lift up his arms.  Patient states that he misse
d his dose of spironolactone yesterday but had been taking his other medications as directed.  Patien
t denies any fever or chills, cough, shortness of air, nausea, vomiting, or diarrhea.  Patient was ad
mitted to the intensive care unit, as he did have a potassium of less than 1.5 on presentation.  The 
patient was given IV and oral potassium replacement.  His potassium was up to 2.6 at around 7 p.m. on
 07/03/2017; at that point, patient stated that his symptoms had resolved and he wanted to go home.  
We requested that he wait for another recheck to ensure that his potassium continues to go up, as it 
had a very rapid rise from 1.5 to 2.6, and we felt that it would be best for him to stay and ensure t
hat it is truly up that high.  The patient, however, refused to stay.  He stated that he wanted to go
 fishing in the morning and was not willing to stay overnight.  He stated that he felt fine, and he w
ould take his medications at home.  Patient states this every time that he leaves; however, he return
s for hypokalemia within 1-2 weeks with what appears likely to be secondary to noncompliance with med
ication.  Unfortunately the patient has a poor social situation, where he is homeless, living with hi
s parents in their van, and has limited access to money and has been fired by previous physicians.  T
he patient also suffers from mental illness.  

 

PHYSICAL EXAMINATION AT DISCHARGE

VITAL SIGNS: Temperature of 36.7, heart rate 103, blood pressure 103/61, respiratory rate 18, O2 satu
ration 98% on room air.

GENERAL: Patient did not appear to be in any acute distress.  He was up trying to put on his clothes 
when he was signing the AMA paperwork.  He was alert and able to answer all my questions.

HEENT: Pupils appeared equal and reactive to light.  Extraocular muscles appeared to be intact.  No c
onjunctival pallor or scleral icterus noted.

NECK: Supple with no thyromegaly, no JVD.  Trachea was midline.

LYMPH NODES:  There was no cervical or axillary lymphadenopathy noted.

CARDIOVASCULAR: S1, S2, regular rate and rhythm.  No murmurs, rubs, or gallops. 

LUNGS: Clear to auscultation bilaterally.  No wheezes, rhonchi, or crackles.

ABDOMEN: Soft, nontender, nondistended.  Bowel sounds are present in all 4 quadrants. 

EXTREMITIES: No lower extremity edema.  Peripheral pulses palpable.  No cyanosis or clubbing.

SKIN: No skin rashes, lesions, cellulitis, or abscesses.

NEUROLOGIC: Patient is alert, oriented x3.  Cranial nerves 2-12 grossly intact.  Strength grossly nor
mal.  Sensations intact. 

 

LABORATORY AT DISCHARGE

WBC 8.5, hemoglobin 16.7, hematocrit 48.3, platelet count 360. 

 

Potassium 2.6, sodium 136, chloride 107, carbon dioxide 20, BUN 17, creatinine 0.9, glucose 113, calc
ium 8.6, magnesium 2.4, total bilirubin 0.4, AST 28, ALT 50, alk phos 99, total creatine kinase 462. 
 Troponin less than 0.04, total protein 8.2, albumin 4.2, lipase 64. 

 

IMAGING:  No imaging performed. 

 

FOLLOWUP/RECOMMENDATIONS:  Patient left against medical advice after he stated he felt better, as he 
wanted to go fishing in the morning.  Patient continues to be noncompliant with medications despite kaylin elise attempts to  him.  He was counseled once again this time about the need for compliance
 and the risk to his life if this continues.  The patient has also been spoken to by Social Work to sadia leal to help with his social situation.  There were some efforts made the last time he was here to get 
him into an adult family home.  The patient, however, continues to be homeless with his family and co
ntinues to be very noncompliant with his medical regimen.  Patient did sign AMA paperwork and did und
erstand the risk of him leaving against our advice.  Patient, however, did appear to be stable when h
e did leave.  

 

Greater than 30 minutes was spent on discharge. 

 

 

 

DD:07/03/2017 23:52:00  DT: 07/04/2017 22:59  JOB #: [id number]  EXT JOB #:[id number]

## 2017-07-31 ENCOUNTER — HOSPITAL ENCOUNTER (EMERGENCY)
Dept: HOSPITAL 76 - ED | Age: 27
Discharge: HOME | End: 2017-07-31
Payer: MEDICAID

## 2017-07-31 ENCOUNTER — HOSPITAL ENCOUNTER (OUTPATIENT)
Dept: HOSPITAL 76 - EMS | Age: 27
Discharge: ANOTHER HOSPITAL | End: 2017-07-31
Attending: SURGERY
Payer: MEDICAID

## 2017-07-31 VITALS — SYSTOLIC BLOOD PRESSURE: 123 MMHG | DIASTOLIC BLOOD PRESSURE: 70 MMHG

## 2017-07-31 DIAGNOSIS — R25.2: Primary | ICD-10-CM

## 2017-07-31 DIAGNOSIS — E87.6: Primary | ICD-10-CM

## 2017-07-31 DIAGNOSIS — N25.89: ICD-10-CM

## 2017-07-31 LAB
ALBUMIN/GLOB SERPL: 1.1 {RATIO} (ref 1–2.2)
ANION GAP SERPL CALCULATED.4IONS-SCNC: 12 MMOL/L (ref 6–13)
ANION GAP SERPL CALCULATED.4IONS-SCNC: 9 MMOL/L (ref 6–13)
BASOPHILS NFR BLD AUTO: 0 %
BASOPHILS NFR BLD AUTO: 0 10^3/UL (ref 0–0.1)
BILIRUB BLD-MCNC: 0.9 MG/DL (ref 0.2–1)
BUN SERPL-MCNC: 16 MG/DL (ref 6–20)
BUN SERPL-MCNC: 17 MG/DL (ref 6–20)
CALCIUM UR-MCNC: 8.8 MG/DL (ref 8.5–10.3)
CALCIUM UR-MCNC: 9.3 MG/DL (ref 8.5–10.3)
CHLORIDE SERPL-SCNC: 102 MMOL/L (ref 101–111)
CHLORIDE SERPL-SCNC: 102 MMOL/L (ref 101–111)
CO2 SERPL-SCNC: 18 MMOL/L (ref 21–32)
CO2 SERPL-SCNC: 24 MMOL/L (ref 21–32)
CREAT SERPLBLD-SCNC: 1.1 MG/DL (ref 0.6–1.2)
CREAT SERPLBLD-SCNC: 1.2 MG/DL (ref 0.6–1.2)
EOSINOPHIL # BLD AUTO: 0.5 10^3/UL (ref 0–0.7)
EOSINOPHIL NFR BLD AUTO: 2.5 %
ERYTHROCYTE [DISTWIDTH] IN BLOOD BY AUTOMATED COUNT: 14.3 % (ref 12–15)
GFRSERPLBLD MDRD-ARVRAT: 72 ML/MIN/{1.73_M2} (ref 89–?)
GFRSERPLBLD MDRD-ARVRAT: 79 ML/MIN/{1.73_M2} (ref 89–?)
GLOBULIN SER-MCNC: 4 G/DL (ref 2.1–4.2)
GLUCOSE SERPL-MCNC: 130 MG/DL (ref 70–100)
GLUCOSE SERPL-MCNC: 92 MG/DL (ref 70–100)
HCT VFR BLD AUTO: 51 % (ref 42–52)
HGB UR QL STRIP: 17.3 G/DL (ref 14–18)
LIPASE SERPL-CCNC: 57 U/L (ref 22–51)
LYMPHOCYTES # SPEC AUTO: 2.2 10^3/UL (ref 1.5–3.5)
LYMPHOCYTES NFR BLD AUTO: 11.8 %
MAGNESIUM SERPL-MCNC: 2.1 MG/DL (ref 1.7–2.8)
MCH RBC QN AUTO: 27.9 PG (ref 27–31)
MCHC RBC AUTO-ENTMCNC: 34 G/DL (ref 32–36)
MCV RBC AUTO: 82.1 FL (ref 80–94)
MONOCYTES # BLD AUTO: 1.1 10^3/UL (ref 0–1)
MONOCYTES NFR BLD AUTO: 5.7 %
NEUTROPHILS # BLD AUTO: 15.1 10^3/UL (ref 1.5–6.6)
NEUTROPHILS # SNV AUTO: 18.9 X10^3/UL (ref 4.8–10.8)
NEUTROPHILS NFR BLD AUTO: 80 %
NRBC # BLD AUTO: 0.3 /100WBC
PDW BLD AUTO: 7.3 FL (ref 7.4–11.4)
PHOSPHATE BLD-MCNC: 1.6 MG/DL (ref 2.5–4.6)
POTASSIUM SERPL-SCNC: 1.6 MMOL/L (ref 3.5–5)
POTASSIUM SERPL-SCNC: 2.2 MMOL/L (ref 3.5–5)
PROT SPEC-MCNC: 8.2 G/DL (ref 6.7–8.2)
RBC MAR: 6.21 10^6/UL (ref 4.7–6.1)
SODIUM SERPLBLD-SCNC: 132 MMOL/L (ref 135–145)
SODIUM SERPLBLD-SCNC: 135 MMOL/L (ref 135–145)
WBC # BLD: 18.9 X10^3/UL

## 2017-07-31 PROCEDURE — 85025 COMPLETE CBC W/AUTO DIFF WBC: CPT

## 2017-07-31 PROCEDURE — 80053 COMPREHEN METABOLIC PANEL: CPT

## 2017-07-31 PROCEDURE — 80048 BASIC METABOLIC PNL TOTAL CA: CPT

## 2017-07-31 PROCEDURE — 96375 TX/PRO/DX INJ NEW DRUG ADDON: CPT

## 2017-07-31 PROCEDURE — 96376 TX/PRO/DX INJ SAME DRUG ADON: CPT

## 2017-07-31 PROCEDURE — 96374 THER/PROPH/DIAG INJ IV PUSH: CPT

## 2017-07-31 PROCEDURE — 36415 COLL VENOUS BLD VENIPUNCTURE: CPT

## 2017-07-31 PROCEDURE — 99284 EMERGENCY DEPT VISIT MOD MDM: CPT

## 2017-07-31 PROCEDURE — 83735 ASSAY OF MAGNESIUM: CPT

## 2017-07-31 PROCEDURE — 84100 ASSAY OF PHOSPHORUS: CPT

## 2017-07-31 PROCEDURE — 83690 ASSAY OF LIPASE: CPT

## 2017-07-31 NOTE — ED PHYSICIAN DOCUMENTATION
History of Present Illness





- Stated complaint


Stated Complaint: DEHYDRATED





- History obtained from


History obtained from: Patient, EMS





- History of Present Illness


Timing: Other (27-year-old gentleman well-known to this emergency department at 

hospital with history of potassium wasting renal tubular acidosis with frequent 

admissions for hypokalemia and weakness.  Missed 1 dose of potassium yesterday 

and has only taken one today.  He feels weak and cannot walk.  Also has had 

increased activity, crabbing and fishing.)





Review of Systems


Ten Systems: 10 systems reviewed and negative


Constitutional: reports: Reviewed and negative


Throat: reports: Reviewed and negative


Cardiac: reports: Reviewed and negative


Respiratory: reports: Reviewed and negative





PD PAST MEDICAL HISTORY





- Past Medical History


Cardiovascular: None


Respiratory: None


Neuro: None


Endocrine/Autoimmune: None


GI: None


: None


HEENT: None


Psych: Depression, Anxiety


Musculoskeletal: Fatigue


Derm: None, Other





- Past Surgical History


Past Surgical History: Yes





- Present Medications


Home Medications: 


 Ambulatory Orders











 Medication  Instructions  Recorded  Confirmed


 


Trazodone HCl 50 mg PO QPM PRN 06/13/16 07/03/17


 


Risperidone [Risperdal] 2 mg PO QPM 30 Days 02/26/17 07/03/17


 


Buspirone HCl 5 mg PO BID 05/03/17 07/03/17


 


Citalopram Hydrobromide 20 mg PO BID 05/03/17 07/03/17





[Citalopram HBr]   


 


Lorazepam 0.5 mg PO TID PRN 05/03/17 07/03/17


 


Propranolol HCl 10 mg PO BID 05/03/17 07/03/17


 


HYDROcod/ACETAM 5/325 [Norco 5/325] 1 tab PO Q6HR PRN #12 tablet 06/18/17 07/03/ 17


 


Spironolactone 25 mg PO BID 30 Days 06/18/17 07/03/17


 


Potassium Citrate/Citric Acid 4 pkt PO Q6H 07/04/17 07/04/17





[Cytra-K Oral Solution]   














- Allergies


Allergies/Adverse Reactions: 


 Allergies











Allergy/AdvReac Type Severity Reaction Status Date / Time


 


No Known Drug Allergies Allergy   Verified 07/03/17 08:01














- Social History


Does the pt smoke?: Yes


Smoking Status: Never smoker


Does the pt drink ETOH?: No


Does the pt have substance abuse?: Yes





- Family History


Family history: reports: Non contributory





- Immunizations


Immunizations are current?: Yes





- POLST


Patient has POLST: No


POLST Status: Full Code





PD ED PE NORMAL





- Vitals


Vital signs reviewed: Yes





- General


General: Alert and oriented X 3, Other (Can lift his arms up a bit, not over 

his head, cannot walk)





- HEENT


HEENT: PERRL, EOMI





- Neck


Neck: Supple, no meningeal sign, No bony TTP





- Cardiac


Cardiac: RRR, No murmur





- Respiratory


Respiratory: No respiratory distress, Clear bilaterally





- Abdomen


Abdomen: Normal bowel sounds, Soft, Non tender





- Back


Back: No CVA TTP, No spinal TTP





- Derm


Derm: Normal color, Warm and dry





- Extremities


Extremities: No edema, No calf tenderness / cord





- Neuro


Neuro: Alert and oriented X 3, Normal speech





- Psych


Psych: Normal mood, Normal affect





Results





- Vitals


Vitals: 


 Vital Signs - 24 hr











  07/31/17 07/31/17 07/31/17





  15:03 15:38 19:10


 


Temperature 37.1 C  


 


Heart Rate 72 83 71


 


Respiratory 18 18 15





Rate   


 


Blood Pressure 121/90 H 108/70 108/57 L


 


O2 Saturation 98 97 97








 Oxygen











O2 Source                      Room air

















- Labs


Labs: 


 Laboratory Tests











  07/31/17 07/31/17 07/31/17





  15:17 15:17 19:08


 


WBC  18.9 H  


 


RBC  6.21 H  


 


Hgb  17.3  


 


Hct  51.0  


 


MCV  82.1  


 


MCH  27.9  


 


MCHC  34.0  


 


RDW  14.3  


 


Plt Count  380  


 


MPV  7.3 L  


 


Neut #  15.1 H  


 


Lymph #  2.2  


 


Mono #  1.1 H  


 


Eos #  0.5  


 


Baso #  0.0  


 


Absolute Nucleated RBC  0.05  


 


Nucleated RBCs  0.3  


 


Sodium   132 L  135


 


Potassium   1.6 L*  2.2 L*


 


Chloride   102  102


 


Carbon Dioxide   18 L  24


 


Anion Gap   12.0  9.0


 


BUN   17  16


 


Creatinine   1.1  1.2


 


Estimated GFR (MDRD)   79 L  72 L


 


Glucose   130 H  92


 


Calcium   9.3  8.8


 


Phosphorus   1.6 L 


 


Magnesium   2.1 


 


Total Bilirubin   0.9 


 


AST   29 


 


ALT   54 


 


Alkaline Phosphatase   89 


 


Total Protein   8.2 


 


Albumin   4.2 


 


Globulin   4.0 


 


Albumin/Globulin Ratio   1.1 


 


Lipase   57 H 














PD MEDICAL DECISION MAKING





- ED course


ED course: 





27-year-old gentleman with potassium wasting renal tubular acidosis with 

exacerbation of same due to some medication noncompliance and decreased 

activity.  He did not want to be admitted to the hospital despite his 

critically low potassium level and preferred treatment in the emergency 

department which has been successful in the past and over several hours he was 

given many potassium riders and several doses of oral potassium with 

improvement in his strength.  Repeat BMP was done with a potassium of 2.2 and 

he was given another round of medications after which his strength was 

basically back to normal and he wanted to go home and take his medication there.





Departure





- Departure


Disposition: 01 Home, Self Care


Clinical Impression: 


 Hypokalemia, Renal tubular acidosis type II


Condition: Good


Record reviewed to determine appropriate education?: Yes


Comments: 


Take your medications like you are supposed to at home.





Call your doctor to arrange a follow-up appointment, make the next available 

appointment.  In the interim, return anytime if worse or if new symptoms 

develop.

## 2017-09-18 ENCOUNTER — HOSPITAL ENCOUNTER (INPATIENT)
Dept: HOSPITAL 76 - ED | Age: 27
LOS: 1 days | Discharge: LEFT BEFORE DISCHARGE | DRG: 645 | End: 2017-09-19
Attending: SPECIALIST | Admitting: INTERNAL MEDICINE
Payer: MEDICAID

## 2017-09-18 ENCOUNTER — HOSPITAL ENCOUNTER (OUTPATIENT)
Dept: HOSPITAL 76 - EMS | Age: 27
Discharge: ANOTHER HOSPITAL | End: 2017-09-18
Attending: SURGERY
Payer: MEDICAID

## 2017-09-18 DIAGNOSIS — F41.9: ICD-10-CM

## 2017-09-18 DIAGNOSIS — Z63.9: ICD-10-CM

## 2017-09-18 DIAGNOSIS — F32.9: ICD-10-CM

## 2017-09-18 DIAGNOSIS — R46.89: Primary | ICD-10-CM

## 2017-09-18 DIAGNOSIS — F12.20: ICD-10-CM

## 2017-09-18 DIAGNOSIS — E26.81: Primary | ICD-10-CM

## 2017-09-18 DIAGNOSIS — Z91.14: ICD-10-CM

## 2017-09-18 LAB
ALBUMIN/GLOB SERPL: 1.1 {RATIO} (ref 1–2.2)
ANION GAP SERPL CALCULATED.4IONS-SCNC: 13 MMOL/L (ref 6–13)
APAP SERPL-MCNC: < 10 UG/ML (ref 10–30)
BASOPHILS NFR BLD AUTO: 0.1 10^3/UL (ref 0–0.1)
BASOPHILS NFR BLD AUTO: 0.7 %
BILIRUB BLD-MCNC: 0.7 MG/DL (ref 0.2–1)
BUN SERPL-MCNC: 14 MG/DL (ref 6–20)
CALCIUM UR-MCNC: 9.4 MG/DL (ref 8.5–10.3)
CHLORIDE SERPL-SCNC: 97 MMOL/L (ref 101–111)
CO2 SERPL-SCNC: 23 MMOL/L (ref 21–32)
CREAT SERPLBLD-SCNC: 1 MG/DL (ref 0.6–1.2)
CUL URINE ADD CHARGE: (no result)
EOSINOPHIL # BLD AUTO: 0.3 10^3/UL (ref 0–0.7)
EOSINOPHIL NFR BLD AUTO: 1.7 %
ERYTHROCYTE [DISTWIDTH] IN BLOOD BY AUTOMATED COUNT: 14.8 % (ref 12–15)
GFRSERPLBLD MDRD-ARVRAT: 88 ML/MIN/{1.73_M2} (ref 89–?)
GLOBULIN SER-MCNC: 3.9 G/DL (ref 2.1–4.2)
GLUCOSE SERPL-MCNC: 107 MG/DL (ref 70–100)
HCT VFR BLD AUTO: 51.3 % (ref 42–52)
HGB UR QL STRIP: 17.5 G/DL (ref 14–18)
LIPASE SERPL-CCNC: 64 U/L (ref 22–51)
LYMPHOCYTES # SPEC AUTO: 2.6 10^3/UL (ref 1.5–3.5)
LYMPHOCYTES NFR BLD AUTO: 16.5 %
MCH RBC QN AUTO: 28.2 PG (ref 27–31)
MCHC RBC AUTO-ENTMCNC: 34.1 G/DL (ref 32–36)
MCV RBC AUTO: 82.7 FL (ref 80–94)
MONOCYTES # BLD AUTO: 1.1 10^3/UL (ref 0–1)
MONOCYTES NFR BLD AUTO: 7 %
NEUTROPHILS # BLD AUTO: 11.9 10^3/UL (ref 1.5–6.6)
NEUTROPHILS # SNV AUTO: 16 X10^3/UL (ref 4.8–10.8)
NEUTROPHILS NFR BLD AUTO: 74.1 %
NRBC # BLD AUTO: 0.1 /100WBC
PDW BLD AUTO: 7 FL (ref 7.4–11.4)
PH UR STRIP.AUTO: 8 PH (ref 5–7.5)
POTASSIUM SERPL-SCNC: 1.5 MMOL/L (ref 3.5–5)
PROT SPEC-MCNC: 8 G/DL (ref 6.7–8.2)
RBC MAR: 6.21 10^6/UL (ref 4.7–6.1)
SALICYLATES SERPL-MCNC: < 6 MG/DL
SODIUM SERPLBLD-SCNC: 133 MMOL/L (ref 135–145)
SP GR UR STRIP.AUTO: 1.01 (ref 1–1.03)
UA CHARGE (STRIP ONLY): YES
UA W/ MICROSCOPIC CHARGE: (no result)
UR CULTURE IF IND: (no result)
UROBILINOGEN UR STRIP.AUTO-MCNC: NEGATIVE MG/DL
WBC # BLD: 16 X10^3/UL

## 2017-09-18 PROCEDURE — 80320 DRUG SCREEN QUANTALCOHOLS: CPT

## 2017-09-18 PROCEDURE — 85025 COMPLETE CBC W/AUTO DIFF WBC: CPT

## 2017-09-18 PROCEDURE — 36415 COLL VENOUS BLD VENIPUNCTURE: CPT

## 2017-09-18 PROCEDURE — 99284 EMERGENCY DEPT VISIT MOD MDM: CPT

## 2017-09-18 PROCEDURE — 82550 ASSAY OF CK (CPK): CPT

## 2017-09-18 PROCEDURE — 83690 ASSAY OF LIPASE: CPT

## 2017-09-18 PROCEDURE — 83735 ASSAY OF MAGNESIUM: CPT

## 2017-09-18 PROCEDURE — 80069 RENAL FUNCTION PANEL: CPT

## 2017-09-18 PROCEDURE — 96374 THER/PROPH/DIAG INJ IV PUSH: CPT

## 2017-09-18 PROCEDURE — 80053 COMPREHEN METABOLIC PANEL: CPT

## 2017-09-18 PROCEDURE — 80307 DRUG TEST PRSMV CHEM ANLYZR: CPT

## 2017-09-18 PROCEDURE — 82553 CREATINE MB FRACTION: CPT

## 2017-09-18 PROCEDURE — 81001 URINALYSIS AUTO W/SCOPE: CPT

## 2017-09-18 PROCEDURE — 84132 ASSAY OF SERUM POTASSIUM: CPT

## 2017-09-18 PROCEDURE — 81003 URINALYSIS AUTO W/O SCOPE: CPT

## 2017-09-18 PROCEDURE — 80306 DRUG TEST PRSMV INSTRMNT: CPT

## 2017-09-18 PROCEDURE — 80329 ANALGESICS NON-OPIOID 1 OR 2: CPT

## 2017-09-18 PROCEDURE — 87086 URINE CULTURE/COLONY COUNT: CPT

## 2017-09-18 SDOH — SOCIAL STABILITY - SOCIAL INSECURITY: PROBLEM RELATED TO PRIMARY SUPPORT GROUP, UNSPECIFIED: Z63.9

## 2017-09-18 NOTE — ED PHYSICIAN DOCUMENTATION
PD HPI MHE





- Stated complaint


Stated Complaint: MHE





- Chief complaint


Chief Complaint: MHE





- History obtained from


History obtained from: Patient, EMS





- History of Present Illness


Primary symptom: Aggressive behavior


Timing - onset: Today


Contributing factors: Family


Similar symptoms before: Has not had sx before





- Additional information


Additional information: 





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 

home today yelling at his parents in an argument with them and this was bad 

enough that they called 911 the police arrived and gave the patient option to 

go to [facility] or to the hospital.  The patient has chosen to come to the hospital.

  Today states that he has been compliant with his potassium regimen which he 

takes 4 times per day and he denies symptoms of weakness or muscle tension.  He 

has been admitted to the hospital between 1 and 4 times per month this calendar 

year.  He did not require admission in the month of August.  He denies suicidal 

ideation he denies homicidal ideation and states that he is mostly in an 

argument with his parents over him not been able to take care of himself 

outside of their home.





Review of Systems


Constitutional: denies: Fever


Eyes: denies: Decreased vision


Ears: denies: Ear pain


Nose: denies: Congestion


Throat: denies: Sore throat


Cardiac: denies: Chest pain / pressure, Palpitations


Respiratory: denies: Dyspnea, Cough


GI: denies: Abdominal Pain, Abdominal Swelling, Nausea, Vomiting


: denies: Dysuria, Frequency


Skin: denies: Rash


Musculoskeletal: denies: Neck pain, Back pain, Extremity pain


Neurologic: denies: Generalized weakness, Focal weakness, Numbness


Psychiatric: denies: Suicidal, Homicidal, Hallucinations, Delusions





PD PAST MEDICAL HISTORY





- Past Medical History


Past Medical History: Yes


Cardiovascular: None


Respiratory: None


Neuro: None


Endocrine/Autoimmune: None


GI: None


: None


HEENT: None


Psych: Depression, Anxiety


Musculoskeletal: Fatigue


Derm: None, Other





- Past Surgical History


Past Surgical History: Yes





- Present Medications


Home Medications: 


 Ambulatory Orders











 Medication  Instructions  Recorded  Confirmed


 


Trazodone HCl 50 mg PO QPM PRN 06/13/16 09/18/17


 


Risperidone [Risperdal] 2 mg PO QPM 30 Days 02/26/17 09/18/17


 


Buspirone HCl 5 mg PO BID 05/03/17 09/18/17


 


Citalopram Hydrobromide 20 mg PO BID 05/03/17 09/18/17





[Citalopram HBr]   


 


Lorazepam 0.5 mg PO TID PRN 05/03/17 09/18/17


 


Propranolol HCl 10 mg PO BID 05/03/17 09/18/17


 


HYDROcod/ACETAM 5/325 [Norco 5/325] 1 tab PO Q6HR PRN #12 tablet 06/18/17 09/18/ 17


 


Spironolactone 25 mg PO BID 30 Days 06/18/17 09/18/17


 


Potassium Citrate/Citric Acid 4 pkt PO Q6H 07/04/17 09/18/17





[Cytra-K Oral Solution]   














- Allergies


Allergies/Adverse Reactions: 


 Allergies











Allergy/AdvReac Type Severity Reaction Status Date / Time


 


No Known Drug Allergies Allergy   Verified 09/18/17 21:47














- Social History


Does the pt smoke?: Yes


Smoking Status: Never smoker


Does the pt drink ETOH?: No


Does the pt have substance abuse?: Yes





- Immunizations


Immunizations are current?: Yes





- POLST


Patient has POLST: No


POLST Status: Full Code





PD ED PE NORMAL





- Vitals


Vital signs reviewed: Yes (Hypertensive)





- General


General: No acute distress, Well developed/nourished, Other (Patient is calm 

cooperative and interactive.)





- HEENT


HEENT: Atraumatic, PERRL, EOMI





- Neck


Neck: Supple, no meningeal sign





- Cardiac


Cardiac: RRR, No murmur





- Respiratory


Respiratory: No respiratory distress, Clear bilaterally





- Abdomen


Abdomen: Soft, Non tender





- Back


Back: No CVA TTP, No spinal TTP





- Derm


Derm: Normal color, Warm and dry, No rash





- Extremities


Extremities: No deformity, Normal ROM s pain





- Neuro


Neuro: No motor deficit, No sensory deficit





- Psych


Psych: Normal mood, Normal affect





Results





- Vitals


Vitals: 





 Vital Signs - 24 hr











  09/18/17 09/18/17





  21:45 23:12


 


Temperature 36.6 C 


 


Heart Rate 87 85


 


Respiratory 16 16





Rate  


 


Blood Pressure 139/84 H 116/69


 


O2 Saturation 99 97








 Oxygen











O2 Source                      Room air

















- Labs


Labs: 





 Laboratory Tests











  09/18/17 09/18/17 09/18/17





  22:11 22:11 22:11


 


WBC  16.0 H  


 


RBC  6.21 H  


 


Hgb  17.5  


 


Hct  51.3  


 


MCV  82.7  


 


MCH  28.2  


 


MCHC  34.1  


 


RDW  14.8  


 


Plt Count  368  


 


MPV  7.0 L  


 


Neut #  11.9 H  


 


Lymph #  2.6  


 


Mono #  1.1 H  


 


Eos #  0.3  


 


Baso #  0.1  


 


Absolute Nucleated RBC  0.01  


 


Nucleated RBCs  0.1  


 


Sodium   133 L 


 


Potassium   1.5 L* 


 


Chloride   97 L 


 


Carbon Dioxide   23 


 


Anion Gap   13.0 


 


BUN   14 


 


Creatinine   1.0 


 


Estimated GFR (MDRD)   88 L 


 


Glucose   107 H 


 


Calcium   9.4 


 


Total Bilirubin   0.7 


 


AST   22 


 


ALT   32 


 


Alkaline Phosphatase   82 


 


Total Creatine Kinase   84 


 


CK-MB (CK-2)    3.1


 


Total Protein   8.0 


 


Albumin   4.1 


 


Globulin   3.9 


 


Albumin/Globulin Ratio   1.1 


 


Lipase   64 H 


 


Urine Color   


 


Urine Clarity   


 


Urine pH   


 


Ur Specific Gravity   


 


Urine Protein   


 


Urine Glucose (UA)   


 


Urine Ketones   


 


Urine Occult Blood   


 


Urine Nitrite   


 


Urine Bilirubin   


 


Urine Urobilinogen   


 


Ur Leukocyte Esterase   


 


Ur Microscopic Review   


 


Urine Culture Comments   


 


Salicylates   < 6.0 


 


Urine Opiates Screen   


 


Ur Oxycodone Screen   


 


Urine Methadone Screen   


 


Ur Propoxyphene Screen   


 


Acetaminophen   < 10 L 


 


Ur Barbiturates Screen   


 


Ur Tricyclics Screen   


 


Ur Phencyclidine Scrn   


 


Ur Amphetamine Screen   


 


U Methamphetamines Scrn   


 


U Benzodiazepines Scrn   


 


Urine Cocaine Screen   


 


U Cannabinoids Screen   


 


Ethyl Alcohol   < 5.0 














  09/18/17





  22:30


 


WBC 


 


RBC 


 


Hgb 


 


Hct 


 


MCV 


 


MCH 


 


MCHC 


 


RDW 


 


Plt Count 


 


MPV 


 


Neut # 


 


Lymph # 


 


Mono # 


 


Eos # 


 


Baso # 


 


Absolute Nucleated RBC 


 


Nucleated RBCs 


 


Sodium 


 


Potassium 


 


Chloride 


 


Carbon Dioxide 


 


Anion Gap 


 


BUN 


 


Creatinine 


 


Estimated GFR (MDRD) 


 


Glucose 


 


Calcium 


 


Total Bilirubin 


 


AST 


 


ALT 


 


Alkaline Phosphatase 


 


Total Creatine Kinase 


 


CK-MB (CK-2) 


 


Total Protein 


 


Albumin 


 


Globulin 


 


Albumin/Globulin Ratio 


 


Lipase 


 


Urine Color  YELLOW


 


Urine Clarity  CLEAR


 


Urine pH  8.0 H


 


Ur Specific Gravity  1.015


 


Urine Protein  TRACE


 


Urine Glucose (UA)  NEGATIVE


 


Urine Ketones  NEGATIVE


 


Urine Occult Blood  NEGATIVE


 


Urine Nitrite  NEGATIVE


 


Urine Bilirubin  NEGATIVE


 


Urine Urobilinogen  0.2 (NORMAL)


 


Ur Leukocyte Esterase  NEGATIVE


 


Ur Microscopic Review  NOT INDICATED


 


Urine Culture Comments  NOT INDICATED


 


Salicylates 


 


Urine Opiates Screen  NEGATIVE


 


Ur Oxycodone Screen  NEGATIVE


 


Urine Methadone Screen  NEGATIVE


 


Ur Propoxyphene Screen  NEGATIVE


 


Acetaminophen 


 


Ur Barbiturates Screen  NEGATIVE


 


Ur Tricyclics Screen  NEGATIVE


 


Ur Phencyclidine Scrn  NEGATIVE


 


Ur Amphetamine Screen  NEGATIVE


 


U Methamphetamines Scrn  NEGATIVE


 


U Benzodiazepines Scrn  NEGATIVE


 


Urine Cocaine Screen  NEGATIVE


 


U Cannabinoids Screen  POSITIVE H


 


Ethyl Alcohol 














PD MEDICAL DECISION MAKING





- ED course


Complexity details: reviewed old records, reviewed results, re-evaluated patient

, considered differential, d/w patient


ED course: 





27-year-old male with Bartter syndrome has come to the emergency department 

with a chief complaint of aggressive behavior and is found to have a potassium 

of 1.5.  This is a common finding for this patient and he has required 

hospitalization numerous times.  Despite the patient indicating that he has 

been compliant with his potassium regimen I suspect this is not true.  Here in 

the emergency department he is administered potassium bicarbonate orally 25 mEq 

and 20 mEq of IV potassium.  He will need admission to the hospital.





Departure





- Departure


Disposition: 66 [facility] DC/Xfer


Clinical Impression: 


 Hypokalemia


Condition: Serious

## 2017-09-19 VITALS — SYSTOLIC BLOOD PRESSURE: 111 MMHG | DIASTOLIC BLOOD PRESSURE: 55 MMHG

## 2017-09-19 LAB
ANION GAP SERPL CALCULATED.4IONS-SCNC: 13 MMOL/L (ref 6–13)
BUN SERPL-MCNC: 14 MG/DL (ref 6–20)
CALCIUM UR-MCNC: 8.9 MG/DL (ref 8.5–10.3)
CHLORIDE SERPL-SCNC: 104 MMOL/L (ref 101–111)
CO2 SERPL-SCNC: 19 MMOL/L (ref 21–32)
CREAT SERPLBLD-SCNC: 1 MG/DL (ref 0.6–1.2)
ERYTHROCYTE [DISTWIDTH] IN BLOOD BY AUTOMATED COUNT: 14.4 % (ref 12–15)
GFRSERPLBLD MDRD-ARVRAT: 88 ML/MIN/{1.73_M2} (ref 89–?)
GLUCOSE SERPL-MCNC: 116 MG/DL (ref 70–100)
HCT VFR BLD AUTO: 49 % (ref 42–52)
HGB UR QL STRIP: 16.7 G/DL (ref 14–18)
MAGNESIUM SERPL-MCNC: 2 MG/DL (ref 1.7–2.8)
MCH RBC QN AUTO: 28.2 PG (ref 27–31)
MCHC RBC AUTO-ENTMCNC: 34.1 G/DL (ref 32–36)
MCV RBC AUTO: 82.7 FL (ref 80–94)
NEUTROPHILS # SNV AUTO: 13.8 X10^3/UL (ref 4.8–10.8)
PDW BLD AUTO: 7.3 FL (ref 7.4–11.4)
PHOSPHATE BLD-MCNC: 2.1 MG/DL (ref 2.5–4.6)
POTASSIUM SERPL-SCNC: 2.3 MMOL/L (ref 3.5–5)
POTASSIUM SERPL-SCNC: 2.3 MMOL/L (ref 3.5–5)
RBC MAR: 5.92 10^6/UL (ref 4.7–6.1)
SODIUM SERPLBLD-SCNC: 136 MMOL/L (ref 135–145)

## 2017-09-19 RX ADMIN — POTASSIUM CHLORIDE SCH MLS/HR: 7.46 INJECTION, SOLUTION INTRAVENOUS at 05:41

## 2017-09-19 RX ADMIN — POTASSIUM CHLORIDE SCH MLS/HR: 7.46 INJECTION, SOLUTION INTRAVENOUS at 07:17

## 2017-09-19 RX ADMIN — POTASSIUM CHLORIDE STA MEQ: 1500 TABLET, EXTENDED RELEASE ORAL at 02:19

## 2017-09-19 RX ADMIN — POTASSIUM CHLORIDE STA: 1500 TABLET, EXTENDED RELEASE ORAL at 02:34

## 2017-09-19 NOTE — DISCHARGE SUMMARY
DATE OF ADMISSION: 09/19/2017

 

DATE OF DISCHARGE: 09/19/2017

 

DISCHARGE DIAGNOSES:

1. Severe hypokalemia.

2. Leukocytosis, reactive.

3. Cannabinoids dependent.

4. Personal history of noncompliance with medications.

5. Mental illness.

 

DISCHARGE MEDICATIONS: Resumed per instruction. The patient has not had any changes in his medication
s with this admission and he is supposed to resume his:

1. Trazodone.

2. Spironolactone.

3. Risperdal.

4. Propranolol.

5. Potassium citrate.

6. Lorazepam.

7. Norco.

8. Citalopram.

9. Buspirone.

 

HOSPITAL COURSE: This is one of many admissions for hypokalemia. [facility] and has multiple, multiple admissions for his renal tubular acidosis and hypokalemi
a. He was homeless and living in a van with his parents. By his history they now have an apartment, a
nd his mom has been angry with him and wants to throw him out of the apartment. He disagreed. Police 
were called. He was given a choice of either going to the hospital or going to [facility]. He chose to come
 to the hospital. He was found to be again, hypokalemic. Potassium was 1.5. He was admitted to treat 
his hypokalemia, but states probably about 9-10 hours and decided that he needed to leave AMA. His mo
ther was again concerned and stated that he is not in his right mind, and that we need to put him in 
an inpatient psych unit and be evaluated for psychiatric illness. We asked him if he wanted to kill h
imself and he said no. I asked him if he wanted to kill anyone else including his mother or wanted to
 harm his mother anyone else, and he said no. All he wanted was out of here. He rolled his eyes when 
we discussed that hypokalemia can lead to arrhythmia and sudden cardiac death. He said he has heard a
ll of this before and this is not going to change his mind. He really did not give a good reason why 
he did not want to stay this time.

 

As such he was discharged against medical advice. He is to resume all his usual medications.

 

 

 

DD:09/19/2017 19:05:00  DT: 09/19/2017 20:32  JOB #: [id number]  EXT JOB #:[id number]

## 2017-09-23 ENCOUNTER — HOSPITAL ENCOUNTER (EMERGENCY)
Dept: HOSPITAL 76 - ED | Age: 27
Discharge: HOME | End: 2017-09-23
Payer: MEDICAID

## 2017-09-23 VITALS — DIASTOLIC BLOOD PRESSURE: 95 MMHG | SYSTOLIC BLOOD PRESSURE: 123 MMHG

## 2017-09-23 DIAGNOSIS — R03.0: ICD-10-CM

## 2017-09-23 DIAGNOSIS — E87.6: Primary | ICD-10-CM

## 2017-09-23 DIAGNOSIS — Z87.891: ICD-10-CM

## 2017-09-23 PROCEDURE — 99283 EMERGENCY DEPT VISIT LOW MDM: CPT

## 2017-09-23 NOTE — ED PHYSICIAN DOCUMENTATION
History of Present Illness





- Stated complaint


Stated Complaint: CLEARANCE





- Chief complaint


Chief Complaint: General





- History obtained from


History obtained from: Patient, Police





- History of Present Illness


Timing: Other (Brought in by 's deputy for fit for confinement release, 

he has no acute complaints but admits to only taking one dose of his potassium 

supplementation today.)





Review of Systems


Constitutional: reports: Reviewed and negative


Nose: reports: Reviewed and negative


Throat: reports: Reviewed and negative


Cardiac: reports: Reviewed and negative





PD PAST MEDICAL HISTORY





- Past Medical History


Cardiovascular: None


Respiratory: None


Neuro: None


Endocrine/Autoimmune: None


GI: None


: None


HEENT: None


Psych: Depression, Anxiety


Musculoskeletal: Fatigue


Derm: None, Other





- Past Surgical History


Past Surgical History: Yes





- Present Medications


Home Medications: 


 Ambulatory Orders











 Medication  Instructions  Recorded  Confirmed


 


Trazodone HCl 50 mg PO QPM PRN 06/13/16 09/18/17


 


Risperidone [Risperdal] 2 mg PO QPM 30 Days 02/26/17 09/18/17


 


Buspirone HCl 5 mg PO BID 05/03/17 09/18/17


 


Citalopram Hydrobromide 20 mg PO BID 05/03/17 09/18/17





[Citalopram HBr]   


 


Lorazepam 0.5 mg PO TID PRN 05/03/17 09/18/17


 


Propranolol HCl 10 mg PO BID 05/03/17 09/18/17


 


HYDROcod/ACETAM 5/325 [Norco 5/325] 1 tab PO Q6HR PRN #12 tablet 06/18/17 09/18/ 17


 


Spironolactone 25 mg PO BID 30 Days 06/18/17 09/18/17


 


Potassium Citrate/Citric Acid 4 pkt PO Q6H 07/04/17 09/18/17





[Cytra-K Oral Solution]   














- Allergies


Allergies/Adverse Reactions: 


 Allergies











Allergy/AdvReac Type Severity Reaction Status Date / Time


 


No Known Drug Allergies Allergy   Verified 09/23/17 19:15














- Social History


Does the pt smoke?: Yes


Smoking Status: Former smoker


Does the pt drink ETOH?: No


Does the pt have substance abuse?: Yes





- Immunizations


Immunizations are current?: Yes





- POLST


Patient has POLST: No


POLST Status: Full Code





PD ED PE NORMAL





- Vitals


Vital signs reviewed: Yes





- General


General: Alert and oriented X 3, No acute distress





- Cardiac


Cardiac: RRR, No murmur





- Respiratory


Respiratory: No respiratory distress, Clear bilaterally





- Abdomen


Abdomen: Non tender





- Neuro


Neuro: Alert and oriented X 3, Normal speech





- Psych


Psych: Normal mood, Normal affect





Results





- Vitals


Vitals: 


 Vital Signs - 24 hr











  09/23/17 09/23/17





  19:16 19:50


 


Temperature 3.0 C L 36.8 C


 


Heart Rate 100 98


 


Respiratory 16 16





Rate  


 


Blood Pressure 120/91 H 123/95 H


 


O2 Saturation 97 98








 Oxygen











O2 Source                      Room air

















PD MEDICAL DECISION MAKING





- ED course


ED course: 





With the patient's verbal permission I did discuss frankly with the [facility]'s 

deputy that if we checked labs which would be necessary for a fit for 

confinement release, the patient would likely be profoundly hypokalemic as he 

usually is and I would not be able to in any likelihood clear him for jailing.  

At that point the [facility]'s deputy consulted with his superiors and the patient 

was released from his arrest.  Patient says he does not feel any worse than 

normal but admits to missing potassium doses today and he was given oral 

potassium and refused labs.





Departure





- Departure


Disposition: 01 Home, Self Care


Clinical Impression: 


 Hypokalemia


Condition: Good


Record reviewed to determine appropriate education?: Yes


Comments: 


Take your potassium supplements as you are supposed to, every day, 4 times a 

day.





Your blood pressure was elevated today on check into the emergency department.  

This does not mean that you have hypertension, it is a common phenomenon to 

come to the emergency department and have elevated blood pressure.  I recommend 

that she see your primary care physician within the week to have it rechecked 

when you are feeling better.





Do not drink or drive while taking narcotic pain medication.


Note that many narcotic pain relievers also contain Tylenol/acetaminophen.  

Please ensure that your total dose of acetaminophen from all sources does not 

exceed 3 g (3000 mg) per day.


You may get constipated while on this medication.  Take a stool softener such 

as Colace twice a day while you are on it.  Also add an over-the-counter 

laxative such as senna or MiraLAX on any day that you do not have a bowel 

movement.


If you received a narcotic pain medication or sedative while in the emergency 

department, do not drive for the next 24 hours.


Discharge Date/Time: 09/23/17 19:55

## 2017-10-10 ENCOUNTER — HOSPITAL ENCOUNTER (INPATIENT)
Dept: HOSPITAL 76 - ED | Age: 27
LOS: 1 days | Discharge: LEFT BEFORE DISCHARGE | DRG: 645 | End: 2017-10-11
Attending: INTERNAL MEDICINE | Admitting: INTERNAL MEDICINE
Payer: MEDICAID

## 2017-10-10 ENCOUNTER — HOSPITAL ENCOUNTER (OUTPATIENT)
Dept: HOSPITAL 76 - EMS | Age: 27
Discharge: ANOTHER HOSPITAL | End: 2017-10-10
Attending: SURGERY
Payer: MEDICAID

## 2017-10-10 DIAGNOSIS — F32.9: ICD-10-CM

## 2017-10-10 DIAGNOSIS — N25.89: ICD-10-CM

## 2017-10-10 DIAGNOSIS — F41.9: ICD-10-CM

## 2017-10-10 DIAGNOSIS — E26.81: Primary | ICD-10-CM

## 2017-10-10 DIAGNOSIS — E83.39: ICD-10-CM

## 2017-10-10 DIAGNOSIS — R52: Primary | ICD-10-CM

## 2017-10-10 DIAGNOSIS — F17.210: ICD-10-CM

## 2017-10-10 DIAGNOSIS — D72.829: ICD-10-CM

## 2017-10-10 DIAGNOSIS — Z91.14: ICD-10-CM

## 2017-10-10 DIAGNOSIS — R53.1: ICD-10-CM

## 2017-10-10 DIAGNOSIS — T50.3X6A: ICD-10-CM

## 2017-10-10 LAB
ALBUMIN/GLOB SERPL: 1 {RATIO} (ref 1–2.2)
ANION GAP SERPL CALCULATED.4IONS-SCNC: 15 MMOL/L (ref 6–13)
BASOPHILS NFR BLD AUTO: 0.1 10^3/UL (ref 0–0.1)
BASOPHILS NFR BLD AUTO: 0.3 %
BILIRUB BLD-MCNC: 0.9 MG/DL (ref 0.2–1)
BUN SERPL-MCNC: 19 MG/DL (ref 6–20)
CALCIUM UR-MCNC: 9.7 MG/DL (ref 8.5–10.3)
CHLORIDE SERPL-SCNC: 98 MMOL/L (ref 101–111)
CO2 SERPL-SCNC: 19 MMOL/L (ref 21–32)
CREAT SERPLBLD-SCNC: 1.2 MG/DL (ref 0.6–1.2)
EOSINOPHIL # BLD AUTO: 0.3 10^3/UL (ref 0–0.7)
EOSINOPHIL NFR BLD AUTO: 1 %
ERYTHROCYTE [DISTWIDTH] IN BLOOD BY AUTOMATED COUNT: 14.2 % (ref 12–15)
GFRSERPLBLD MDRD-ARVRAT: 72 ML/MIN/{1.73_M2} (ref 89–?)
GLOBULIN SER-MCNC: 4.5 G/DL (ref 2.1–4.2)
GLUCOSE SERPL-MCNC: 129 MG/DL (ref 70–100)
HCT VFR BLD AUTO: 59.1 % (ref 42–52)
HGB UR QL STRIP: 20.2 G/DL (ref 14–18)
LIPASE SERPL-CCNC: 85 U/L (ref 22–51)
LYMPHOCYTES # SPEC AUTO: 3.1 10^3/UL (ref 1.5–3.5)
LYMPHOCYTES NFR BLD AUTO: 10.8 %
MAGNESIUM SERPL-MCNC: 2.3 MG/DL (ref 1.7–2.8)
MCH RBC QN AUTO: 27.5 PG (ref 27–31)
MCHC RBC AUTO-ENTMCNC: 34.2 G/DL (ref 32–36)
MCV RBC AUTO: 80.4 FL (ref 80–94)
MONOCYTES # BLD AUTO: 1.9 10^3/UL (ref 0–1)
MONOCYTES NFR BLD AUTO: 6.6 %
NEUTROPHILS # BLD AUTO: 23.4 10^3/UL (ref 1.5–6.6)
NEUTROPHILS # SNV AUTO: 28.8 X10^3/UL (ref 4.8–10.8)
NEUTROPHILS NFR BLD AUTO: 81.3 %
NRBC # BLD AUTO: 0.5 /100WBC
PDW BLD AUTO: 7.3 FL (ref 7.4–11.4)
PHOSPHATE BLD-MCNC: 1.4 MG/DL (ref 2.5–4.6)
PLAT MORPH BLD: (no result)
PLATELET BLD QL SMEAR: (no result)
POTASSIUM SERPL-SCNC: < 1.5 MMOL/L (ref 3.5–5)
PROT SPEC-MCNC: 9.1 G/DL (ref 6.7–8.2)
RBC MAR: 7.36 10^6/UL (ref 4.7–6.1)
SODIUM SERPLBLD-SCNC: 132 MMOL/L (ref 135–145)
WBC # BLD: 28.8 X10^3/UL
WBC MORPH BLD: (no result)

## 2017-10-10 PROCEDURE — 99284 EMERGENCY DEPT VISIT MOD MDM: CPT

## 2017-10-10 PROCEDURE — 36415 COLL VENOUS BLD VENIPUNCTURE: CPT

## 2017-10-10 PROCEDURE — 80048 BASIC METABOLIC PNL TOTAL CA: CPT

## 2017-10-10 PROCEDURE — 83690 ASSAY OF LIPASE: CPT

## 2017-10-10 PROCEDURE — 96375 TX/PRO/DX INJ NEW DRUG ADDON: CPT

## 2017-10-10 PROCEDURE — 87150 DNA/RNA AMPLIFIED PROBE: CPT

## 2017-10-10 PROCEDURE — 84132 ASSAY OF SERUM POTASSIUM: CPT

## 2017-10-10 PROCEDURE — 85025 COMPLETE CBC W/AUTO DIFF WBC: CPT

## 2017-10-10 PROCEDURE — 96366 THER/PROPH/DIAG IV INF ADDON: CPT

## 2017-10-10 PROCEDURE — 96365 THER/PROPH/DIAG IV INF INIT: CPT

## 2017-10-10 PROCEDURE — 93005 ELECTROCARDIOGRAM TRACING: CPT

## 2017-10-10 PROCEDURE — 99291 CRITICAL CARE FIRST HOUR: CPT

## 2017-10-10 PROCEDURE — 83735 ASSAY OF MAGNESIUM: CPT

## 2017-10-10 PROCEDURE — 80053 COMPREHEN METABOLIC PANEL: CPT

## 2017-10-10 PROCEDURE — 84100 ASSAY OF PHOSPHORUS: CPT

## 2017-10-10 RX ADMIN — SPIRONOLACTONE SCH MG: 25 TABLET, FILM COATED ORAL at 23:22

## 2017-10-10 RX ADMIN — PROPRANOLOL HYDROCHLORIDE SCH MG: 10 TABLET ORAL at 23:22

## 2017-10-10 NOTE — ED PHYSICIAN DOCUMENTATION
History of Present Illness





- Stated complaint


Stated Complaint: JOINT STIFFNESS





- Chief complaint


Chief Complaint: General





- History obtained from


History obtained from: Patient





- History of Present Illness


Timing: Other (27-year-old gentleman with chronic renal tubular acidosis which 

is potassium wasting and issues with medical noncompliance presents with acute 

generalized weakness and muscle soreness all over.  He has missed several doses 

of his potassium repletion.)





Review of Systems


Ten Systems: 10 systems reviewed and negative


Constitutional: reports: Fatigue.  denies: Fever, Chills


Nose: denies: Rhinorrhea / runny nose, Congestion


Cardiac: reports: Reviewed and negative


Respiratory: reports: Reviewed and negative





PD PAST MEDICAL HISTORY





- Past Medical History


Cardiovascular: None


Respiratory: None


Neuro: None


Endocrine/Autoimmune: None


GI: None


: None


HEENT: None


Psych: Depression, Anxiety


Musculoskeletal: Fatigue


Derm: None, Other





- Past Surgical History


Past Surgical History: Yes





- Present Medications


Home Medications: 


 Ambulatory Orders











 Medication  Instructions  Recorded  Confirmed


 


Trazodone HCl 50 mg PO QPM PRN 06/13/16 10/10/17


 


Risperidone [Risperdal] 2 mg PO QPM 30 Days  tablet 02/26/17 10/10/17


 


Buspirone HCl 5 mg PO BID 05/03/17 10/10/17


 


Lorazepam 0.5 mg PO TID PRN 05/03/17 10/10/17


 


Propranolol HCl 10 mg PO BID 05/03/17 10/10/17


 


HYDROcod/ACETAM 5/325 [Norco 5/325] 1 tab PO Q6HR PRN #12 tablet 06/18/17 10/10/

17


 


Spironolactone 25 mg PO BID 30 Days  tablet 06/18/17 10/10/17


 


Potassium Chloride [Klor-Con M20] 6 tab PO QID 10/10/17 10/10/17














- Allergies


Allergies/Adverse Reactions: 


 Allergies











Allergy/AdvReac Type Severity Reaction Status Date / Time


 


No Known Drug Allergies Allergy   Verified 09/23/17 19:15














- Social History


Does the pt smoke?: Yes


Smoking Status: Former smoker


Does the pt drink ETOH?: No


Does the pt have substance abuse?: Yes





- Family History


Family history: reports: Non contributory





- Immunizations


Immunizations are current?: Yes





- POLST


Patient has POLST: No


POLST Status: Full Code





PD ED PE NORMAL





- Vitals


Vital signs reviewed: Yes





- General


General: Alert and oriented X 3, No acute distress





- HEENT


HEENT: PERRL, EOMI





- Neck


Neck: Supple, no meningeal sign, No bony TTP





- Cardiac


Cardiac: Other (Frequent extrasystoles)





- Respiratory


Respiratory: No respiratory distress, Clear bilaterally





- Abdomen


Abdomen: Soft, Non tender





- Derm


Derm: Normal color, Warm and dry





- Extremities


Extremities: No edema, No calf tenderness / cord





- Neuro


Neuro: Alert and oriented X 3, Normal speech





- Psych


Psych: Other (Depressed affect, normal mood.)





Results





- Vitals


Vitals: 


 Vital Signs - 24 hr











  10/10/17 10/10/17





  20:02 21:59


 


Temperature 36.6 C 


 


Heart Rate 92 98


 


Respiratory 16 18





Rate  


 


Blood Pressure 142/75 H 


 


O2 Saturation 98 97








 Oxygen











O2 Source                      Room air

















- EKG (time done)


  ** 2018


Rate: Rate (enter#) (94)


Rhythm: Other (A. fib but also with some sinus beats,)


Axis: Normal


Intervals: Normal AL


Ischemia: Non specific changes (Diffuse ST depression)


Computer interpretation: Agree with computer





- Labs


Labs: 


 Laboratory Tests











  10/10/17 10/10/17





  21:05 21:05


 


WBC  28.8 H 


 


RBC  7.36 H 


 


Hgb  20.2 H 


 


Hct  59.1 H 


 


MCV  80.4 


 


MCH  27.5 


 


MCHC  34.2 


 


RDW  14.2 


 


Plt Count  432 


 


MPV  7.3 L 


 


Neut #  23.4 H 


 


Lymph #  3.1 


 


Mono #  1.9 H 


 


Eos #  0.3 


 


Baso #  0.1 


 


Absolute Nucleated RBC  0.15 


 


Nucleated RBC %  0.5 


 


Manual Slide Review  Indicated 


 


WBC Morphology  1+ REACTIVE LYMPHS 


 


Platelet Estimate  NORMAL (130-450,000) 


 


Platelet Morphology  NORMAL APPEARANCE 


 


RBC Morph Micro Appear  NORMAL APPEARANCE 


 


Sodium   132 L


 


Potassium   < 1.5 L*


 


Chloride   98 L


 


Carbon Dioxide   19 L


 


Anion Gap   15.0 H


 


BUN   19


 


Creatinine   1.2


 


Estimated GFR (MDRD)   72 L


 


Glucose   129 H


 


Calcium   9.7


 


Phosphorus   1.4 L


 


Magnesium   2.3


 


Total Bilirubin   0.9


 


AST   28


 


ALT   50


 


Alkaline Phosphatase   109


 


Total Protein   9.1 H


 


Albumin   4.6


 


Globulin   4.5 H


 


Albumin/Globulin Ratio   1.0


 


Lipase   85 H














Procedures





- General procedure


General procedure: 





He was difficult for IV access, he came with a prehospital IV that was in his 

thumb, quite tenuous, especially for potassium.  I personally placed a long 20-

gauge IV in the right antecubital fossa using real-time ultrasound guidance 

which flushed and karrie easily.





PD MEDICAL DECISION MAKING





- ED course


ED course: 





27-year-old gentleman with recurrent severe hypokalemia due to renal tubular 

acidosis with potassium wasting and medical noncompliance.  He was given both 

oral and IV potassium here and I spoke with the hospitalist, Dr. Gonzalez for 

admission at 10:12 PM.





- Critical Care


Time(min): 38


Time Includes: Direct patient care, Review records, Reassess patient, Document 

care, Coordinate care, Medical consult


Data interpretation: Labs, Pulse ox


Procedures included in critical care time: Peripheral IV


Procedures excluded from critical care time: EKG





Departure





- Departure


Disposition: 66 [facility] DC/Xfer


Clinical Impression: 


 Hypokalemia, Hypophosphatemia





Condition: Stable

## 2017-10-11 VITALS — SYSTOLIC BLOOD PRESSURE: 116 MMHG | DIASTOLIC BLOOD PRESSURE: 67 MMHG

## 2017-10-11 LAB
ANION GAP SERPL CALCULATED.4IONS-SCNC: 12 MMOL/L (ref 6–13)
BASOPHILS NFR BLD AUTO: 0 10^3/UL (ref 0–0.1)
BASOPHILS NFR BLD AUTO: 0.1 %
BUN SERPL-MCNC: 18 MG/DL (ref 6–20)
CALCIUM UR-MCNC: 8.2 MG/DL (ref 8.5–10.3)
CHLORIDE SERPL-SCNC: 99 MMOL/L (ref 101–111)
CO2 SERPL-SCNC: 20 MMOL/L (ref 21–32)
CREAT SERPLBLD-SCNC: 1.1 MG/DL (ref 0.6–1.2)
EOSINOPHIL # BLD AUTO: 0.3 10^3/UL (ref 0–0.7)
EOSINOPHIL NFR BLD AUTO: 1.2 %
ERYTHROCYTE [DISTWIDTH] IN BLOOD BY AUTOMATED COUNT: 14.5 % (ref 12–15)
GFRSERPLBLD MDRD-ARVRAT: 79 ML/MIN/{1.73_M2} (ref 89–?)
GLUCOSE SERPL-MCNC: 115 MG/DL (ref 70–100)
HCT VFR BLD AUTO: 51.7 % (ref 42–52)
HGB UR QL STRIP: 17.6 G/DL (ref 14–18)
LYMPHOCYTES # SPEC AUTO: 2.7 10^3/UL (ref 1.5–3.5)
LYMPHOCYTES NFR BLD AUTO: 11.4 %
MAGNESIUM SERPL-MCNC: 2.4 MG/DL (ref 1.7–2.8)
MCH RBC QN AUTO: 27.6 PG (ref 27–31)
MCHC RBC AUTO-ENTMCNC: 34 G/DL (ref 32–36)
MCV RBC AUTO: 81.1 FL (ref 80–94)
MONOCYTES # BLD AUTO: 1.6 10^3/UL (ref 0–1)
MONOCYTES NFR BLD AUTO: 6.8 %
NEUTROPHILS # BLD AUTO: 18.7 10^3/UL (ref 1.5–6.6)
NEUTROPHILS # SNV AUTO: 23.3 X10^3/UL (ref 4.8–10.8)
NEUTROPHILS NFR BLD AUTO: 80.5 %
NP AUTO DIFFERENTIAL?: NO
NP MAN DIFFERENTIAL?: YES
NRBC # BLD AUTO: 0.1 /100WBC
PDW BLD AUTO: 7.2 FL (ref 7.4–11.4)
PLAT MORPH BLD: (no result)
PLATELET BLD QL SMEAR: (no result)
POTASSIUM SERPL-SCNC: < 1.5 MMOL/L (ref 3.5–5)
RBC MAR: 6.38 10^6/UL (ref 4.7–6.1)
SODIUM SERPLBLD-SCNC: 131 MMOL/L (ref 135–145)
WBC # BLD: 23.3 X10^3/UL

## 2017-10-11 RX ADMIN — SODIUM CHLORIDE SCH: 9 INJECTION, SOLUTION INTRAVENOUS at 01:30

## 2017-10-11 RX ADMIN — SODIUM CHLORIDE SCH MLS/HR: 9 INJECTION, SOLUTION INTRAVENOUS at 06:28

## 2017-10-11 RX ADMIN — SPIRONOLACTONE SCH MG: 25 TABLET, FILM COATED ORAL at 09:34

## 2017-10-11 RX ADMIN — SODIUM CHLORIDE SCH MLS/HR: 9 INJECTION, SOLUTION INTRAVENOUS at 08:03

## 2017-10-11 RX ADMIN — PROPRANOLOL HYDROCHLORIDE SCH: 10 TABLET ORAL at 09:50

## 2017-10-11 NOTE — DISCHARGE SUMMARY
Discharge Summary


Admit Date: 10/10/17


Discharge Date: 10/11/17


Discharging Provider: Dr. Honey Guerrero


Primary Care Provider: JOSSUE Mandel


Code Status: Attempt Resuscitation


Condition at Discharge: Serious


Discharge Disposition: 07 Against Medical Advice





- DIAGNOSES


Admission Diagnoses: 





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


Psychiatric Disorder


Noncompliance


Leukocytosis


Discharge Diagnoses with Status of Each Condition: 


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 

resuscitation (he recived 120 mEq of KCl today before leaving AMA)


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


Psychiatric Disorder-unchanged


Noncompliance-unchanged


Leukocytosis-present





- HPI


History of Present Illness: 


The patient was admitted with hypokalemia and placed in the ICU on telemetry 

and KCl was ordered. Leukocytosis. Volume was replaced. He left AMA.








- HOSPITAL COURSE


Hospital Course: 


The patient was admitted with hypokalemia and placed in the ICU on telemetry 

and KCl was ordered. Volume was replaced. He had leukocytosis. He left AMA.








- ALLERGIES


Allergies/Adverse Reactions: 


 Allergies











Allergy/AdvReac Type Severity Reaction Status Date / Time


 


No Known Drug Allergies Allergy   Verified 09/23/17 19:15














- MEDICATIONS


Home Medications: 


 Ambulatory Orders











 Medication  Instructions  Recorded  Confirmed


 


Trazodone HCl 50 mg PO QPM PRN 06/13/16 10/10/17


 


Risperidone [Risperdal] 2 mg PO QPM 30 Days  tablet 02/26/17 10/10/17


 


Buspirone HCl 5 mg PO BID 05/03/17 10/10/17


 


Lorazepam 0.5 mg PO TID PRN 05/03/17 10/10/17


 


Propranolol HCl 10 mg PO BID 05/03/17 10/10/17


 


HYDROcod/ACETAM 5/325 [Norco 5/325] 1 tab PO Q6HR PRN #12 tablet 06/18/17 10/10/

17


 


Spironolactone 25 mg PO BID 30 Days  tablet 06/18/17 10/10/17


 


Potassium Chloride [Klor-Con M20] 6 tab PO QID 10/10/17 10/10/17


 


Potassium Chloride [K-Dur] 120 meq PO QID #720 tablet 10/11/17 














- PHYSICAL EXAM AT DISCHARGE


General Appearance: positive: No acute distress, Alert


Eyes Bilateral: positive: Normal inspection, PERRL


Neck: positive: Nml inspection


Respiratory: positive: No respiratory distress


Cardiovascular: positive: Regular rate & rhythm


Peripheral Pulses: positive: 2+


Abdomen: positive: Non-tender, Nml bowel sounds, No distention


Skin: positive: Color nml


Extremities: positive: Full ROM, No pedal edema


Neurologic/Psychiatric: positive: Oriented x3





- LABS


Result Diagrams: 


 10/11/17 06:17





 10/11/17 06:17





- FOLLOW UP


Follow Up: 


unknown-the patient did not share his plans or accept plans.








- TIME SPENT


Time Spent in Discharge (Minutes): 60

## 2017-10-11 NOTE — HISTORY & PHYSICAL EXAMINATION
DATE OF ADMISSION: 10/10/2017

 

HISTORY OF PRESENT ILLNESS: This is a 27-year-old white male with a history of 
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 
disorder. He has had multiple admissions for hypokalemia requiring IV and p.o. 
replacement and he has also had many of these admissions where he signs out 
against medical advice once he feels better.

 

The patient presented with similar complaints as in the past of feeling 
extremely weak. He did admit that he did not take about 3 doses of his usual 
q.i.d. potassium replacement. He denies any other complaints such as fever, 
cough, diarrhea, rash, shortness of breath. It is unclear if he was taking his 
other prescription medications as well.

 

REVIEW OF SYSTEMS: A comprehensive review of systems was performed and the 
pertinent positives and negatives were stated in the HPI and the remainder of 
the rest is negative.

 

FAMILY HISTORY: No history of RTA in other family members..

 

SOCIAL HISTORY: The patient lives with his parent(s). He was homeless for a 
long time, but recently the mother did obtain a domicile. The patient is a 
cigarette smoker as well as uses marijuana.

 

MEDICATIONS AT HOME:

1. Trazodone.

2. Risperdal.

3. Buspirone.

4. Lorazepam.

5. Propranolol.

6. Norco.

7. Spironolactone.

8. Potassium replacement, 6 tabs, p.o. 4 times a day unknown mEq strength (in 
the past his last admission [facility] and [facility] stated that he took over 200 mEq p.o. in 
divided doses 4 times a day of potassium).

 

ALLERGIES: NONE.

 

PHYSICAL EXAMINATION:

GENERAL: Reveals a young male. He is supine in bed and in no distress.

VITAL SIGNS: Blood pressure 140/70, heart rate is 92. The initial rhythm in the 
emergency room was sinus rhythm with PACs and possible Wenckebach block now his 
rhythm is normal sinus rhythm without any ectopy.

HEENT: Unremarkable. His mucosa is moist.

NECK: Shows no JVD supine, no carotid bruits, no thyromegaly.

CHEST: Clear.

HEART: Sounds are normal.

ABDOMEN: Soft with positive bowel sounds.

EXTREMITIES: No clubbing, cyanosis or edema.

 

LABORATORY DATA: Sodium 132, potassium less than 1.5, chloride 98, BUN 19, 
creatinine 1.2, phosphorus 1.4, lipase 85. White blood count 28.8, hemoglobin 20
, hematocrit 59, platelet count 432. He has a left shift on his differential as 
well as 1+ reactive lymphocytes. 

 

There is no chest x-ray. EKG: Sinus rhythm with PACs and probable Wenckebach 
intermittently.

 

DIAGNOSES:

1. Severe hypokalemia with multiple admissions for this same problem, mostly 
due to noncompliance with medication intake. Weakness related to this.

2. Potassium wasting nephropathy with renal tubular acidosis.

3. Volume depletion, suspected, given his elevated hemoglobin and hematocrit.

4. Leukocytosis, probably stress related.

5. Psychiatric disorder history

6. Noncompliance history.

 

PLAN: Admit the patient to ICU on telemetry given this dysrhythmia and the 
hypokalemia. Replace potassium both IV and p.o. Replace volume status given the 
hemoconcentration noted on CBC. Watch for fever or an obvious source of 
infection clinically but otherwise he has had leukocytosis in the past, most of 
the time, which is likely reactive and therefore no antibiotics empirically 
will be begun now. Continue his psychiatric and other meds.

 

 

 

DD:10/11/2017 00:29:00  DT: 10/11/2017 01:25  JOB #: [id number]  EXT JOB #:[id number]

KAY

## 2017-10-11 NOTE — DISCHARGE PLAN
Discharge Plan


Disposition: 07 Against Medical Advice


Condition: Serious


Prescriptions: 


Potassium Chloride [K-Dur] 120 meq PO QID #720 tablet


No Smoking: If you smoke, Please STOP!  Call [phone] for help.

## 2018-06-19 ENCOUNTER — HOSPITAL ENCOUNTER (INPATIENT)
Dept: HOSPITAL 76 - ED | Age: 28
LOS: 1 days | Discharge: LEFT BEFORE DISCHARGE | DRG: 645 | End: 2018-06-20
Attending: NURSE PRACTITIONER | Admitting: NURSE PRACTITIONER
Payer: MEDICAID

## 2018-06-19 DIAGNOSIS — Z53.29: ICD-10-CM

## 2018-06-19 DIAGNOSIS — E26.81: Primary | ICD-10-CM

## 2018-06-19 DIAGNOSIS — D72.820: ICD-10-CM

## 2018-06-19 DIAGNOSIS — F41.9: ICD-10-CM

## 2018-06-19 DIAGNOSIS — F17.200: ICD-10-CM

## 2018-06-19 DIAGNOSIS — E86.0: ICD-10-CM

## 2018-06-19 DIAGNOSIS — F32.9: ICD-10-CM

## 2018-06-19 DIAGNOSIS — Z59.0: ICD-10-CM

## 2018-06-19 DIAGNOSIS — T50.996A: ICD-10-CM

## 2018-06-19 LAB
ALBUMIN DIAFP-MCNC: 3.9 G/DL (ref 3.2–5.5)
ALBUMIN/GLOB SERPL: 0.8 {RATIO} (ref 1–2.2)
ALP SERPL-CCNC: 257 IU/L (ref 42–121)
ALT SERPL W P-5'-P-CCNC: 18 IU/L (ref 10–60)
ANION GAP SERPL CALCULATED.4IONS-SCNC: 11 MMOL/L (ref 6–13)
AST SERPL W P-5'-P-CCNC: 31 IU/L (ref 10–42)
BASOPHILS NFR BLD AUTO: 0.1 10^3/UL (ref 0–0.1)
BASOPHILS NFR BLD AUTO: 0.5 %
BILIRUB BLD-MCNC: 1 MG/DL (ref 0.2–1)
BUN SERPL-MCNC: 11 MG/DL (ref 6–20)
CALCIUM UR-MCNC: 8.7 MG/DL (ref 8.5–10.3)
CHLORIDE SERPL-SCNC: 97 MMOL/L (ref 101–111)
CO2 SERPL-SCNC: 20 MMOL/L (ref 21–32)
CREAT SERPLBLD-SCNC: 1.1 MG/DL (ref 0.6–1.2)
EOSINOPHIL # BLD AUTO: 0.5 10^3/UL (ref 0–0.7)
EOSINOPHIL NFR BLD AUTO: 2.3 %
ERYTHROCYTE [DISTWIDTH] IN BLOOD BY AUTOMATED COUNT: 13.4 % (ref 12–15)
GFRSERPLBLD MDRD-ARVRAT: 79 ML/MIN/{1.73_M2} (ref 89–?)
GLOBULIN SER-MCNC: 4.7 G/DL (ref 2.1–4.2)
GLUCOSE SERPL-MCNC: 244 MG/DL (ref 70–100)
HGB UR QL STRIP: 18.9 G/DL (ref 14–18)
LIPASE SERPL-CCNC: 62 U/L (ref 22–51)
LYMPHOCYTES # SPEC AUTO: 1.5 10^3/UL (ref 1.5–3.5)
LYMPHOCYTES NFR BLD AUTO: 7.7 %
MCH RBC QN AUTO: 32.1 PG (ref 27–31)
MCHC RBC AUTO-ENTMCNC: 35.2 G/DL (ref 32–36)
MCV RBC AUTO: 91 FL (ref 80–94)
MONOCYTES # BLD AUTO: 0.6 10^3/UL (ref 0–1)
MONOCYTES NFR BLD AUTO: 3 %
NEUTROPHILS # BLD AUTO: 17.3 10^3/UL (ref 1.5–6.6)
NEUTROPHILS # SNV AUTO: 20 X10^3/UL (ref 4.8–10.8)
NEUTROPHILS NFR BLD AUTO: 86.5 %
PDW BLD AUTO: 6.8 FL (ref 7.4–11.4)
PLATELET # BLD: 406 10^3/UL (ref 130–450)
PROT SPEC-MCNC: 8.6 G/DL (ref 6.7–8.2)
RBC MAR: 5.88 10^6/UL (ref 4.7–6.1)
RBC MORPH BLD: (no result)
SODIUM SERPLBLD-SCNC: 128 MMOL/L (ref 135–145)

## 2018-06-19 PROCEDURE — 36415 COLL VENOUS BLD VENIPUNCTURE: CPT

## 2018-06-19 PROCEDURE — 83735 ASSAY OF MAGNESIUM: CPT

## 2018-06-19 PROCEDURE — 80306 DRUG TEST PRSMV INSTRMNT: CPT

## 2018-06-19 PROCEDURE — 87077 CULTURE AEROBIC IDENTIFY: CPT

## 2018-06-19 PROCEDURE — 99283 EMERGENCY DEPT VISIT LOW MDM: CPT

## 2018-06-19 PROCEDURE — 99284 EMERGENCY DEPT VISIT MOD MDM: CPT

## 2018-06-19 PROCEDURE — 81001 URINALYSIS AUTO W/SCOPE: CPT

## 2018-06-19 PROCEDURE — 96365 THER/PROPH/DIAG IV INF INIT: CPT

## 2018-06-19 PROCEDURE — 84132 ASSAY OF SERUM POTASSIUM: CPT

## 2018-06-19 PROCEDURE — 83690 ASSAY OF LIPASE: CPT

## 2018-06-19 PROCEDURE — 80053 COMPREHEN METABOLIC PANEL: CPT

## 2018-06-19 PROCEDURE — 85025 COMPLETE CBC W/AUTO DIFF WBC: CPT

## 2018-06-19 PROCEDURE — 87086 URINE CULTURE/COLONY COUNT: CPT

## 2018-06-19 RX ADMIN — POTASSIUM CHLORIDE SCH MEQ: 1500 TABLET, EXTENDED RELEASE ORAL at 13:23

## 2018-06-19 RX ADMIN — HYDROCODONE BITARTRATE AND ACETAMINOPHEN PRN TAB: 5; 325 TABLET ORAL at 17:24

## 2018-06-19 RX ADMIN — POTASSIUM CHLORIDE SCH MEQ: 1500 TABLET, EXTENDED RELEASE ORAL at 16:42

## 2018-06-19 RX ADMIN — SODIUM CHLORIDE, PRESERVATIVE FREE SCH ML: 5 INJECTION INTRAVENOUS at 16:43

## 2018-06-19 RX ADMIN — SODIUM CHLORIDE, PRESERVATIVE FREE SCH ML: 5 INJECTION INTRAVENOUS at 23:57

## 2018-06-19 RX ADMIN — HYDROCODONE BITARTRATE AND ACETAMINOPHEN PRN TAB: 5; 325 TABLET ORAL at 23:57

## 2018-06-19 RX ADMIN — SPIRONOLACTONE SCH MG: 25 TABLET, FILM COATED ORAL at 22:09

## 2018-06-19 RX ADMIN — CITALOPRAM HYDROBROMIDE SCH: 10 TABLET ORAL at 22:07

## 2018-06-19 RX ADMIN — PROPRANOLOL HYDROCHLORIDE SCH MG: 10 TABLET ORAL at 22:09

## 2018-06-19 RX ADMIN — SODIUM CHLORIDE, PRESERVATIVE FREE SCH: 5 INJECTION INTRAVENOUS at 16:50

## 2018-06-19 RX ADMIN — POTASSIUM CITRATE AND CITRIC ACID SCH EACH: 3.3; 1.002 GRANULE, FOR SOLUTION ORAL at 23:58

## 2018-06-19 RX ADMIN — POTASSIUM CITRATE AND CITRIC ACID SCH EACH: 3.3; 1.002 GRANULE, FOR SOLUTION ORAL at 17:33

## 2018-06-19 SDOH — ECONOMIC STABILITY - HOUSING INSECURITY: HOMELESSNESS: Z59.0

## 2018-06-19 NOTE — ED PHYSICIAN DOCUMENTATION
History of Present Illness





- Stated complaint


Stated Complaint: RT SIDE BODY PX





- Chief complaint


Chief Complaint: Ext Problem





- History obtained from


History obtained from: Patient





- History of Present Illness


Timing: Yesterday





- Additonal information


Additional information: 


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 

utilization of hospitalization in the past 8 months.  He states that he has 

been doing more outside than usual and feels that he overdid it and now he is 

extremely weak especially on the right side.  He has had these typical symptoms 

previously with profound hypokalemia.  He is indicating that he will be 

compliant today.








Review of Systems


Constitutional: denies: Fever


Eyes: denies: Decreased vision


Ears: denies: Ear pain


Nose: denies: Congestion


Throat: denies: Sore throat


Cardiac: denies: Chest pain / pressure


Respiratory: denies: Dyspnea, Cough


GI: denies: Abdominal Pain, Nausea, Vomiting


: denies: Dysuria, Frequency


Skin: denies: Rash


Musculoskeletal: reports: Back pain, Extremity pain.  denies: Neck pain


Neurologic: reports: Generalized weakness.  denies: Numbness





PD PAST MEDICAL HISTORY





- Past Medical History


Past Medical History: Yes


Cardiovascular: None


Respiratory: None


Endocrine/Autoimmune: None


GI: None


: None


HEENT: None


Psych: Depression, Anxiety


Musculoskeletal: Fatigue


Derm: None, Other





- Past Surgical History


Past Surgical History: Yes





- Present Medications


Home Medications: 


 Ambulatory Orders











 Medication  Instructions  Recorded  Confirmed


 


Potassium Chloride [K-Dur] 120 meq PO QID #720 tablet 10/11/17 














- Allergies


Allergies/Adverse Reactions: 


 Allergies











Allergy/AdvReac Type Severity Reaction Status Date / Time


 


No Known Drug Allergies Allergy   Verified 06/19/18 10:44














- Social History


Does the pt smoke?: Yes


Smoking Status: Current every day smoker


Does the pt drink ETOH?: No


Does the pt have substance abuse?: Yes





- Immunizations


Immunizations are current?: Yes





- POLST


Patient has POLST: No


POLST Status: Full Code





PD ED PE NORMAL





- Vitals


Vital signs reviewed: Yes (hypertensive )





- General


General: Alert and oriented X 3, Well developed/nourished, Other (The patient 

appears to be in pain with  tone and flat affect and he is weak. )





- HEENT


HEENT: Atraumatic, PERRL, EOMI





- Neck


Neck: Supple, no meningeal sign, No bony TTP





- Cardiac


Cardiac: RRR, No murmur





- Respiratory


Respiratory: No respiratory distress, Clear bilaterally





- Abdomen


Abdomen: Soft, Non tender





- Back


Back: No CVA TTP, No spinal TTP





- Derm


Derm: Normal color, Warm and dry, No rash





- Extremities


Extremities: No deformity, No edema





- Neuro


Neuro: Alert and oriented X 3, No motor deficit, No sensory deficit, Normal 

speech, Other (The patient is weak. He requires assistance to sit up . )


Eye Opening: Spontaneous


Motor: Obeys Commands


Verbal: Oriented


GCS Score: 15





- Psych


Psych: Normal mood, Normal affect





Results





- Vitals


Vitals: 


 Vital Signs - 24 hr











  06/19/18





  10:42


 


Temperature 36.2 C L


 


Heart Rate 84


 


Respiratory 18





Rate 


 


Blood Pressure 131/114 H


 


O2 Saturation 97








 Oxygen











O2 Source                      Room air

















- Labs


Labs: 


 Laboratory Tests











  06/19/18 06/19/18





  10:55 10:55


 


WBC  20.0 H 


 


RBC  5.88 


 


Hgb  18.9 H 


 


Hct  53.5 H 


 


MCV  91.0 


 


MCH  32.1 H 


 


MCHC  35.2 


 


RDW  13.4 


 


Plt Count  406 


 


MPV  6.8 L 


 


Neut # (Auto)  17.3 H 


 


Lymph # (Auto)  1.5 


 


Mono # (Auto)  0.6 


 


Eos # (Auto)  0.5 


 


Baso # (Auto)  0.1 


 


Absolute Nucleated RBC  0.01 


 


Nucleated RBC %  0.0 


 


Manual Slide Review  Indicated 


 


RBC Morph Micro Appear  1+ ANISOCYTOSIS 


 


Sodium   128 L


 


Potassium   < 1.5 L*


 


Chloride   97 L


 


Carbon Dioxide   20 L


 


Anion Gap   11.0


 


BUN   11


 


Creatinine   1.1


 


Estimated GFR (MDRD)   79 L


 


Glucose   244 H


 


Calcium   8.7


 


Total Bilirubin   1.0


 


AST   31


 


ALT   18


 


Alkaline Phosphatase   257 H


 


Total Protein   8.6 H


 


Albumin   3.9


 


Globulin   4.7 H


 


Albumin/Globulin Ratio   0.8 L


 


Lipase   62 H














Procedures





- IVC sono (time)


  ** 1110


Bedside IVC sono: IVC measures (cm) (1.22), Dehydration (est 1 liter)





PD MEDICAL DECISION MAKING





- ED course


Complexity details: reviewed old records, reviewed results, re-evaluated patient

, considered differential, d/w patient


ED course: 





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  IV is 

established she is given oral and IV potassium.  He will need admission to the 

hospital.





- Sepsis Event


Vital Signs: 


 Vital Signs - 24 hr











  06/19/18





  10:42


 


Temperature 36.2 C L


 


Heart Rate 84


 


Respiratory 18





Rate 


 


Blood Pressure 131/114 H


 


O2 Saturation 97








 Oxygen











O2 Source                      Room air

















Departure





- Departure


Disposition: [facility] DC/Xfer


Clinical Impression: 


 Hypokalemia, Dehydration

## 2018-06-19 NOTE — XRAY REPORT
Procedure Date:  06/19/2018   

Accession Number:  [id number] / [id number]                    

Procedure:  XR  - Ankle 3 View RT CPT Code:  

 

FULL RESULT:

 

 

EXAM: Ankle 3 View RT

 

DATE: 6/19/2018 1:40 PM

 

CLINICAL HISTORY: lateral pain twisting

 

COMPARISON: None.

 

TECHNIQUE: 3 views.

 

FINDINGS:

 

Bones: No fractures or bone lesions.

 

Joints:  No tibiotalar joint effusion. No subluxations.

 

Soft Tissues: Mild soft tissue swelling.

 

IMPRESSION:

No acute fracture right ankle.

 

RADIA

## 2018-06-19 NOTE — HISTORY & PHYSICAL EXAMINATION
Chief Complaint





- Chief Complaint


Chief Complaint: soreness on extremities





History of Present Illness





- History Obtained From


History obtained from: pt





- History of Present Illness


HPI Comment/Other: 


 is a 27-year-old male with a PMH significant for renal tubular 

acidosis type II with severe hypokalemia, depression, anxiety disorder, tobacco 

abuse, long history of noncompliance with medication, medical treatment and 

leaving AGAINST MEDICAL ADVICE on multiple previous occasions who presents to 

the emergency department with a chief complaint of upper and lower body and 

four extremities soreness. Patient states that the symptoms started yesterday 

when he started to fish. He states initially he was having soreness in the 

upper extremities, then this moved down to his entire body and into his legs. 

He felt very pain specially when he moved his upper extremities. He can not do 

more fishing and even use his hand to eat the food because of the soreness. 

Patient states that when he has soreness like this is usually because his 

potassium is low. He states that he missed a few doses of his potassium, 

dehydration, did too much outside activities and fishing.  His soreness got so 

bad that he decided to come into the emergency department. Patient otherwise 

denies any fevers, chills, headache, chest pain, palpitation, abdominal pain, 

nausea, diarrhea, urinary symptoms or focal neurologic deficits. His potassium 

is less 1.5 in the ER. When I assessed pt at 1600, pt's potassium is still less 

than 1.5. pt still complained of severe soreness. After IV replacement of 

potassium and powder oral potassium, pt reports his soreness reduced 

significantly.








History





- Past Medical History


Cardiovascular: reports: None


Respiratory: reports: None


Neuro: reports: None


Endocrine/Autoimmune: reports: None


GI: reports: None


: reports: None


HEENT: reports: None


Psych: reports: Depression, Anxiety


Musculoskeletal: reports: None


Derm: reports: None, Other


MRSA Hx?: No





- Family & Social History


Family History: Mother: Alive and Well, Father: Alive and Well


Family History Comment/Other: pt report he is homeless, now he is planning to 

live with his mother carrillo. pt report one of his young sister had the 

similar disease as his.


Social History Notes: Patient is currently homeless and lives with his parents 

in a band. He and his family are originally from [place]. He is part of a native 

tried out of [place]. The patient moved to hospitals with his family more 

than 3 years ago. He suffers from a number of mental health issues including 

depression, anxiety, and anger disorder. The patient is unemployed. The patient 

states his and family's thing about moving out to [place].





- Substance History


Use: Uses substance without health or social issues: Tobacco, Cannabis





- POLST


Patient has POLST: No


POLST Status: Full Code





Meds/Allgy





- Home Medications


Home Medications: 


 Ambulatory Orders











 Medication  Instructions  Recorded  Confirmed


 


Buspirone HCl 5 mg PO BID 06/19/18 06/19/18


 


Citalopram Hydrobromide 20 mg PO BID 06/19/18 06/19/18





[Citalopram HBr]   


 


HYDROcod/ACETAM 5/325 [Norco 5/325] 1 tab PO Q6H PRN 06/19/18 06/19/18


 


LORazepam [Lorazepam] 0.5 mg PO TID PRN 06/19/18 06/19/18


 


Potassium Citrate/Citric Acid 4 packet PO Q6H 06/19/18 06/19/18





[Cytra-K Oral Solution]   


 


Propranolol HCl 10 mg PO BID 06/19/18 06/19/18


 


Spironolactone 25 mg PO BID 06/19/18 06/19/18


 


Trazodone HCl 50 mg PO QPM PRN 06/19/18 06/19/18


 


risperiDONE [Risperdal] 2 mg PO QPM 06/19/18 06/19/18














- Allergies


Allergies/Adverse Reactions: 


 Allergies











Allergy/AdvReac Type Severity Reaction Status Date / Time


 


No Known Drug Allergies Allergy   Verified 06/19/18 10:44














Review of Systems





- Constitutional


Constitutional: reports: Weakness.  denies: Fever, Chills, Malaise, Poor 

appetite, Diaphoresis, Night sweats





- Eyes


Eyes: denies: Pain, Amaurosis, Blurred vision, Spots in vision, Field loss, 

Vision loss, Dipolpia





- Ears, Nose & Throat


Ears, Nose & Throat: denies: Ear pain, Hearing loss, Hearing aids, Tinnitus, 

Vertigo, Nasal pain, Nasal discharge, Nosebleeds, Nasal obstruction, Nasal 

congestion, Dentures, Sore throat, Mouth lesions, Bleeding gums, Dental decay





- Cardiovascular


Cariovascular: denies: Irregular heart rate, Palpitations, Chest pain, Edema, 

Lightheadedness, Syncope, Exertional dyspnea, Decr. exercise tolerance





- Respiratory


Respiratory: denies: Cough, Sputum production, Wheezing, Snoring, Hemoptysis, 

Orthopnea, SOB at rest, SOB with exertion





- Gastrointestinal


Gastrointestinal: denies: Abdominal pain, Abdominal distention, Constipation, 

Diarrhea, Change in bowel habits, Rectal bleeding, Black stools, Bloody stools, 

Nausea, Vomiting, Bile emesis, Tutu blood emesis, Coffee grounds emesis





- Genitourinary


Genitourinary: denies: Dysuria, Frequency, Urgency, Hematuria, Incontinence, 

Flank pain, Nocturia, Urethral discharge





- Musculoskeletal


Musculoskeletal: reports: Muscle pain, Muscle aches, Muscle weakness.  denies: 

Back pain, Stiffness, Limited range of motion, Gout, Joint pain, Joint swelling





- Integumentary


Integumentary: denies: Rash, Pruritis, Lesions, Dryness, Lumps, Acne, Pigment 

changes, Nail changes





- Neurological


Neurological: reports: General weakness.  denies: Focal weakness, Headache, 

Dizziness, Numbness, Memory problems, Pre-existing deficit, Abnormal gait, 

Seizures, Incoordination, Slurred speech





- Psychiatric


Psychiatric: denies: Depression, Anxiety, Suicidal, Delusions, Hallucinations, 

Homicidal





- Endocrine


Endocrine: denies: Polyuria, Polydypsia, Polyphagia, Intolerance to cold





- Hematologic/Lymphatic


Hematologic/Lymphatic: denies: Anemia, Bruising, Petechiae, Blood clots, 

Lymphadenopathy, Bleeding tendencies, Recurrent infections





Exam





- Vital Signs


Reviewed Vital Signs: Yes


Vital Signs: 





 Vital Signs x48h











  Temp Pulse Pulse Pulse Resp BP BP


 


 06/19/18 16:59     101 H   


 


 06/19/18 16:21  36.2 C L   78   20   114/58 L


 


 06/19/18 14:11  36.6 C   75   19   107/70


 


 06/19/18 12:42   77    12  107/71 














  Pulse Ox


 


 06/19/18 16:59 


 


 06/19/18 16:21  100


 


 06/19/18 14:11  100


 


 06/19/18 12:42  99














- Physical Exam


General Appearance: positive: Alert, Mild distress.  negative: Lethargic


Eyes Bilateral: positive: Normal inspection, PERRL, No lid inflammation, 

Conjunctivae nml


ENT: positive: ENT inspection nml, Pharynx nml, No signs of dehydration.  

negative: Purulent nasal drainage, Pharyngeal erythema, Oral lesions


Neck: positive: Nml inspection, Thyroid nml, No JVD, Trachea midline.  negative

: Thyromegaly, Lymphadenopathy (R), Lymphadenopathy (L), Stiff neck, Carotid 

bruit, Swelling/bruising, Tracheal deviation


Respiratory: positive: Chest non-tender, No respiratory distress, Breath sounds 

nml.  negative: Wheezes, Rales, Rhonchi


Cardiovascular: positive: Regular rate & rhythm, No murmur, No gallop.  negative

: Irregularly irregular, Extrasystoles, Tachycardia, Bradycardia, JVD present, 

Systolic murmur, Diastolic murmur


Peripheral Pulses: positive: 2+


Abdomen: positive: Non-tender, No organomegaly, Nml bowel sounds, No 

distention.  negative: Tenderness, Guarding, Rebound


Back: positive: Nml inspection.  negative: CVA tenderness (R), CVA tenderness (L

)


Skin: positive: Color nml, No rash, Warm, Dry.  negative: Cyanosis, Diaphoresis

, Pallor


Extremities: positive: Non-tender, Full ROM, Nml appearance.  negative: Pedal 

edema, Calf tenderness, Joint swelling, Christel's sign/cords


Neurologic/Psychiatric: positive: Oriented x3, Sensation nml, Mood/affect nml.  

negative: Sensory loss, Facial droop, Slurred/abnml speech, Depressed mood/

affect





Conclusion/Plan





- Problem List


(1) Hypokalemia


Conclusion/Plan: 


k is less 1.5 in the test, pt had multiple admission for the similar problem 

after pt did not medical compliant. pt had a rare bartter syndrome


replace of potassium


check potassium left timed


EKG PRN


tele and vital monitor








(2) Renal tubular acidosis type II


Conclusion/Plan: 


genetic problem, pt's sister had the same diagnosis


referral to out-pt nephrologist management


advise pt medical compliance


continue support








(3) Hyponatremia


Conclusion/Plan: 


part problem of Bartter syndrome


replace with IV of NS


continue lab monitor








(4) Muscle soreness


Conclusion/Plan: 


pt complains of soreness in upper and low extremities. pt report he had 

significant improvement after replacement of potassium


continue potassium replacement


lab monitor


  








(5) Medical non-compliance


Conclusion/Plan: 


advise pt for medical compliance








(6) Lymphocytosis


Conclusion/Plan: 


chronic condition. it may be caused mental and physical distress and bartter 

syndrome. pt denies fever, chill, cough, SOB, dysuria.


order UA, follow up


daily lab monitor








(7) DVT prophylaxis


Conclusion/Plan: 


SCD








(8) Full code status


Conclusion/Plan: 


pt request full code








- Lab Results


Fish Bones: 


 06/20/18 05:00





 06/20/18 05:00





Core Measures





- Anticipated LOS


I expect patient to be DC'd or transferred within 96 hours.: Yes





- DVT/VTE - Prophylaxis


VTE/DVT Device ordered at admit?: Yes

## 2018-06-20 VITALS — SYSTOLIC BLOOD PRESSURE: 110 MMHG | DIASTOLIC BLOOD PRESSURE: 65 MMHG

## 2018-06-20 LAB
ALBUMIN DIAFP-MCNC: 2.9 G/DL (ref 3.2–5.5)
ALBUMIN/GLOB SERPL: 0.8 {RATIO} (ref 1–2.2)
ALP SERPL-CCNC: 183 IU/L (ref 42–121)
ALT SERPL W P-5'-P-CCNC: 16 IU/L (ref 10–60)
AMPHET UR QL SCN: NEGATIVE
ANION GAP SERPL CALCULATED.4IONS-SCNC: 11 MMOL/L (ref 6–13)
AST SERPL W P-5'-P-CCNC: 32 IU/L (ref 10–42)
BASOPHILS NFR BLD AUTO: 0.1 10^3/UL (ref 0–0.1)
BASOPHILS NFR BLD AUTO: 0.5 %
BENZODIAZ UR QL SCN: NEGATIVE
BILIRUB BLD-MCNC: 0.9 MG/DL (ref 0.2–1)
BUN SERPL-MCNC: 8 MG/DL (ref 6–20)
CALCIUM UR-MCNC: 7.7 MG/DL (ref 8.5–10.3)
CHLORIDE SERPL-SCNC: 100 MMOL/L (ref 101–111)
CLARITY UR REFRACT.AUTO: CLEAR
CO2 SERPL-SCNC: 28 MMOL/L (ref 21–32)
COCAINE UR-SCNC: NEGATIVE UMOL/L
CREAT SERPLBLD-SCNC: 0.8 MG/DL (ref 0.6–1.2)
EOSINOPHIL # BLD AUTO: 0.4 10^3/UL (ref 0–0.7)
EOSINOPHIL NFR BLD AUTO: 2.5 %
ERYTHROCYTE [DISTWIDTH] IN BLOOD BY AUTOMATED COUNT: 13.5 % (ref 12–15)
GFRSERPLBLD MDRD-ARVRAT: 114 ML/MIN/{1.73_M2} (ref 89–?)
GLOBULIN SER-MCNC: 3.5 G/DL (ref 2.1–4.2)
GLUCOSE SERPL-MCNC: 95 MG/DL (ref 70–100)
GLUCOSE UR QL STRIP.AUTO: NEGATIVE MG/DL
HGB UR QL STRIP: 15.7 G/DL (ref 14–18)
KETONES UR QL STRIP.AUTO: NEGATIVE MG/DL
LYMPHOCYTES # SPEC AUTO: 3.3 10^3/UL (ref 1.5–3.5)
LYMPHOCYTES NFR BLD AUTO: 21.6 %
MAGNESIUM SERPL-MCNC: 1.9 MG/DL (ref 1.7–2.8)
MCH RBC QN AUTO: 32.6 PG (ref 27–31)
MCHC RBC AUTO-ENTMCNC: 35.3 G/DL (ref 32–36)
MCV RBC AUTO: 92.3 FL (ref 80–94)
METHADONE UR QL SCN: NEGATIVE
METHAMPHET UR QL SCN: NEGATIVE
MONOCYTES # BLD AUTO: 0.9 10^3/UL (ref 0–1)
MONOCYTES NFR BLD AUTO: 5.8 %
NEUTROPHILS # BLD AUTO: 10.6 10^3/UL (ref 1.5–6.6)
NEUTROPHILS # SNV AUTO: 15.2 X10^3/UL (ref 4.8–10.8)
NEUTROPHILS NFR BLD AUTO: 69.6 %
NITRITE UR QL STRIP.AUTO: NEGATIVE
OPIATES UR QL SCN: POSITIVE
PDW BLD AUTO: 7 FL (ref 7.4–11.4)
PH UR STRIP.AUTO: 8.5 PH (ref 5–7.5)
PLATELET # BLD: 332 10^3/UL (ref 130–450)
PROT SPEC-MCNC: 6.4 G/DL (ref 6.7–8.2)
PROT UR STRIP.AUTO-MCNC: 30 MG/DL
RBC # UR STRIP.AUTO: (no result) /UL
RBC # URNS HPF: (no result) /HPF (ref 0–5)
RBC MAR: 4.83 10^6/UL (ref 4.7–6.1)
SODIUM SERPLBLD-SCNC: 139 MMOL/L (ref 135–145)
SP GR UR STRIP.AUTO: 1.01 (ref 1–1.03)
SQUAMOUS URNS QL MICRO: (no result)
UROBILINOGEN UR QL STRIP.AUTO: (no result) E.U./DL
UROBILINOGEN UR STRIP.AUTO-MCNC: NEGATIVE MG/DL
VOLATILE DRUGS POS SERPL SCN: (no result)
WBC CLUMPS URNS QL MICRO: PRESENT

## 2018-06-20 RX ADMIN — SODIUM CHLORIDE, PRESERVATIVE FREE SCH ML: 5 INJECTION INTRAVENOUS at 08:02

## 2018-06-20 RX ADMIN — POTASSIUM CITRATE AND CITRIC ACID SCH: 3.3; 1.002 GRANULE, FOR SOLUTION ORAL at 05:55

## 2018-06-20 RX ADMIN — SPIRONOLACTONE SCH: 25 TABLET, FILM COATED ORAL at 08:02

## 2018-06-20 RX ADMIN — CITALOPRAM HYDROBROMIDE SCH: 10 TABLET ORAL at 08:01

## 2018-06-20 RX ADMIN — PROPRANOLOL HYDROCHLORIDE SCH: 10 TABLET ORAL at 08:02

## 2018-06-20 NOTE — DISCHARGE SUMMARY
Discharge Summary


Discharge Date: 06/20/18


Discharging Provider: MALICK CONCEPCION


Condition at Discharge: Fair


Discharge Disposition: 07 Against Medical Advice


Discharge Facility Name: home





- DIAGNOSES


Admission Diagnoses: 


(1) Hypokalemia





(2) Renal tubular acidosis type II








(3) Hyponatremia








(4) Muscle soreness





  


(5) Medical non-compliance








(6) Lymphocytosis





Discharge Diagnoses with Status of Each Condition: 


(1) Hypokalemia


pt had potassium 2.1 at morning. pt state he was very strong. He refused inpt 

more and signed AMA left hospital. pt was advised whole risks of AMA.


(2) Renal tubular acidosis type II


chronic and genetic disease





(3) Hyponatremia


improved, signed AMA left 





(4) Muscle soreness


resolved


  


(5) Medical non-compliance


advise for medical compliance





(6) Lymphocytosis


improved





- HPI


History of Present Illness: 


 is a 27-year-old male with a PMH significant for renal tubular 

acidosis type II with severe hypokalemia, depression, anxiety disorder, tobacco 

abuse, long history of noncompliance with medication, medical treatment and 

leaving AGAINST MEDICAL ADVICE on multiple previous occasions who presents to 

the emergency department with a chief complaint of upper and lower body and 

four extremities soreness. Patient states that the symptoms started yesterday 

when he started to fish. He states initially he was having soreness in the 

upper extremities, then this moved down to his entire body and into his legs. 

He felt very pain specially when he moved his upper extremities. He can not do 

more fishing and even use his hand to eat the food because of the soreness. 

Patient states that when he has soreness like this is usually because his 

potassium is low. He states that he missed a few doses of his potassium, 

dehydration, did too much outside activities and fishing.  His soreness got so 

bad that he decided to come into the emergency department. Patient otherwise 

denies any fevers, chills, headache, chest pain, palpitation, abdominal pain, 

nausea, diarrhea, urinary symptoms or focal neurologic deficits. His potassium 

is less 1.5 in the ER. When I assessed pt at 1600, pt's potassium is still less 

than 1.5. pt still complained of severe soreness. After IV replacement of 

potassium and powder oral potassium, pt reports his soreness reduced 

significantly.








- ALLERGIES


Allergies/Adverse Reactions: 


 Allergies











Allergy/AdvReac Type Severity Reaction Status Date / Time


 


No Known Drug Allergies Allergy   Verified 06/19/18 10:44














- MEDICATIONS


Home Medications: 


 Ambulatory Orders











 Medication  Instructions  Recorded  Confirmed


 


Buspirone HCl 5 mg PO BID 06/19/18 06/19/18


 


Citalopram Hydrobromide 20 mg PO BID 06/19/18 06/19/18





[Citalopram HBr]   


 


HYDROcod/ACETAM 5/325 [Norco 5/325] 1 tab PO Q6H PRN 06/19/18 06/19/18


 


LORazepam [Lorazepam] 0.5 mg PO TID PRN 06/19/18 06/19/18


 


Potassium Citrate/Citric Acid 4 packet PO Q6H 06/19/18 06/19/18





[Cytra-K Oral Solution]   


 


Propranolol HCl 10 mg PO BID 06/19/18 06/19/18


 


Spironolactone 25 mg PO BID 06/19/18 06/19/18


 


Trazodone HCl 50 mg PO QPM PRN 06/19/18 06/19/18


 


risperiDONE [Risperdal] 2 mg PO QPM 06/19/18 06/19/18














- PHYSICAL EXAM AT DISCHARGE


Physical Exam Other/Comments: 


pt signed AMA and left hospital, I could not assess pt








- LABS


Result Diagrams: 


 06/20/18 05:00





 06/20/18 05:00





- TIME SPENT


Time Spent in Discharge (Minutes): 35

## 2018-07-13 ENCOUNTER — HOSPITAL ENCOUNTER (INPATIENT)
Dept: HOSPITAL 76 - ED | Age: 28
LOS: 1 days | Discharge: HOME | DRG: 699 | End: 2018-07-14
Attending: INTERNAL MEDICINE | Admitting: INTERNAL MEDICINE
Payer: MEDICAID

## 2018-07-13 ENCOUNTER — HOSPITAL ENCOUNTER (OUTPATIENT)
Dept: HOSPITAL 76 - EMS | Age: 28
Discharge: ANOTHER HOSPITAL | End: 2018-07-13
Attending: SURGERY
Payer: MEDICAID

## 2018-07-13 DIAGNOSIS — E87.1: ICD-10-CM

## 2018-07-13 DIAGNOSIS — N25.89: Primary | ICD-10-CM

## 2018-07-13 DIAGNOSIS — T50.3X6A: ICD-10-CM

## 2018-07-13 DIAGNOSIS — F17.210: ICD-10-CM

## 2018-07-13 DIAGNOSIS — E86.0: ICD-10-CM

## 2018-07-13 DIAGNOSIS — R53.1: Primary | ICD-10-CM

## 2018-07-13 DIAGNOSIS — F41.8: ICD-10-CM

## 2018-07-13 DIAGNOSIS — Z91.14: ICD-10-CM

## 2018-07-13 LAB
ALBUMIN DIAFP-MCNC: 3.6 G/DL (ref 3.2–5.5)
ALBUMIN/GLOB SERPL: 0.7 {RATIO} (ref 1–2.2)
ALP SERPL-CCNC: 212 IU/L (ref 42–121)
ALT SERPL W P-5'-P-CCNC: 22 IU/L (ref 10–60)
ANION GAP SERPL CALCULATED.4IONS-SCNC: 10 MMOL/L (ref 6–13)
ANION GAP SERPL CALCULATED.4IONS-SCNC: 13 MMOL/L (ref 6–13)
ANION GAP SERPL CALCULATED.4IONS-SCNC: 9 MMOL/L (ref 6–13)
AST SERPL W P-5'-P-CCNC: 32 IU/L (ref 10–42)
BASOPHILS NFR BLD AUTO: 0.1 10^3/UL (ref 0–0.1)
BASOPHILS NFR BLD AUTO: 0.6 %
BILIRUB BLD-MCNC: 0.7 MG/DL (ref 0.2–1)
BUN SERPL-MCNC: 12 MG/DL (ref 6–20)
BUN SERPL-MCNC: 12 MG/DL (ref 6–20)
BUN SERPL-MCNC: 13 MG/DL (ref 6–20)
CALCIUM UR-MCNC: 7.4 MG/DL (ref 8.5–10.3)
CALCIUM UR-MCNC: 7.9 MG/DL (ref 8.5–10.3)
CALCIUM UR-MCNC: 8.8 MG/DL (ref 8.5–10.3)
CHLORIDE SERPL-SCNC: 94 MMOL/L (ref 101–111)
CHLORIDE SERPL-SCNC: 96 MMOL/L (ref 101–111)
CHLORIDE SERPL-SCNC: 97 MMOL/L (ref 101–111)
CO2 SERPL-SCNC: 17 MMOL/L (ref 21–32)
CO2 SERPL-SCNC: 27 MMOL/L (ref 21–32)
CO2 SERPL-SCNC: 28 MMOL/L (ref 21–32)
CREAT SERPLBLD-SCNC: 1 MG/DL (ref 0.6–1.2)
CREAT SERPLBLD-SCNC: 1.2 MG/DL (ref 0.6–1.2)
CREAT SERPLBLD-SCNC: 1.2 MG/DL (ref 0.6–1.2)
EOSINOPHIL # BLD AUTO: 0.8 10^3/UL (ref 0–0.7)
EOSINOPHIL NFR BLD AUTO: 4 %
ERYTHROCYTE [DISTWIDTH] IN BLOOD BY AUTOMATED COUNT: 13.1 % (ref 12–15)
EST. AVERAGE GLUCOSE BLD GHB EST-MCNC: 97 MG/DL (ref 70–100)
GFRSERPLBLD MDRD-ARVRAT: 71 ML/MIN/{1.73_M2} (ref 89–?)
GFRSERPLBLD MDRD-ARVRAT: 71 ML/MIN/{1.73_M2} (ref 89–?)
GFRSERPLBLD MDRD-ARVRAT: 88 ML/MIN/{1.73_M2} (ref 89–?)
GLOBULIN SER-MCNC: 5.1 G/DL (ref 2.1–4.2)
GLUCOSE SERPL-MCNC: 116 MG/DL (ref 70–100)
GLUCOSE SERPL-MCNC: 137 MG/DL (ref 70–100)
GLUCOSE SERPL-MCNC: 190 MG/DL (ref 70–100)
HB2 TOTAL: 20.8 G/DL
HBA1C BLD-MCNC: 0.64 G/DL
HEMOGLOBIN A1C %: 5 % (ref 4.6–6.2)
HGB UR QL STRIP: 18.6 G/DL (ref 14–18)
LIPASE SERPL-CCNC: 67 U/L (ref 22–51)
LYMPHOCYTES # SPEC AUTO: 1.5 10^3/UL (ref 1.5–3.5)
LYMPHOCYTES NFR BLD AUTO: 7.8 %
MCH RBC QN AUTO: 31.3 PG (ref 27–31)
MCHC RBC AUTO-ENTMCNC: 35.4 G/DL (ref 32–36)
MCV RBC AUTO: 88.4 FL (ref 80–94)
MONOCYTES # BLD AUTO: 0.6 10^3/UL (ref 0–1)
MONOCYTES NFR BLD AUTO: 3.4 %
NEUTROPHILS # BLD AUTO: 15.7 10^3/UL (ref 1.5–6.6)
NEUTROPHILS # SNV AUTO: 18.7 X10^3/UL (ref 4.8–10.8)
NEUTROPHILS NFR BLD AUTO: 84.2 %
PDW BLD AUTO: 6.8 FL (ref 7.4–11.4)
PLAT MORPH BLD: (no result)
PLATELET # BLD: 474 10^3/UL (ref 130–450)
PLATELET BLD QL SMEAR: (no result)
PROT SPEC-MCNC: 8.7 G/DL (ref 6.7–8.2)
RBC MAR: 5.94 10^6/UL (ref 4.7–6.1)
RBC MORPH BLD: (no result)
SODIUM SERPLBLD-SCNC: 127 MMOL/L (ref 135–145)
SODIUM SERPLBLD-SCNC: 132 MMOL/L (ref 135–145)
SODIUM SERPLBLD-SCNC: 132 MMOL/L (ref 135–145)

## 2018-07-13 PROCEDURE — 83036 HEMOGLOBIN GLYCOSYLATED A1C: CPT

## 2018-07-13 PROCEDURE — 87150 DNA/RNA AMPLIFIED PROBE: CPT

## 2018-07-13 PROCEDURE — 96365 THER/PROPH/DIAG IV INF INIT: CPT

## 2018-07-13 PROCEDURE — 36415 COLL VENOUS BLD VENIPUNCTURE: CPT

## 2018-07-13 PROCEDURE — 84100 ASSAY OF PHOSPHORUS: CPT

## 2018-07-13 PROCEDURE — 80048 BASIC METABOLIC PNL TOTAL CA: CPT

## 2018-07-13 PROCEDURE — 82040 ASSAY OF SERUM ALBUMIN: CPT

## 2018-07-13 PROCEDURE — 82330 ASSAY OF CALCIUM: CPT

## 2018-07-13 PROCEDURE — 99283 EMERGENCY DEPT VISIT LOW MDM: CPT

## 2018-07-13 PROCEDURE — 93005 ELECTROCARDIOGRAM TRACING: CPT

## 2018-07-13 PROCEDURE — 83735 ASSAY OF MAGNESIUM: CPT

## 2018-07-13 PROCEDURE — 85025 COMPLETE CBC W/AUTO DIFF WBC: CPT

## 2018-07-13 PROCEDURE — 83690 ASSAY OF LIPASE: CPT

## 2018-07-13 PROCEDURE — 99284 EMERGENCY DEPT VISIT MOD MDM: CPT

## 2018-07-13 PROCEDURE — 80053 COMPREHEN METABOLIC PANEL: CPT

## 2018-07-13 RX ADMIN — OXYCODONE PRN MG: 5 TABLET ORAL at 18:19

## 2018-07-13 RX ADMIN — SODIUM CHLORIDE SCH MLS/HR: 9 INJECTION, SOLUTION INTRAVENOUS at 13:41

## 2018-07-13 RX ADMIN — NICOTINE SCH: 14 PATCH TRANSDERMAL at 14:42

## 2018-07-13 RX ADMIN — SPIRONOLACTONE SCH MG: 25 TABLET, FILM COATED ORAL at 13:50

## 2018-07-13 RX ADMIN — SODIUM CHLORIDE SCH MLS/HR: 9 INJECTION, SOLUTION INTRAVENOUS at 17:49

## 2018-07-13 RX ADMIN — POTASSIUM CITRATE AND CITRIC ACID SCH EACH: 3.3; 1.002 GRANULE, FOR SOLUTION ORAL at 13:50

## 2018-07-13 RX ADMIN — SODIUM CHLORIDE SCH MLS/HR: 9 INJECTION, SOLUTION INTRAVENOUS at 21:55

## 2018-07-13 RX ADMIN — SODIUM CHLORIDE, PRESERVATIVE FREE SCH ML: 5 INJECTION INTRAVENOUS at 17:49

## 2018-07-13 RX ADMIN — OXYCODONE PRN MG: 5 TABLET ORAL at 14:33

## 2018-07-13 RX ADMIN — POTASSIUM CITRATE AND CITRIC ACID SCH EACH: 3.3; 1.002 GRANULE, FOR SOLUTION ORAL at 21:16

## 2018-07-13 NOTE — ED PHYSICIAN DOCUMENTATION
History of Present Illness





- Stated complaint


Stated Complaint: LOW POTASSIUM





- Chief complaint


Chief Complaint: General





- History obtained from


History obtained from: Patient





- History of Present Illness


Timing: Last night





- Additonal information


Additional information: 


The patient is a 28-year-old male with history of chronic renal tubular 

acidosis and history of numerous hospitalizations for hypokalemia, who presents 

with fatigue and weakness that started last night.  He states, "It's what 

happens when my potassium is low."  He complains of feeling generalized muscle 

pains all over.  He denies fever, headache, shortness of breath, nausea, 

vomiting, or dysuria.  He does report recent cough that produces scant sputum.


Review of his medical record reveals hospitalization here last month for 

similar symptoms.  He smokes marijuana, and denies any use of other drugs.








Review of Systems


Constitutional: reports: Myalgias, Fatigue.  denies: Fever


Ears: denies: Tinnitus/ringing


Nose: denies: Congestion


Throat: denies: Sore throat


Cardiac: denies: Chest pain / pressure


Respiratory: reports: Cough.  denies: Dyspnea


GI: denies: Abdominal Pain, Nausea, Vomiting


: denies: Dysuria


Skin: denies: Rash


Musculoskeletal: reports: Other (Generalized muscle aches.)


Neurologic: reports: Generalized weakness.  denies: Focal weakness, Numbness, 

Headache





PD PAST MEDICAL HISTORY





- Past Medical History


Past Medical History: Yes


Cardiovascular: None


Respiratory: None


Neuro: None


Endocrine/Autoimmune: None


GI: None


GYN: Other (Renal tubular acidosis)


: None


HEENT: None


Psych: Depression, Anxiety


Musculoskeletal: None


Derm: None, Other


Other Past Medical History: Renal tubular acidosis (bittlemans disease)





- Past Surgical History


Past Surgical History: Yes





- Present Medications


Home Medications: 


 Ambulatory Orders











 Medication  Instructions  Recorded  Confirmed


 


Potassium Citrate [Potassium 60 meq PO BID 07/13/18 07/13/18





Citrate ER]   














- Allergies


Allergies/Adverse Reactions: 


 Allergies











Allergy/AdvReac Type Severity Reaction Status Date / Time


 


No Known Drug Allergies Allergy   Verified 06/19/18 10:44














- Social History


Does the pt smoke?: Yes


Smoking Status: Current every day smoker


Does the pt drink ETOH?: No


Does the pt have substance abuse?: Yes


Substance Use and Type: Marijuana





- Immunizations


Immunizations are current?: Yes





- POLST


Patient has POLST: No


POLST Status: Full Code





PD ED PE NORMAL





- Vitals


Vital signs reviewed: Yes (normal)





- General


General: Alert and oriented X 3, Well developed/nourished





- HEENT


HEENT: Atraumatic, Moist mucous membranes, Pharynx benign





- Neck


Neck: No adenopathy, No JVD





- Cardiac


Cardiac: RRR





- Respiratory


Respiratory: No respiratory distress, Clear bilaterally





- Abdomen


Abdomen: Soft, Non tender





- Back


Back: No CVA TTP





- Derm


Derm: No rash





- Extremities


Extremities: No edema, No calf tenderness / cord





- Neuro


Neuro: Alert and oriented X 3, Normal speech, Other (No focal motor or sensory 

deficit.)





Results





- Vitals


Vitals: 


 Vital Signs - 24 hr











  07/13/18 07/13/18





  10:36 12:22


 


Temperature 36.4 C L 


 


Heart Rate 101 H 88


 


Respiratory 18 15





Rate  


 


Blood Pressure 126/81 H 116/77


 


O2 Saturation 97 97








 Oxygen











O2 Source                      Room air

















- EKG (time done)


  ** 10:35


Rate: Rate (enter#) (101)


Rhythm: Atrial fibrillation


Axis: Normal


Intervals: Other (Nonspecific IVCD.)


Ischemia: ST depression (Diffusely)


Compare to prior EKG: Unchanged from prior EKG


Computer interpretation: Disagree with computer (No acute lateral infarct.)





- Labs


Labs: 


 Laboratory Tests











  07/13/18 07/13/18 07/13/18





  10:35 10:53 10:53


 


WBC   18.7 H 


 


RBC   5.94 


 


Hgb   18.6 H 


 


Hct   52.4 H 


 


MCV   88.4 


 


MCH   31.3 H 


 


MCHC   35.4 


 


RDW   13.1 


 


Plt Count   474 H 


 


MPV   6.8 L 


 


Neut # (Auto)   15.7 H 


 


Lymph # (Auto)   1.5 


 


Mono # (Auto)   0.6 


 


Eos # (Auto)   0.8 H 


 


Baso # (Auto)   0.1 


 


Absolute Nucleated RBC   0.01 


 


Nucleated RBC %   0.1 


 


Manual Slide Review   Indicated 


 


WBC Morphology   NORMAL APPEARANCE 


 


Platelet Estimate   INCREASED (>450,000) 


 


Platelet Morphology   NORMAL APPEARANCE 


 


RBC Morph Micro Appear   NORMAL APPEARANCE 


 


Sodium    127 L


 


Potassium    < 1.5 L*


 


Chloride    97 L


 


Carbon Dioxide    17 L


 


Anion Gap    13.0


 


BUN    12


 


Creatinine    1.2


 


Estimated GFR (MDRD)    71 L


 


Glucose    190 H


 


Glycated Hemoglobin  5.0  


 


Estim Average Glucose  97  


 


Calcium    8.8


 


Total Bilirubin    0.7


 


AST    32


 


ALT    22


 


Alkaline Phosphatase    212 H


 


Total Protein    8.7 H


 


Albumin    3.6


 


Globulin    5.1 H


 


Albumin/Globulin Ratio    0.7 L


 


Lipase    67 H














PD MEDICAL DECISION MAKING





- ED course


Complexity details: reviewed old records, reviewed results, re-evaluated patient

, considered differential, d/w patient, d/w consultant


ED course: 


The patient's presentation is significant for profound hypokalemia, with 

potassium of less than 1.5.  Lab results also reveal hyponatremia with a sodium 

of 127.  His white count of elevated at 18.7.  His presentation today is the 

same as multiple previous presentations which have required overnight 

hospitalization for potassium replacement.


Treatment in the emergency department included administration of supplemental 

potassium, 10 mEq IV, and 40 mEq orally.  Fentanyl 50 mcg was administered IV.  

I discussed his condition with Dr. Samuel who accepts him for further evaluation 

and treatment.








- Sepsis Event


Vital Signs: 


 Vital Signs - 24 hr











  07/13/18 07/13/18





  10:36 12:22


 


Temperature 36.4 C L 


 


Heart Rate 101 H 88


 


Respiratory 18 15





Rate  


 


Blood Pressure 126/81 H 116/77


 


O2 Saturation 97 97








 Oxygen











O2 Source                      Room air

















Departure





- Departure


Disposition: ED Place in Observation


Clinical Impression: 


 Hypokalemia, Generalized muscle ache, Hyponatremia





Condition: Stable


Discharge Date/Time: 07/13/18 13:15

## 2018-07-13 NOTE — HISTORY & PHYSICAL EXAMINATION
Chief Complaint





- Chief Complaint


Chief Complaint: Soreness





History of Present Illness





- Admitted From


Admitted From:: Emergency Department





- History Obtained From


Records Reviewed: Yes


History obtained from: Patient


Exam Limitations: None





- History of Present Illness


HPI Comment/Other: 





Patient is a 28-year-old gentleman with a past medical history significant for 

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 

disorder, tobacco abuse, long history of noncompliance with medication, medical 

treatment and leaving AGAINST MEDICAL ADVICE on multiple previous 

hospitalizations for hypokalemia who presents to the emergency department today 

with chief complaint of soreness.  The patient states that he was in his normal 

state of health until yesterday when he states that he overdid it loading his 

boat.  He states that he was loading his boat to go out crabbing and thinks 

that he overexerted himself.  He states that by the end of the day he had 

soreness in his upper and lower extremities.  He states that when he woke up 

this morning he was sore all over and could not lift up his legs.  He states 

that he was having cramping in his legs and felt as though his arms and legs 

were weak.  He states that he usually gets like this when his potassium is low 

so he decided to come to the emergency department.  The patient states that he 

has been taking his potassium pills regularly but does admit to missing 1 dose.

  





The patient denies any headaches, blurred vision, runny nose, sore throat, 

nasal congestion, difficulty swallowing, chest pain, shortness of air, orthopnea

, PND, increased lower extremity swelling, fevers, chills, palpitations, 

abdominal pain, nausea, vomiting, diarrhea, constipation, increased urinary 

urgency, increased urinary frequency, dysuria, polyuria, polydipsia, joint 

swelling, neck stiffness, hair loss, skin rash, changes in his appetite, recent 

unintentional weight loss, night sweats or any focal neurologic deficits.





On presentation to the emergency department the patient was afebrile, 

tachycardic with a heart rate of 101 and otherwise normotensive and not in any 

respiratory distress.  The patient underwent routine lab work which did reveal 

a potassium of less than 1.5 and the patient did appear to be dehydrated as his 

chloride was 97 and bicarb 17.  The patient also had a sodium of just 127 and 

his creatinine was mildly elevated from baseline at 1.2.  The patient was also 

hyperglycemic with a glucose of 190.  The patient's alk phos was mildly 

elevated at 212 which is not far off his baseline.  The patient's WBC was 

elevated at 18.7 but is also chronically elevated.  Patient's hemoglobin was 

18.6 and also is chronically elevated.  The patient's platelets are 474.  The 

patient likely has hemoconcentration of his CBC as he is likely dehydrated.  

The patient was admitted to the hospital in the intensive care unit for his 

extremely low potassium of less than 1.5.  The patient will need monitoring on 

telemetry and frequent blood draws as well as IV and p.o. replacement of 

potassium and that is why he is being placed in the intensive care unit under a 

monitor.





History





- Past Medical History


Cardiovascular: reports: None


Respiratory: reports: None


Neuro: reports: None


Endocrine/Autoimmune: reports: None


GI: reports: None


: reports: None


HEENT: reports: None


Psych: reports: Depression, Anxiety


Musculoskeletal: reports: None


Derm: reports: None, Other


MRSA Hx?: No


Other Past Medical History: Renal tubular acidosis (bittlemans disease)





- Family & Social History


Family History: Mother: Alive and Well, Father: Alive and Well


Family History Comment/Other: Patients half sister has Gettlemans disease


Living arrangement: Homeless


Living Situation: With family


Social History Notes: Patient is currently living in a trailer near [facility] and lives with his parents. He and his family are originally from 

[place]. He is part of a native [place] out of [place]. The patient moved to 

[facility] with his family more than 3 years ago. He suffers from a number 

of mental health issues including depression, anxiety, and anger disorder. The 

patient is unemployed. The patient states his and family's thing about moving 

out to [place]. He continues to smoke half a pack a day. He occasionaly 

smoke marijuana and denies any alcohol or other drug use.





- Substance History


Use: Uses substance without health or social issues: Tobacco, Cannabis





- POLST


Patient has POLST: No


POLST Status: Full Code





Meds/Allgy





- Home Medications


Home Medications: 


 Ambulatory Orders











 Medication  Instructions  Recorded  Confirmed


 


Potassium Citrate [Potassium 60 meq PO BID 07/13/18 07/13/18





Citrate ER]   














- Allergies


Allergies/Adverse Reactions: 


 Allergies











Allergy/AdvReac Type Severity Reaction Status Date / Time


 


No Known Drug Allergies Allergy   Verified 06/19/18 10:44














Review of Systems





- Other Findings


Other Findings: 





A comprehensive review of systems was performed the pertinent positives and 

negatives are stated above in the HPI and the remainder of the review of 

systems is negative.





Exam





- Vital Signs


Reviewed Vital Signs: Yes


Vital Signs: 





 Vital Signs x48h











  Temp Pulse Resp BP Pulse Ox


 


 07/13/18 12:22   88  15  116/77  97


 


 07/13/18 10:36  36.4 C L  101 H  18  126/81 H  97














- Physical Exam


General Appearance: positive: Alert, Mild distress (In a lot of pain and barely 

able to lift arm to shake my hand)


Eyes Bilateral: positive: Normal inspection, PERRL, EOMI, No lid inflammation, 

Conjunctivae nml, No scleral icterus


ENT: positive: ENT inspection nml, Pharynx nml, Dry mucous membranes.  negative

: Purulent nasal drainage, Pharyngeal erythema, Oral lesions


Neck: positive: Nml inspection, Thyroid nml, No JVD, Trachea midline.  negative

: Thyromegaly, Lymphadenopathy (R), Lymphadenopathy (L), Stiff neck, Carotid 

bruit, Tracheal deviation


Respiratory: positive: Chest non-tender, No respiratory distress, Breath sounds 

nml.  negative: Wheezes, Rales, Rhonchi


Cardiovascular: positive: Regular rate & rhythm, No murmur, No gallop


Peripheral Pulses: positive: 2+


Abdomen: positive: Non-tender, No organomegaly, Nml bowel sounds, No distention


Back: positive: Nml inspection


Skin: positive: Color nml, No rash, Warm, Dry.  negative: Cyanosis, Diaphoresis

, Pallor, Skin rash


Extremities: positive: Non-tender, Nml appearance, Other (Difficulty lifting 

arms and legs)


Neurologic/Psychiatric: positive: Oriented x3, CN's nml (2-12), Motor nml, 

Sensation nml, Mood/affect nml





Conclusion/Plan





- Problem List


(1) Hypokalemia


Conclusion/Plan: 





Patient presents with severe hypokalemia with a potassium of less than 1.5.  

His symptoms on presentation or soreness of his upper and lower extremities and 

soreness all over his body.  The patient is barely able to lift up his arms or 

his legs.  He also has cramping in his legs.  Patient has been here multiple 

times with hypokalemia believed to be secondary to renal tubular acidosis type 

II in the setting of noncompliance with potassium.





Plan:


Replace potassium IV n.p.o.


Start spironolactone


Check potassium every 6 hours


Telemetry monitoring


Patient was counseled on need for compliance with medication to avoid recurrent 

hospitalizations


IV fluids








(2) Renal tubular acidosis type II


Conclusion/Plan: 


Patient has history of renal tubular acidosis type II and was placed on 

spironolactone by his nephrologist but has not been compliant with medication.  

Patient also on potassium replacement but has issues with compliance.  Although 

he states this time he was compliant with his medications.  He presents today 

with severe hypokalemia with potassium of less than 1.5.  Patient does have 

acidosis on his chemistry with a bicarb of 17.





Plan:


Give patient IV and p.o. potassium replacement


Monitor potassium every 6 hours


Start spironolactone 25 mg twice daily











(3) Hyperglycemia


Conclusion/Plan: 


Patient is hyperglycemic on presentation with a blood glucose of 190.  This 

appears likely to be a stress response as he has had elevated glucose in the 

past.  His hemoglobin A1c is only 5.0 so it does not appear that he is diabetic 

or prediabetic at this point.








(4) Depression with anxiety


Conclusion/Plan: 


Patient has a history of depression and anxiety.  Patient will be placed on 

Ativan 1 mg every 6 hours as needed.  Currently the patient appears to be stable








(5) Hyponatremia


Conclusion/Plan: 


The patient has hyponatremia with a sodium of 127 on presentation.  The patient 

appears to have hypovolemic hyponatremia.  The patient is considerably 

dehydrated he appears to have hemoconcentration of his CBC and is very dry on 

examination.


Patient will be given IV fluids and we will continue to monitor her sodium.








- Lab Results


Lab results reviewed: Yes


Fish Bones: 


 07/13/18 10:53





 07/13/18 10:53


Other Lab Results: 








 Laboratory Results











WBC  18.7 x10^3/uL (4.8-10.8)  H  07/13/18  10:53    


 


RBC  5.94 10^6/uL (4.70-6.10)   07/13/18  10:53    


 


Hgb  18.6 g/dL (14.0-18.0)  H  07/13/18  10:53    


 


Hct  52.4 % (42.0-52.0)  H  07/13/18  10:53    


 


MCV  88.4 fL (80.0-94.0)   07/13/18  10:53    


 


MCH  31.3 pg (27.0-31.0)  H  07/13/18  10:53    


 


MCHC  35.4 g/dL (32.0-36.0)   07/13/18  10:53    


 


RDW  13.1 % (12.0-15.0)   07/13/18  10:53    


 


Plt Count  474 10^3/uL (130-450)  H  07/13/18  10:53    


 


MPV  6.8 fL (7.4-11.4)  L  07/13/18  10:53    


 


Neut # (Auto)  15.7 10^3/uL (1.5-6.6)  H  07/13/18  10:53    


 


Lymph # (Auto)  1.5 10^3/uL (1.5-3.5)   07/13/18  10:53    


 


Mono # (Auto)  0.6 10^3/uL (0.0-1.0)   07/13/18  10:53    


 


Eos # (Auto)  0.8 10^3/uL (0.0-0.7)  H  07/13/18  10:53    


 


Baso # (Auto)  0.1 10^3/uL (0.0-0.1)   07/13/18  10:53    


 


Absolute Nucleated RBC  0.01 x10^3/uL  07/13/18  10:53    


 


Nucleated RBC %  0.1 /100WBC  07/13/18  10:53    


 


Manual Slide Review  Indicated   07/13/18  10:53    


 


WBC Morphology  NORMAL APPEARANCE  (NORMAL)   07/13/18  10:53    


 


Platelet Estimate  INCREASED (>450,000)  (NORMAL)   07/13/18  10:53    


 


Platelet Morphology  NORMAL APPEARANCE  (NORMAL)   07/13/18  10:53    


 


RBC Morph Micro Appear  NORMAL APPEARANCE  (NORMAL)   07/13/18  10:53    


 


Sodium  127 mmol/L (135-145)  L  07/13/18  10:53    


 


Potassium  < 1.5 mmol/L (3.5-5.0)  L*  07/13/18  10:53    


 


Chloride  97 mmol/L (101-111)  L  07/13/18  10:53    


 


Carbon Dioxide  17 mmol/L (21-32)  L  07/13/18  10:53    


 


Anion Gap  13.0  (6-13)   07/13/18  10:53    


 


BUN  12 mg/dL (6-20)   07/13/18  10:53    


 


Creatinine  1.2 mg/dL (0.6-1.2)   07/13/18  10:53    


 


Estimated GFR (MDRD)  71  (>89)  L  07/13/18  10:53    


 


Glucose  190 mg/dL ()  H  07/13/18  10:53    


 


Calcium  8.8 mg/dL (8.5-10.3)   07/13/18  10:53    


 


Total Bilirubin  0.7 mg/dL (0.2-1.0)   07/13/18  10:53    


 


AST  32 IU/L (10-42)   07/13/18  10:53    


 


ALT  22 IU/L (10-60)   07/13/18  10:53    


 


Alkaline Phosphatase  212 IU/L ()  H  07/13/18  10:53    


 


Total Protein  8.7 g/dL (6.7-8.2)  H  07/13/18  10:53    


 


Albumin  3.6 g/dL (3.2-5.5)   07/13/18  10:53    


 


Globulin  5.1 g/dL (2.1-4.2)  H  07/13/18  10:53    


 


Albumin/Globulin Ratio  0.7  (1.0-2.2)  L  07/13/18  10:53    


 


Lipase  67 U/L (22-51)  H  07/13/18  10:53    














- EKG Results


EKG Interpreted Independently: Yes





Core Measures





- Anticipated LOS


I expect patient to be DC'd or transferred within 96 hours.: Yes





- DVT/VTE - Prophylaxis


VTE/DVT Prophylaxis med ordered at admit?: Yes

## 2018-07-14 VITALS — DIASTOLIC BLOOD PRESSURE: 85 MMHG | SYSTOLIC BLOOD PRESSURE: 118 MMHG

## 2018-07-14 LAB
ANION GAP SERPL CALCULATED.4IONS-SCNC: 10 MMOL/L (ref 6–13)
ANION GAP SERPL CALCULATED.4IONS-SCNC: 8 MMOL/L (ref 6–13)
ANION GAP SERPL CALCULATED.4IONS-SCNC: 9 MMOL/L (ref 6–13)
BASOPHILS NFR BLD AUTO: 0.1 10^3/UL (ref 0–0.1)
BASOPHILS NFR BLD AUTO: 0.5 %
BUN SERPL-MCNC: 11 MG/DL (ref 6–20)
BUN SERPL-MCNC: 8 MG/DL (ref 6–20)
BUN SERPL-MCNC: 9 MG/DL (ref 6–20)
CALCIUM UR-MCNC: 7.4 MG/DL (ref 8.5–10.3)
CALCIUM UR-MCNC: 7.5 MG/DL (ref 8.5–10.3)
CALCIUM UR-MCNC: 7.8 MG/DL (ref 8.5–10.3)
CHLORIDE SERPL-SCNC: 100 MMOL/L (ref 101–111)
CHLORIDE SERPL-SCNC: 99 MMOL/L (ref 101–111)
CHLORIDE SERPL-SCNC: 99 MMOL/L (ref 101–111)
CO2 SERPL-SCNC: 25 MMOL/L (ref 21–32)
CO2 SERPL-SCNC: 26 MMOL/L (ref 21–32)
CO2 SERPL-SCNC: 28 MMOL/L (ref 21–32)
CREAT SERPLBLD-SCNC: 0.8 MG/DL (ref 0.6–1.2)
EOSINOPHIL # BLD AUTO: 0.5 10^3/UL (ref 0–0.7)
EOSINOPHIL NFR BLD AUTO: 3.7 %
ERYTHROCYTE [DISTWIDTH] IN BLOOD BY AUTOMATED COUNT: 13.4 % (ref 12–15)
GFRSERPLBLD MDRD-ARVRAT: 114 ML/MIN/{1.73_M2} (ref 89–?)
GLUCOSE SERPL-MCNC: 123 MG/DL (ref 70–100)
GLUCOSE SERPL-MCNC: 87 MG/DL (ref 70–100)
GLUCOSE SERPL-MCNC: 90 MG/DL (ref 70–100)
HGB UR QL STRIP: 15.4 G/DL (ref 14–18)
LYMPHOCYTES # SPEC AUTO: 2.8 10^3/UL (ref 1.5–3.5)
LYMPHOCYTES NFR BLD AUTO: 20 %
MAGNESIUM SERPL-MCNC: 2.1 MG/DL (ref 1.7–2.8)
MCH RBC QN AUTO: 30.7 PG (ref 27–31)
MCHC RBC AUTO-ENTMCNC: 35.1 G/DL (ref 32–36)
MCV RBC AUTO: 87.6 FL (ref 80–94)
MONOCYTES # BLD AUTO: 1.1 10^3/UL (ref 0–1)
MONOCYTES NFR BLD AUTO: 7.6 %
NEUTROPHILS # BLD AUTO: 9.6 10^3/UL (ref 1.5–6.6)
NEUTROPHILS # SNV AUTO: 14 X10^3/UL (ref 4.8–10.8)
NEUTROPHILS NFR BLD AUTO: 68.2 %
PDW BLD AUTO: 6.7 FL (ref 7.4–11.4)
PH BLDV: 7.44 [PH] (ref 7.31–7.41)
PH BLDV: 7.49 [PH] (ref 7.31–7.41)
PHOSPHATE BLD-MCNC: 1.2 MG/DL (ref 2.5–4.6)
PLATELET # BLD: 372 10^3/UL (ref 130–450)
RBC MAR: 5.03 10^6/UL (ref 4.7–6.1)
SODIUM SERPLBLD-SCNC: 134 MMOL/L (ref 135–145)
SODIUM SERPLBLD-SCNC: 135 MMOL/L (ref 135–145)
SODIUM SERPLBLD-SCNC: 135 MMOL/L (ref 135–145)

## 2018-07-14 RX ADMIN — SODIUM CHLORIDE, PRESERVATIVE FREE SCH: 5 INJECTION INTRAVENOUS at 02:17

## 2018-07-14 RX ADMIN — SODIUM CHLORIDE SCH MLS/HR: 9 INJECTION, SOLUTION INTRAVENOUS at 02:16

## 2018-07-14 RX ADMIN — SODIUM CHLORIDE, PRESERVATIVE FREE SCH ML: 5 INJECTION INTRAVENOUS at 08:03

## 2018-07-14 RX ADMIN — SODIUM CHLORIDE SCH MLS/HR: 9 INJECTION, SOLUTION INTRAVENOUS at 06:09

## 2018-07-14 RX ADMIN — SPIRONOLACTONE SCH MG: 25 TABLET, FILM COATED ORAL at 08:03

## 2018-07-14 RX ADMIN — POTASSIUM CITRATE AND CITRIC ACID SCH EACH: 3.3; 1.002 GRANULE, FOR SOLUTION ORAL at 08:03

## 2018-07-14 RX ADMIN — OXYCODONE PRN MG: 5 TABLET ORAL at 09:07

## 2018-07-14 RX ADMIN — NICOTINE SCH: 14 PATCH TRANSDERMAL at 14:17
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
## 2018-07-14 NOTE — DISCHARGE PLAN
Discharge Plan


Disposition: 01 Home, Self Care


Condition: Fair


Prescriptions: 


Potassium Chloride [Klor-Con M20] 120 meq PO QID #120 tab.er.prt


Diet: Regular


Activity Restrictions: Activity as Tolerated


Shower Restrictions: No


Driving Restrictions: No


Weight Bearing: Full Weight


Additional Instructions or Follow Up instructions: 


You presented to the emergency department with muscle soreness and weakness and 

were found to have a potassium of less than 1.5.  We have given you a potassium 

through IV and oral route over the course of the night and her potassium is now 

up to 2.2 although normally this is not an adequate potassium level it appears 

that you live at a lower potassium than normal and therefore given that your 

symptoms have completely resolved and you can take potassium orally at home we 

will be discharging her home.  Please make sure to  your prescription 

for potassium tablets and please be compliant with taking them 4 times a day.


No Smoking: If you smoke, Please STOP!  Call [phone] for help.


Follow-up with: 


Tori Kaur PA-C [Primary Care Provider] -
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
## 2018-12-14 ENCOUNTER — HOSPITAL ENCOUNTER (OUTPATIENT)
Dept: HOSPITAL 76 - LAB | Age: 28
Discharge: HOME | End: 2018-12-14
Attending: INTERNAL MEDICINE
Payer: MEDICAID

## 2018-12-14 DIAGNOSIS — D63.1: ICD-10-CM

## 2018-12-14 DIAGNOSIS — D70.9: ICD-10-CM

## 2018-12-14 DIAGNOSIS — N05.9: Primary | ICD-10-CM

## 2018-12-14 DIAGNOSIS — E83.40: ICD-10-CM

## 2018-12-14 LAB
ANION GAP SERPL CALCULATED.4IONS-SCNC: 10 MMOL/L (ref 6–13)
BASOPHILS NFR BLD AUTO: 0 10^3/UL (ref 0–0.1)
BASOPHILS NFR BLD AUTO: 0.3 %
BUN SERPL-MCNC: 12 MG/DL (ref 6–20)
CALCIUM UR-MCNC: 9.5 MG/DL (ref 8.5–10.3)
CHLORIDE SERPL-SCNC: 101 MMOL/L (ref 101–111)
CO2 SERPL-SCNC: 25 MMOL/L (ref 21–32)
CREAT SERPLBLD-SCNC: 1 MG/DL (ref 0.6–1.2)
EOSINOPHIL # BLD AUTO: 0.4 10^3/UL (ref 0–0.7)
EOSINOPHIL NFR BLD AUTO: 3.1 %
ERYTHROCYTE [DISTWIDTH] IN BLOOD BY AUTOMATED COUNT: 14.1 % (ref 12–15)
GFRSERPLBLD MDRD-ARVRAT: 88 ML/MIN/{1.73_M2} (ref 89–?)
GLUCOSE SERPL-MCNC: 95 MG/DL (ref 70–100)
HGB UR QL STRIP: 18.4 G/DL (ref 14–18)
LYMPHOCYTES # SPEC AUTO: 1.8 10^3/UL (ref 1.5–3.5)
LYMPHOCYTES NFR BLD AUTO: 13.6 %
MAGNESIUM SERPL-MCNC: 2.2 MG/DL (ref 1.7–2.8)
MCH RBC QN AUTO: 32.4 PG (ref 27–31)
MCHC RBC AUTO-ENTMCNC: 35.8 G/DL (ref 32–36)
MCV RBC AUTO: 90.7 FL (ref 80–94)
MONOCYTES # BLD AUTO: 0.8 10^3/UL (ref 0–1)
MONOCYTES NFR BLD AUTO: 5.8 %
NEUTROPHILS # BLD AUTO: 10.1 10^3/UL (ref 1.5–6.6)
NEUTROPHILS # SNV AUTO: 13.1 X10^3/UL (ref 4.8–10.8)
NEUTROPHILS NFR BLD AUTO: 77.2 %
PDW BLD AUTO: 6.8 FL (ref 7.4–11.4)
PLATELET # BLD: 395 10^3/UL (ref 130–450)
RBC MAR: 5.67 10^6/UL (ref 4.7–6.1)
SODIUM SERPLBLD-SCNC: 136 MMOL/L (ref 135–145)

## 2018-12-14 PROCEDURE — 85025 COMPLETE CBC W/AUTO DIFF WBC: CPT

## 2018-12-14 PROCEDURE — 80048 BASIC METABOLIC PNL TOTAL CA: CPT

## 2018-12-14 PROCEDURE — 83735 ASSAY OF MAGNESIUM: CPT

## 2018-12-14 PROCEDURE — 36415 COLL VENOUS BLD VENIPUNCTURE: CPT

## 2019-09-02 ENCOUNTER — HOSPITAL ENCOUNTER (OUTPATIENT)
Dept: HOSPITAL 76 - EMS | Age: 29
Discharge: ANOTHER HOSPITAL | End: 2019-09-02
Attending: SURGERY
Payer: MEDICAID

## 2019-09-02 ENCOUNTER — HOSPITAL ENCOUNTER (EMERGENCY)
Dept: HOSPITAL 76 - ED | Age: 29
Discharge: ANOTHER HOSPITAL | End: 2019-09-02
Payer: MEDICAID

## 2019-09-02 VITALS — DIASTOLIC BLOOD PRESSURE: 76 MMHG | SYSTOLIC BLOOD PRESSURE: 142 MMHG

## 2019-09-02 DIAGNOSIS — F17.200: ICD-10-CM

## 2019-09-02 DIAGNOSIS — T50.3X6A: ICD-10-CM

## 2019-09-02 DIAGNOSIS — J90: ICD-10-CM

## 2019-09-02 DIAGNOSIS — R06.00: Primary | ICD-10-CM

## 2019-09-02 DIAGNOSIS — R25.9: ICD-10-CM

## 2019-09-02 DIAGNOSIS — R00.8: ICD-10-CM

## 2019-09-02 DIAGNOSIS — N25.89: ICD-10-CM

## 2019-09-02 DIAGNOSIS — J96.01: ICD-10-CM

## 2019-09-02 DIAGNOSIS — G93.6: ICD-10-CM

## 2019-09-02 DIAGNOSIS — R07.89: ICD-10-CM

## 2019-09-02 DIAGNOSIS — E87.6: Primary | ICD-10-CM

## 2019-09-02 DIAGNOSIS — D47.3: ICD-10-CM

## 2019-09-02 LAB
ALBUMIN DIAFP-MCNC: 4.6 G/DL (ref 3.2–5.5)
ALBUMIN/GLOB SERPL: 0.9 {RATIO} (ref 1–2.2)
ALP SERPL-CCNC: 260 IU/L (ref 42–121)
ALT SERPL W P-5'-P-CCNC: 36 IU/L (ref 10–60)
AMPHET UR QL SCN: NEGATIVE
ANION GAP SERPL CALCULATED.4IONS-SCNC: 15 MMOL/L (ref 6–13)
APTT PPP: 29.9 SECS (ref 24.9–33.3)
ARTERIAL PATENCY WRIST A: POSITIVE
ARTERIAL PATENCY WRIST A: POSITIVE
AST SERPL W P-5'-P-CCNC: 31 IU/L (ref 10–42)
BASE EXCESS BLDMV CALC-SCNC: -10.7 MMOL/L (ref -2–3)
BASE EXCESS BLDMV CALC-SCNC: -12.6 MMOL/L (ref -2–3)
BASOPHILS # BLD MANUAL: 0 10^3/UL (ref 0–0.1)
BASOPHILS NFR BLD AUTO: 0.1 %
BENZODIAZ UR QL SCN: NEGATIVE
BILIRUB BLD-MCNC: 0.9 MG/DL (ref 0.2–1)
BUN SERPL-MCNC: 36 MG/DL (ref 6–20)
CALCIUM UR-MCNC: 9 MG/DL (ref 8.5–10.3)
CHLORIDE SERPL-SCNC: 96 MMOL/L (ref 101–111)
CLARITY UR REFRACT.AUTO: CLEAR
CO2 BLDA CALC-SCNC: 19.1 MMOL/L (ref 21–29)
CO2 BLDA CALC-SCNC: 24.2 MMOL/L (ref 21–29)
CO2 SERPL-SCNC: 23 MMOL/L (ref 21–32)
COCAINE UR-SCNC: NEGATIVE UMOL/L
CREAT SERPLBLD-SCNC: 2 MG/DL (ref 0.6–1.2)
DEPRECATED HCO3 PLAS-SCNC: 17.6 MMOL/L (ref 22–26)
DEPRECATED HCO3 PLAS-SCNC: 21.6 MMOL/L (ref 22–26)
EOSINOPHIL # BLD MANUAL: 0 10^3/UL (ref 0–0.7)
EOSINOPHIL NFR BLD AUTO: 0.1 %
ERYTHROCYTE [DISTWIDTH] IN BLOOD BY AUTOMATED COUNT: 16.1 % (ref 12–15)
FIO2: 100
GFRSERPLBLD MDRD-ARVRAT: 39 ML/MIN/{1.73_M2} (ref 89–?)
GLOBULIN SER-MCNC: 5.2 G/DL (ref 2.1–4.2)
GLUCOSE SERPL-MCNC: 339 MG/DL (ref 70–100)
GLUCOSE UR QL STRIP.AUTO: NEGATIVE MG/DL
HGB UR QL STRIP: 21.3 G/DL (ref 14–18)
INR PPP: 1.3 (ref 0.8–1.2)
KETONES UR QL STRIP.AUTO: (no result) MG/DL
LIPASE SERPL-CCNC: 45 U/L (ref 22–51)
LYMPH ABN NFR BLD MANUAL: 0 %
LYMPHOBLASTS # BLD: 2 %
LYMPHOCYTES # BLD MANUAL: 1 10^3/UL (ref 1.5–3.5)
LYMPHOCYTES NFR BLD AUTO: 3.6 %
MAGNESIUM SERPL-MCNC: 3 MG/DL (ref 1.7–2.8)
MANUAL DIF COMMENT BLD-IMP: (no result)
MCH RBC QN AUTO: 31 PG (ref 27–31)
MCHC RBC AUTO-ENTMCNC: 37.4 G/DL (ref 32–36)
MCV RBC AUTO: 83 FL (ref 80–94)
METHADONE UR QL SCN: NEGATIVE
METHAMPHET UR QL SCN: NEGATIVE
MONOCYTES # BLD MANUAL: 2.4 10^3/UL (ref 0–1)
MONOCYTES NFR BLD AUTO: 6.6 %
NEUTROPHILS # SNV AUTO: 48.6 X10^3/UL (ref 4.8–10.8)
NEUTROPHILS NFR BLD AUTO: 86.3 %
NEUTS BAND NFR BLD: 0 %
NITRITE UR QL STRIP.AUTO: NEGATIVE
OPIATES UR QL SCN: NEGATIVE
PCO2 TEMP ADJ BLDCOA: 47 MMHG (ref 34–45)
PCO2 TEMP ADJ BLDCOA: 86 MMHG (ref 34–45)
PDW BLD AUTO: 9.1 FL (ref 7.4–11.4)
PH TEMP ADJ BLDA: 7.02 [PH] (ref 7.35–7.45)
PH TEMP ADJ BLDA: 7.19 [PH] (ref 7.35–7.45)
PH UR STRIP.AUTO: 6 PH (ref 5–7.5)
PHOSPHATE BLD-MCNC: 3.6 MG/DL (ref 2.5–4.6)
PLAT MORPH BLD: (no result)
PLATELET # BLD: 572 10^3/UL (ref 130–450)
PLATELET BLD QL SMEAR: (no result)
PO2 TEMP ADJ BLDCOA: 80 MMHG (ref 80–100)
PO2 TEMP ADJ BLDCOA: 86 MMHG (ref 80–100)
PROT SPEC-MCNC: 9.8 G/DL (ref 6.7–8.2)
PROT UR STRIP.AUTO-MCNC: >=300 MG/DL
PROTHROM ACT/NOR PPP: 14.2 SECS (ref 9.9–12.6)
RBC # UR STRIP.AUTO: (no result) /UL
RBC # URNS HPF: (no result) /HPF (ref 0–5)
RBC MAR: 6.87 10^6/UL (ref 4.7–6.1)
RBC MORPH BLD: (no result)
SAO2 % BLDA FROM PO2: 92 % (ref 94–98)
SAO2 % BLDA FROM PO2: 95 % (ref 94–98)
SODIUM SERPLBLD-SCNC: 134 MMOL/L (ref 135–145)
SP GR UR STRIP.AUTO: 1.01 (ref 1–1.03)
SQUAMOUS URNS QL MICRO: (no result)
UROBILINOGEN UR QL STRIP.AUTO: (no result) E.U./DL
UROBILINOGEN UR STRIP.AUTO-MCNC: NEGATIVE MG/DL
VOLATILE DRUGS POS SERPL SCN: (no result)

## 2019-09-02 PROCEDURE — 81001 URINALYSIS AUTO W/SCOPE: CPT

## 2019-09-02 PROCEDURE — 70450 CT HEAD/BRAIN W/O DYE: CPT

## 2019-09-02 PROCEDURE — 85025 COMPLETE CBC W/AUTO DIFF WBC: CPT

## 2019-09-02 PROCEDURE — 94770: CPT

## 2019-09-02 PROCEDURE — 83605 ASSAY OF LACTIC ACID: CPT

## 2019-09-02 PROCEDURE — 85730 THROMBOPLASTIN TIME PARTIAL: CPT

## 2019-09-02 PROCEDURE — 81003 URINALYSIS AUTO W/O SCOPE: CPT

## 2019-09-02 PROCEDURE — 99291 CRITICAL CARE FIRST HOUR: CPT

## 2019-09-02 PROCEDURE — 96368 THER/DIAG CONCURRENT INF: CPT

## 2019-09-02 PROCEDURE — 83735 ASSAY OF MAGNESIUM: CPT

## 2019-09-02 PROCEDURE — 71045 X-RAY EXAM CHEST 1 VIEW: CPT

## 2019-09-02 PROCEDURE — 80053 COMPREHEN METABOLIC PANEL: CPT

## 2019-09-02 PROCEDURE — 31500 INSERT EMERGENCY AIRWAY: CPT

## 2019-09-02 PROCEDURE — 36600 WITHDRAWAL OF ARTERIAL BLOOD: CPT

## 2019-09-02 PROCEDURE — 51702 INSERT TEMP BLADDER CATH: CPT

## 2019-09-02 PROCEDURE — 96365 THER/PROPH/DIAG IV INF INIT: CPT

## 2019-09-02 PROCEDURE — 82140 ASSAY OF AMMONIA: CPT

## 2019-09-02 PROCEDURE — 80306 DRUG TEST PRSMV INSTRMNT: CPT

## 2019-09-02 PROCEDURE — 93005 ELECTROCARDIOGRAM TRACING: CPT

## 2019-09-02 PROCEDURE — 83690 ASSAY OF LIPASE: CPT

## 2019-09-02 PROCEDURE — 85610 PROTHROMBIN TIME: CPT

## 2019-09-02 PROCEDURE — 99285 EMERGENCY DEPT VISIT HI MDM: CPT

## 2019-09-02 PROCEDURE — 96366 THER/PROPH/DIAG IV INF ADDON: CPT

## 2019-09-02 PROCEDURE — 36415 COLL VENOUS BLD VENIPUNCTURE: CPT

## 2019-09-02 PROCEDURE — 96375 TX/PRO/DX INJ NEW DRUG ADDON: CPT

## 2019-09-02 PROCEDURE — 87040 BLOOD CULTURE FOR BACTERIA: CPT

## 2019-09-02 PROCEDURE — 82803 BLOOD GASES ANY COMBINATION: CPT

## 2019-09-02 PROCEDURE — 84100 ASSAY OF PHOSPHORUS: CPT

## 2019-09-02 NOTE — XRAY REPORT
Reason:  post-intubation

Procedure Date:  09/02/2019   

Accession Number:  [id number] / [id number]                    

Procedure:  XR  - Chest for Line Placement CPT Code:  

 

FULL RESULT:

 

 

EXAM:

CHEST RADIOGRAPHY

 

EXAM DATE: 9/2/2019 03:27 AM.

 

CLINICAL HISTORY: Post-intubation.

 

COMPARISON: CHEST 1 VIEW 09/02/2019 2:31 AM.

 

TECHNIQUE: 1 view.

 

FINDINGS:

Lungs/Pleura: Stable effusions and basilar airspace disease.

 

Mediastinum: Within exam limitations, the cardiomediastinal contour is 

normal.

 

Other: Endotracheal tube terminates 6 cm above the mustapha. Enteric tube 

terminates below the level of the diaphragm.

 

IMPRESSION: Endotracheal tube terminating 6 cm above the mustapha. Stable 

effusions and basilar airspace disease.

 

[facility]

## 2019-09-02 NOTE — XRAY REPORT
Reason:  chest pain

Procedure Date:  09/02/2019   

Accession Number:  [id number] / [id number]                    

Procedure:  XR  - Chest 1 View X-Ray CPT Code:  25206

 

FULL RESULT:

 

 

EXAM:

CHEST RADIOGRAPHY

 

EXAM DATE: 9/2/2019 02:50 AM.

 

CLINICAL HISTORY: Chest pain.

 

COMPARISON: CHEST 2 VIEW PA/LAT 01/10/2017 9:48 AM.

 

TECHNIQUE: 1 view.

 

FINDINGS:

Lungs/Pleura: Bilateral effusions and basilar airspace disease. No 

pneumothorax.

 

Mediastinum: Within exam limitations, the cardiomediastinal contour is 

normal.

 

Other: None.

 

IMPRESSION: Bilateral effusions and basilar airspace disease.

 

[facility]

## 2019-09-02 NOTE — CT REPORT
Reason:  AMS

Procedure Date:  09/02/2019   

Accession Number:  [id number] / [id number]                    

Procedure:  CT  - HEAD WO CPT Code:  

 

FULL RESULT:

 

 

EXAM:

CT HEAD

 

EXAM DATE: 9/2/2019 02:34 AM.

 

CLINICAL HISTORY: AMS.

 

COMPARISON: HEAD W/O 12/24/2013 3:34 PM.

 

TECHNIQUE: Multiaxial CT images were obtained from the foramen magnum to 

the vertex. Reformats: Sagittal and coronal. IV contrast: None.

 

In accordance with CT protocol optimization, one or more of the following 

dose reduction techniques were utilized for this exam: automated exposure 

control, adjustment of mA and/or KV based on patient size, or use of 

iterative reconstructive technique.

 

FINDINGS:

Parenchyma: Diffuse cerebral edema, new from previous. No midline shift. 

Visualization at the skull base and posterior fossa is limited by motion 

and streak artifact. No definite intraparenchymal hemorrhage.

 

Extraaxial Spaces: Effaced by edema. No subdural or epidural collections 

identified.

 

Ventricles: Compressed by edema.

 

Sinuses and Orbits: Imaged paranasal sinuses, orbits, and mastoids show 

no significant abnormality.

 

Bones: No evidence of fracture or calvarial defect.

 

Other: None.

IMPRESSION: Diffuse cerebral edema, new from previous. No definite acute 

hemorrhage identified.

 

[facility]

## 2019-09-02 NOTE — ED PHYSICIAN DOCUMENTATION
History of Present Illness





- Stated complaint


Stated Complaint: HYPOKALEMIA





- Chief complaint


Chief Complaint: General





- History obtained from


History obtained from: Family, EMS





- History of Present Illness


Timing: Today


Improved by: unknown


Worsened by: unknown





- Additonal information


Additional information: 





BIBA for generalized weakness. HPI is from EMS and family (in ED at bedside). 

Patient is unable to provide HPI due to AMS. 


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 

inpatient visits. Per family , patient was c/o weakness today which he 

recognized as manifestation of hypokalemia; he reportedly then admitted to 

missing doses of his prescribed potassium. 


Medics report that patient had pulse ox of 86% on room air, improved to mid 90s 

with 15 L/min NRB





Review of Systems


Unable to obtain: AMS


Neurologic: reports: Generalized weakness





PD PAST MEDICAL HISTORY





- Past Medical History


Cardiovascular: None


Respiratory: None


Neuro: None


Endocrine/Autoimmune: None


GI: None


GYN: Other (Renal tubular acidosis)


: None


HEENT: None


Psych: Depression, Anxiety


Musculoskeletal: None


Derm: None, Other





- Past Surgical History


Past Surgical History: Yes





- Present Medications


Home Medications: 


                                Ambulatory Orders











 Medication  Instructions  Recorded  Confirmed


 


Potassium Chloride [Klor-Con M20] 120 meq PO QID #120 tab.er.prt 07/14/18 














- Allergies


Allergies/Adverse Reactions: 


                                    Allergies











Allergy/AdvReac Type Severity Reaction Status Date / Time


 


No Known Drug Allergies Allergy   Verified 06/19/18 10:44














- Social History


Does the pt smoke?: Yes


Smoking Status: Current every day smoker


Does the pt drink ETOH?: No


Does the pt have substance abuse?: Yes





- Immunizations


Immunizations are current?: Yes





- POLST


Patient has POLST: No


POLST Status: Full Code





PD ED PE NORMAL





- Vitals


Vital signs reviewed: Yes (pulse of 40 is incorrect; he was in bigeminy and rate

was 80 )





- General


General: Well developed/nourished, Other (maintains airway. AMS; does not follow

most commands (will briefly track with eyes on EOMI testing), nonverbal on my 

HPI (ED RN says patient was mumbling unintelligibly when asked some HPI 

questions))





- HEENT


HEENT: Atraumatic, PERRL, Other (dry mucous membranes)





- Neck


Neck: Supple, no meningeal sign





- Cardiac


Cardiac: No murmur





- Abdomen


Abdomen: Soft, Non tender, Non distended





- Derm


Derm: Normal color, Warm and dry





- Extremities


Extremities: No edema





- Neuro


Eye Opening: Spontaneous


Motor: Localizes to Pain


Verbal: None


GCS Score: 10





PD ED PE EXPANDED





- Cardiac


Cardiac: Regularly irregular





- Respiratory


Respiratory: Decreased breath sounds (bilateral bases), Other (tachypneic but 

shallow breaths)





Results





- Vitals


Vitals: 


                               Vital Signs - 24 hr











  09/02/19 09/02/19 09/02/19





  01:04 01:15 01:32


 


Temperature 36.8 C 36.4 C L 


 


Heart Rate 40 L  88


 


Respiratory 40 H  30 H





Rate   


 


Blood Pressure 159/101 H  168/96 H


 


O2 Saturation 90 L  95














  09/02/19 09/02/19 09/02/19





  02:15 03:09 03:16


 


Temperature   


 


Heart Rate 73 78 90


 


Respiratory 35 H 30 H 13





Rate   


 


Blood Pressure 159/86 H 144/85 H 125/84 H


 


O2 Saturation 96 94 98














  09/02/19 09/02/19 09/02/19





  03:20 03:25 03:30


 


Temperature   


 


Heart Rate 90 86 91


 


Respiratory 13 18 18





Rate   


 


Blood Pressure 121/76 132/88 H 143/84 H


 


O2 Saturation 98 97 98














  09/02/19 09/02/19 09/02/19





  03:35 03:37 04:02


 


Temperature   36.2 C L


 


Heart Rate 85 89 94


 


Respiratory 18  





Rate   


 


Blood Pressure 137/78 H  152/79 H


 


O2 Saturation 100  97














  09/02/19 09/02/19 09/02/19





  04:18 04:26 05:15


 


Temperature 36.3 C L  


 


Heart Rate 92 91 85


 


Respiratory 18 18 17





Rate   


 


Blood Pressure 113/69 106/68 142/76 H


 


O2 Saturation 95 95 98














  09/02/19





  05:24


 


Temperature 


 


Heart Rate 85


 


Respiratory 





Rate 


 


Blood Pressure 


 


O2 Saturation 








                                     Oxygen











O2 Source                      Mechanical ventilator


 


Oxygen Flow Rate               15

















- EKG (time done)


  ** No standard instances


Rate: Rate (enter#)


Rhythm: Sinus tachycardia (100)


Axis: Normal


QRS: Normal


Ischemia: Non specific changes


Other comments: Other comments (ventricular bigeminy)





- Labs


Labs: 


                                Laboratory Tests











  09/02/19 09/02/19 09/02/19





  01:23 01:23 01:23


 


WBC  48.6 H*  


 


RBC  6.87 H  


 


Hgb  21.3 H  


 


Hct  57.0 H  


 


MCV  83.0  


 


MCH  31.0  


 


MCHC  37.4 H  


 


RDW  16.1 H  


 


Plt Count  572 H  


 


MPV  9.1  


 


Neut # (Auto)  Not Reportable  


 


Lymph # (Auto)  Not Reportable  


 


Mono # (Auto)  Not Reportable  


 


Eos # (Auto)  Not Reportable  


 


Baso # (Auto)  Not Reportable  


 


Absolute Nucleated RBC  Not Reportable  


 


Total Counted  100  


 


Band Neuts % (Manual)  0  


 


Abnorm Lymph % (Manual)  0  


 


Nucleated RBC %  Not Reportable  


 


Neutrophils # (Manual)  45.2 H  


 


Lymphocytes # (Manual)  1.0 L  


 


Monocytes # (Manual)  2.4 H  


 


Eosinophils # (Manual)  0.0  


 


Basophils # (Manual)  0.0  


 


Differential Comment  MANUAL DIFFERENTIAL  


 


WBC Morphology  NORMAL APPEARANCE  


 


Platelet Estimate  INCREASED (>450,000)  


 


Platelet Morphology  NORMAL APPEARANCE  


 


RBC Morph Micro Appear  NORMAL APPEARANCE  


 


PT   


 


INR   


 


APTT   


 


Bld Gas Analysis Time   


 


Sample Site   


 


ABG pH   


 


ABG pCO2   


 


ABG pO2   


 


ABG HCO3   


 


ABG Total CO2   


 


ABG O2 Saturation   


 


ABG Base Excess   


 


Stas Test   


 


Respiration Rate   


 


O2 Delivery Device   


 


O2 Liters/Min   


 


Vent Mode   


 


FiO2   


 


Tidal Volume   


 


PEEP   


 


Pressure Support Vent   


 


Sodium   134 L 


 


Potassium   < 1.5 L* 


 


Chloride   96 L 


 


Carbon Dioxide   23 


 


Anion Gap   15.0 H 


 


BUN   36 H 


 


Creatinine   2.0 H 


 


Estimated GFR (MDRD)   39 L 


 


Glucose   339 H 


 


Lactic Acid   


 


Calcium   9.0 


 


Phosphorus    3.6


 


Magnesium    3.0 H


 


Total Bilirubin   0.9 


 


AST   31 


 


ALT   36 


 


Alkaline Phosphatase   260 H 


 


Ammonia   


 


Total Protein   9.8 H 


 


Albumin   4.6 


 


Globulin   5.2 H 


 


Albumin/Globulin Ratio   0.9 L 


 


Lipase   45 


 


Urine Color   


 


Urine Clarity   


 


Urine pH   


 


Ur Specific Gravity   


 


Urine Protein   


 


Urine Glucose (UA)   


 


Urine Ketones   


 


Urine Occult Blood   


 


Urine Nitrite   


 


Urine Bilirubin   


 


Urine Urobilinogen   


 


Ur Leukocyte Esterase   


 


Urine RBC   


 


Urine WBC   


 


Ur Squamous Epith Cells   


 


Urine Bacteria   


 


Ur Microscopic Review   


 


Urine Opiates Screen   


 


Ur Oxycodone Screen   


 


Urine Methadone Screen   


 


Ur Propoxyphene Screen   


 


Ur Barbiturates Screen   


 


Ur Tricyclics Screen   


 


Ur Phencyclidine Scrn   


 


Ur Amphetamine Screen   


 


U Methamphetamines Scrn   


 


U Benzodiazepines Scrn   


 


Urine Cocaine Screen   


 


U Cannabinoids Screen   














  09/02/19 09/02/19 09/02/19





  01:23 01:32 02:38


 


WBC   


 


RBC   


 


Hgb   


 


Hct   


 


MCV   


 


MCH   


 


MCHC   


 


RDW   


 


Plt Count   


 


MPV   


 


Neut # (Auto)   


 


Lymph # (Auto)   


 


Mono # (Auto)   


 


Eos # (Auto)   


 


Baso # (Auto)   


 


Absolute Nucleated RBC   


 


Total Counted   


 


Band Neuts % (Manual)   


 


Abnorm Lymph % (Manual)   


 


Nucleated RBC %   


 


Neutrophils # (Manual)   


 


Lymphocytes # (Manual)   


 


Monocytes # (Manual)   


 


Eosinophils # (Manual)   


 


Basophils # (Manual)   


 


Differential Comment   


 


WBC Morphology   


 


Platelet Estimate   


 


Platelet Morphology   


 


RBC Morph Micro Appear   


 


PT  14.2 H  


 


INR  1.3 H  


 


APTT  29.9  


 


Bld Gas Analysis Time    0247


 


Sample Site    RIGHT RADIAL


 


ABG pH    7.02 L*


 


ABG pCO2    86 H*


 


ABG pO2    80


 


ABG HCO3    21.6 L


 


ABG Total CO2    24.2


 


ABG O2 Saturation    92 L


 


ABG Base Excess    -12.6 L


 


Stas Test    POSITIVE


 


Respiration Rate   


 


O2 Delivery Device    NON REBREATHER MASK


 


O2 Liters/Min    15.00


 


Vent Mode   


 


FiO2   


 


Tidal Volume   


 


PEEP   


 


Pressure Support Vent   


 


Sodium   


 


Potassium   


 


Chloride   


 


Carbon Dioxide   


 


Anion Gap   


 


BUN   


 


Creatinine   


 


Estimated GFR (MDRD)   


 


Glucose   


 


Lactic Acid   


 


Calcium   


 


Phosphorus   


 


Magnesium   


 


Total Bilirubin   


 


AST   


 


ALT   


 


Alkaline Phosphatase   


 


Ammonia   


 


Total Protein   


 


Albumin   


 


Globulin   


 


Albumin/Globulin Ratio   


 


Lipase   


 


Urine Color   YELLOW 


 


Urine Clarity   CLEAR 


 


Urine pH   6.0 


 


Ur Specific Gravity   1.015 


 


Urine Protein   >=300 H 


 


Urine Glucose (UA)   NEGATIVE 


 


Urine Ketones   TRACE 


 


Urine Occult Blood   LARGE H 


 


Urine Nitrite   NEGATIVE 


 


Urine Bilirubin   NEGATIVE 


 


Urine Urobilinogen   1 (NORMAL) 


 


Ur Leukocyte Esterase   SMALL H 


 


Urine RBC   0-5 


 


Urine WBC   11-25 H 


 


Ur Squamous Epith Cells   FEW Squamous 


 


Urine Bacteria   Moderate H 


 


Ur Microscopic Review   INDICATED 


 


Urine Opiates Screen   NEGATIVE 


 


Ur Oxycodone Screen   NEGATIVE 


 


Urine Methadone Screen   NEGATIVE 


 


Ur Propoxyphene Screen   NEGATIVE 


 


Ur Barbiturates Screen   NEGATIVE 


 


Ur Tricyclics Screen   NEGATIVE 


 


Ur Phencyclidine Scrn   NEGATIVE 


 


Ur Amphetamine Screen   NEGATIVE 


 


U Methamphetamines Scrn   NEGATIVE 


 


U Benzodiazepines Scrn   NEGATIVE 


 


Urine Cocaine Screen   NEGATIVE 


 


U Cannabinoids Screen   POSITIVE H 














  09/02/19 09/02/19 09/02/19





  04:07 04:16 05:05


 


WBC   


 


RBC   


 


Hgb   


 


Hct   


 


MCV   


 


MCH   


 


MCHC   


 


RDW   


 


Plt Count   


 


MPV   


 


Neut # (Auto)   


 


Lymph # (Auto)   


 


Mono # (Auto)   


 


Eos # (Auto)   


 


Baso # (Auto)   


 


Absolute Nucleated RBC   


 


Total Counted   


 


Band Neuts % (Manual)   


 


Abnorm Lymph % (Manual)   


 


Nucleated RBC %   


 


Neutrophils # (Manual)   


 


Lymphocytes # (Manual)   


 


Monocytes # (Manual)   


 


Eosinophils # (Manual)   


 


Basophils # (Manual)   


 


Differential Comment   


 


WBC Morphology   


 


Platelet Estimate   


 


Platelet Morphology   


 


RBC Morph Micro Appear   


 


PT   


 


INR   


 


APTT   


 


Bld Gas Analysis Time    0520


 


Sample Site    RIGHT RADIAL


 


ABG pH    7.19 L*


 


ABG pCO2    47 H


 


ABG pO2    86


 


ABG HCO3    17.6 L


 


ABG Total CO2    19.1 L


 


ABG O2 Saturation    95


 


ABG Base Excess    -10.7 L


 


Stas Test    POSITIVE


 


Respiration Rate    18


 


O2 Delivery Device    VENTILATOR


 


O2 Liters/Min   


 


Vent Mode    SIMV


 


FiO2    100.00


 


Tidal Volume    540


 


PEEP    5


 


Pressure Support Vent    10


 


Sodium   


 


Potassium   


 


Chloride   


 


Carbon Dioxide   


 


Anion Gap   


 


BUN   


 


Creatinine   


 


Estimated GFR (MDRD)   


 


Glucose   


 


Lactic Acid  2.9 H  


 


Calcium   


 


Phosphorus   


 


Magnesium   


 


Total Bilirubin   


 


AST   


 


ALT   


 


Alkaline Phosphatase   


 


Ammonia   117.6 H* 


 


Total Protein   


 


Albumin   


 


Globulin   


 


Albumin/Globulin Ratio   


 


Lipase   


 


Urine Color   


 


Urine Clarity   


 


Urine pH   


 


Ur Specific Gravity   


 


Urine Protein   


 


Urine Glucose (UA)   


 


Urine Ketones   


 


Urine Occult Blood   


 


Urine Nitrite   


 


Urine Bilirubin   


 


Urine Urobilinogen   


 


Ur Leukocyte Esterase   


 


Urine RBC   


 


Urine WBC   


 


Ur Squamous Epith Cells   


 


Urine Bacteria   


 


Ur Microscopic Review   


 


Urine Opiates Screen   


 


Ur Oxycodone Screen   


 


Urine Methadone Screen   


 


Ur Propoxyphene Screen   


 


Ur Barbiturates Screen   


 


Ur Tricyclics Screen   


 


Ur Phencyclidine Scrn   


 


Ur Amphetamine Screen   


 


U Methamphetamines Scrn   


 


U Benzodiazepines Scrn   


 


Urine Cocaine Screen   


 


U Cannabinoids Screen   














- Rads (name of study)


  ** chest xray


Radiology: Prelim report reviewed, See rad report





  ** CT head


Radiology: Prelim report reviewed, See rad report





  ** cxr (post-intubation)


Radiology: Prelim report reviewed, See rad report





Procedures





- Intubation


Provider: Emergency physician


Medications: Etomidate, Succinylcholine


Blade: Glidescope


Tube: Size-enter number (8.0), Cuffed, Marked at lips-enter cm (23)


Route: Oral


Confirmation: Direct visualization (via glidescope), Bilateral breath sounds, No

abdominal breath sound, End tidal CO2, Pulse ox, Chest xray


Complications: No compications





PD MEDICAL DECISION MAKING





- ED course


Complexity details: reviewed old records, reviewed results, re-evaluated 

patient, considered differential, d/w family


ED course: 


Patient presents with profound hypokalemia, which is not a new problem for this 

patient. However, he exhibits severely altered mental status (not typical with 

previous presentations), elevated BUN/creatinine (usually normal or mildly 

elevated), markedly elevated WBC and H/H, and thrombocytosis, pleural effusions,

and diffuse cerebral edema on CTH. He was intubated in ED for gradually 

worsening tachypnea with shallow respirations and, on ABG, marked hypercarbia 

with acidemia. 


D/W Dr. West at [facility]. She subsequently contacted neurology on 

call and then called me back; recommendations included rocephin, vancomycin, 

decadron, and transfer to [facility] (Dr. Suárez is accepting 

physician). 








- Critical Care


Time(min): 70


Time Includes: Direct patient care, Review records, Reassess patient, Document 

care, Coordinate care, Family consult for tx dec, See progress note


Data interpretation: Labs, Pulse ox, ABG, CXR, See progress note


Procedures included in critical care time: See progress note


Procedures excluded from critical care time: See progress note





Departure





- Departure


Disposition: 02 Transfer Acute Care Hosp


Clinical Impression: 


 Hypokalemia, Cerebral edema





Respiratory failure


Qualifiers:


 Chronicity: acute Respiratory failure complication: hypoxia and hypercapnia 

Qualified Code(s): J96.01 - Acute respiratory failure with hypoxia





Condition: Critical


Discharge Date/Time: 09/02/19 06:35

## 2019-09-24 ENCOUNTER — HOSPITAL ENCOUNTER (OUTPATIENT)
Dept: HOSPITAL 76 - LAB.N | Age: 29
Discharge: HOME | End: 2019-09-24
Attending: INTERNAL MEDICINE
Payer: MEDICAID

## 2019-09-24 DIAGNOSIS — N28.9: Primary | ICD-10-CM

## 2019-09-24 LAB
ANION GAP SERPL CALCULATED.4IONS-SCNC: 10 MMOL/L (ref 6–13)
BASOPHILS NFR BLD AUTO: 0 10^3/UL (ref 0–0.1)
BASOPHILS NFR BLD AUTO: 0.3 %
BUN SERPL-MCNC: 9 MG/DL (ref 6–20)
CALCIUM UR-MCNC: 8.7 MG/DL (ref 8.5–10.3)
CHLORIDE SERPL-SCNC: 99 MMOL/L (ref 101–111)
CO2 SERPL-SCNC: 23 MMOL/L (ref 21–32)
CREAT SERPLBLD-SCNC: 1 MG/DL (ref 0.6–1.2)
EOSINOPHIL # BLD AUTO: 0.2 10^3/UL (ref 0–0.7)
EOSINOPHIL NFR BLD AUTO: 2 %
ERYTHROCYTE [DISTWIDTH] IN BLOOD BY AUTOMATED COUNT: 13.2 % (ref 12–15)
GFRSERPLBLD MDRD-ARVRAT: 87 ML/MIN/{1.73_M2} (ref 89–?)
GLUCOSE SERPL-MCNC: 82 MG/DL (ref 70–100)
HGB UR QL STRIP: 16.1 G/DL (ref 14–18)
LYMPHOCYTES # SPEC AUTO: 2.8 10^3/UL (ref 1.5–3.5)
LYMPHOCYTES NFR BLD AUTO: 23 %
MAGNESIUM SERPL-MCNC: 2.1 MG/DL (ref 1.7–2.8)
MCH RBC QN AUTO: 30.7 PG (ref 27–31)
MCHC RBC AUTO-ENTMCNC: 34.6 G/DL (ref 32–36)
MCV RBC AUTO: 88.7 FL (ref 80–94)
MONOCYTES # BLD AUTO: 0.8 10^3/UL (ref 0–1)
MONOCYTES NFR BLD AUTO: 6.1 %
NEUTROPHILS # BLD AUTO: 8.4 10^3/UL (ref 1.5–6.6)
NEUTROPHILS # SNV AUTO: 12.3 X10^3/UL (ref 4.8–10.8)
NEUTROPHILS NFR BLD AUTO: 68 %
PDW BLD AUTO: 8.9 FL (ref 7.4–11.4)
PLATELET # BLD: 426 10^3/UL (ref 130–450)
RBC MAR: 5.24 10^6/UL (ref 4.7–6.1)
SODIUM SERPLBLD-SCNC: 132 MMOL/L (ref 135–145)

## 2019-09-24 PROCEDURE — 85025 COMPLETE CBC W/AUTO DIFF WBC: CPT

## 2019-09-24 PROCEDURE — 36415 COLL VENOUS BLD VENIPUNCTURE: CPT

## 2019-09-24 PROCEDURE — 83735 ASSAY OF MAGNESIUM: CPT

## 2019-09-24 PROCEDURE — 80048 BASIC METABOLIC PNL TOTAL CA: CPT

## 2019-09-25 ENCOUNTER — HOSPITAL ENCOUNTER (EMERGENCY)
Dept: HOSPITAL 76 - ED | Age: 29
Discharge: HOME | End: 2019-09-25
Payer: MEDICAID

## 2019-09-25 VITALS — SYSTOLIC BLOOD PRESSURE: 113 MMHG | DIASTOLIC BLOOD PRESSURE: 77 MMHG

## 2019-09-25 DIAGNOSIS — F17.200: ICD-10-CM

## 2019-09-25 DIAGNOSIS — E87.6: Primary | ICD-10-CM

## 2019-09-25 DIAGNOSIS — N25.89: ICD-10-CM

## 2019-09-25 LAB
ALBUMIN DIAFP-MCNC: 4.1 G/DL (ref 3.2–5.5)
ALBUMIN/GLOB SERPL: 1 {RATIO} (ref 1–2.2)
ALP SERPL-CCNC: 223 IU/L (ref 42–121)
ALT SERPL W P-5'-P-CCNC: 64 IU/L (ref 10–60)
ANION GAP SERPL CALCULATED.4IONS-SCNC: 11 MMOL/L (ref 6–13)
AST SERPL W P-5'-P-CCNC: 30 IU/L (ref 10–42)
BASE EXCESS BLDV CALC-SCNC: -3.6 MMOL/L
BASOPHILS NFR BLD AUTO: 0 10^3/UL (ref 0–0.1)
BASOPHILS NFR BLD AUTO: 0.4 %
BILIRUB BLD-MCNC: 0.2 MG/DL (ref 0.2–1)
BUN SERPL-MCNC: 11 MG/DL (ref 6–20)
CALCIUM UR-MCNC: 8.9 MG/DL (ref 8.5–10.3)
CHLORIDE SERPL-SCNC: 100 MMOL/L (ref 101–111)
CO2 BLDV-SCNC: 22.2 MMOL/L (ref 24–29)
CO2 SERPL-SCNC: 21 MMOL/L (ref 21–32)
CREAT SERPLBLD-SCNC: 1.1 MG/DL (ref 0.6–1.2)
EOSINOPHIL # BLD AUTO: 0.3 10^3/UL (ref 0–0.7)
EOSINOPHIL NFR BLD AUTO: 2.7 %
ERYTHROCYTE [DISTWIDTH] IN BLOOD BY AUTOMATED COUNT: 12.8 % (ref 12–15)
GFRSERPLBLD MDRD-ARVRAT: 78 ML/MIN/{1.73_M2} (ref 89–?)
GLOBULIN SER-MCNC: 4.1 G/DL (ref 2.1–4.2)
GLUCOSE SERPL-MCNC: 118 MG/DL (ref 70–100)
HGB UR QL STRIP: 16.9 G/DL (ref 14–18)
LIPASE SERPL-CCNC: 266 U/L (ref 22–51)
LYMPHOCYTES # SPEC AUTO: 2.6 10^3/UL (ref 1.5–3.5)
LYMPHOCYTES NFR BLD AUTO: 23 %
MAGNESIUM SERPL-MCNC: 2.2 MG/DL (ref 1.7–2.8)
MCH RBC QN AUTO: 31.2 PG (ref 27–31)
MCHC RBC AUTO-ENTMCNC: 35.9 G/DL (ref 32–36)
MCV RBC AUTO: 86.9 FL (ref 80–94)
MONOCYTES # BLD AUTO: 0.6 10^3/UL (ref 0–1)
MONOCYTES NFR BLD AUTO: 5.3 %
NEUTROPHILS # BLD AUTO: 7.7 10^3/UL (ref 1.5–6.6)
NEUTROPHILS # SNV AUTO: 11.2 X10^3/UL (ref 4.8–10.8)
NEUTROPHILS NFR BLD AUTO: 68.2 %
PCO2 BLDV: 37.4 MMHG (ref 41–51)
PDW BLD AUTO: 8.7 FL (ref 7.4–11.4)
PH BLDV: 7.37 [PH] (ref 7.31–7.41)
PHOSPHATE BLD-MCNC: 2.2 MG/DL (ref 2.5–4.6)
PLATELET # BLD: 417 10^3/UL (ref 130–450)
PO2 BLDV: 59.4 MMHG (ref 25–47)
PROT SPEC-MCNC: 8.2 G/DL (ref 6.7–8.2)
RBC MAR: 5.42 10^6/UL (ref 4.7–6.1)
SODIUM SERPLBLD-SCNC: 132 MMOL/L (ref 135–145)

## 2019-09-25 PROCEDURE — 82140 ASSAY OF AMMONIA: CPT

## 2019-09-25 PROCEDURE — 82803 BLOOD GASES ANY COMBINATION: CPT

## 2019-09-25 PROCEDURE — 85025 COMPLETE CBC W/AUTO DIFF WBC: CPT

## 2019-09-25 PROCEDURE — 36415 COLL VENOUS BLD VENIPUNCTURE: CPT

## 2019-09-25 PROCEDURE — 83690 ASSAY OF LIPASE: CPT

## 2019-09-25 PROCEDURE — 96365 THER/PROPH/DIAG IV INF INIT: CPT

## 2019-09-25 PROCEDURE — 99284 EMERGENCY DEPT VISIT MOD MDM: CPT

## 2019-09-25 PROCEDURE — 99283 EMERGENCY DEPT VISIT LOW MDM: CPT

## 2019-09-25 PROCEDURE — 80053 COMPREHEN METABOLIC PANEL: CPT

## 2019-09-25 PROCEDURE — 84100 ASSAY OF PHOSPHORUS: CPT

## 2019-09-25 PROCEDURE — 83735 ASSAY OF MAGNESIUM: CPT

## 2019-09-25 NOTE — ED PHYSICIAN DOCUMENTATION
History of Present Illness





- Stated complaint


Stated Complaint: Hypokalemia





- Chief complaint


Chief Complaint: General





- History obtained from


History obtained from: Patient





- History of Present Illness


Timing: Yesterday (This is a young man with renal tubular acidosis that is 

potassium wasting.  He had a routine visit with his yesterday and reportedly his

potassium was 2.6 and he was referred here for IV potassium therapy.  He is 

actually feeling okay.)





Review of Systems


Constitutional: denies: Fever, Chills


Cardiac: denies: Chest pain / pressure, Palpitations


Respiratory: denies: Dyspnea, Cough





PD PAST MEDICAL HISTORY





- Past Medical History


Cardiovascular: None


Respiratory: None


Neuro: None


Endocrine/Autoimmune: None


GI: None


GYN: Other (Renal tubular acidosis)


: None


HEENT: None


Psych: Depression, Anxiety


Musculoskeletal: None


Derm: None, Other





- Past Surgical History


Past Surgical History: Yes





- Present Medications


Home Medications: 


                                Ambulatory Orders











 Medication  Instructions  Recorded  Confirmed


 


Potassium Chloride [Klor-Con M20] 120 meq PO QID #120 tab.er.prt 07/14/18 














- Allergies


Allergies/Adverse Reactions: 


                                    Allergies











Allergy/AdvReac Type Severity Reaction Status Date / Time


 


No Known Drug Allergies Allergy   Verified 09/25/19 18:00














- Social History


Does the pt smoke?: Yes


Smoking Status: Current every day smoker


Does the pt drink ETOH?: No


Does the pt have substance abuse?: Yes





- Immunizations


Immunizations are current?: Yes





- POLST


Patient has POLST: No


POLST Status: Full Code





PD ED PE NORMAL





- Vitals


Vital signs reviewed: Yes





- General


General: Alert and oriented X 3, No acute distress





- Cardiac


Cardiac: RRR, No murmur





- Respiratory


Respiratory: No respiratory distress, Clear bilaterally





- Abdomen


Abdomen: Non tender





- Extremities


Extremities: No edema, No calf tenderness / cord





- Neuro


Neuro: Alert and oriented X 3, Normal speech





Results





- Vitals


Vitals: 


                               Vital Signs - 24 hr











  09/25/19 09/25/19 09/25/19





  17:52 18:00 18:26


 


Temperature 37.3 C 37.3 C 


 


Heart Rate 85 85 84


 


Respiratory 15 15 24





Rate   


 


Blood Pressure 152/89 H 152/89 H 125/68


 


O2 Saturation 99 99 99














  09/25/19





  18:34


 


Temperature 


 


Heart Rate 83


 


Respiratory 18





Rate 


 


Blood Pressure 132/83 H


 


O2 Saturation 99








                                     Oxygen











O2 Source                      Room air

















- Labs


Labs: 


                                Laboratory Tests











  09/25/19 09/25/19 09/25/19





  18:15 18:15 18:15


 


WBC  11.2 H  


 


RBC  5.42  


 


Hgb  16.9  


 


Hct  47.1  


 


MCV  86.9  


 


MCH  31.2 H  


 


MCHC  35.9  


 


RDW  12.8  


 


Plt Count  417  


 


MPV  8.7  


 


Neut # (Auto)  7.7 H  


 


Lymph # (Auto)  2.6  


 


Mono # (Auto)  0.6  


 


Eos # (Auto)  0.3  


 


Baso # (Auto)  0.0  


 


Absolute Nucleated RBC  0.00  


 


Nucleated RBC %  0.0  


 


VBG pH   


 


VBG pCO2   


 


VBG pO2   


 


VBG HCO3   


 


VBG Total CO2   


 


VBG O2 Saturation   


 


VBG Base Excess   


 


Sodium   132 L 


 


Potassium   2.9 L 


 


Chloride   100 L 


 


Carbon Dioxide   21 


 


Anion Gap   11.0 


 


BUN   11 


 


Creatinine   1.1 


 


Estimated GFR (MDRD)   78 L 


 


Glucose   118 H 


 


Calcium   8.9 


 


Phosphorus   2.2 L 


 


Magnesium   2.2 


 


Total Bilirubin   0.2 


 


AST   30 


 


ALT   64 H 


 


Alkaline Phosphatase   223 H 


 


Ammonia    45.5 H


 


Total Protein   8.2 


 


Albumin   4.1 


 


Globulin   4.1 


 


Albumin/Globulin Ratio   1.0 


 


Lipase   266 H 














  09/25/19





  18:15


 


WBC 


 


RBC 


 


Hgb 


 


Hct 


 


MCV 


 


MCH 


 


MCHC 


 


RDW 


 


Plt Count 


 


MPV 


 


Neut # (Auto) 


 


Lymph # (Auto) 


 


Mono # (Auto) 


 


Eos # (Auto) 


 


Baso # (Auto) 


 


Absolute Nucleated RBC 


 


Nucleated RBC % 


 


VBG pH  7.368


 


VBG pCO2  37.4 L


 


VBG pO2  59.4 H


 


VBG HCO3  21.0 L


 


VBG Total CO2  22.2 L


 


VBG O2 Saturation  91.5 H


 


VBG Base Excess  -3.6 L


 


Sodium 


 


Potassium 


 


Chloride 


 


Carbon Dioxide 


 


Anion Gap 


 


BUN 


 


Creatinine 


 


Estimated GFR (MDRD) 


 


Glucose 


 


Calcium 


 


Phosphorus 


 


Magnesium 


 


Total Bilirubin 


 


AST 


 


ALT 


 


Alkaline Phosphatase 


 


Ammonia 


 


Total Protein 


 


Albumin 


 


Globulin 


 


Albumin/Globulin Ratio 


 


Lipase 














PD MEDICAL DECISION MAKING





- ED course


ED course: 





His potassium today is 2.9, actually for him that is very good, it is rare that 

he has an outpatient potassium at that high.  He received IV potassium.


Also oral phosphorus





Departure





- Departure


Disposition: 01 Home, Self Care


Clinical Impression: 


 Hypokalemia





Condition: Good


Record reviewed to determine appropriate education?: Yes


Instructions:  Hypokalemia Dc


Comments: 


Your potassium today was 2.9 which actually for you is pretty good, continue 

current medications.  Return for new or worsening symptoms.  Follow-up with your

nephrologist.

## 2019-09-30 ENCOUNTER — HOSPITAL ENCOUNTER (OUTPATIENT)
Dept: HOSPITAL 76 - LAB.N | Age: 29
Discharge: HOME | End: 2019-09-30
Attending: INTERNAL MEDICINE
Payer: MEDICAID

## 2019-09-30 DIAGNOSIS — N28.9: ICD-10-CM

## 2019-09-30 DIAGNOSIS — E87.6: Primary | ICD-10-CM

## 2019-09-30 LAB
ANION GAP SERPL CALCULATED.4IONS-SCNC: 9 MMOL/L (ref 6–13)
BUN SERPL-MCNC: 7 MG/DL (ref 6–20)
CALCIUM UR-MCNC: 8.9 MG/DL (ref 8.5–10.3)
CHLORIDE SERPL-SCNC: 107 MMOL/L (ref 101–111)
CO2 SERPL-SCNC: 23 MMOL/L (ref 21–32)
CREAT SERPLBLD-SCNC: 0.9 MG/DL (ref 0.6–1.2)
GFRSERPLBLD MDRD-ARVRAT: 98 ML/MIN/{1.73_M2} (ref 89–?)
GLUCOSE SERPL-MCNC: 96 MG/DL (ref 70–100)
SODIUM SERPLBLD-SCNC: 139 MMOL/L (ref 135–145)

## 2019-09-30 PROCEDURE — 36415 COLL VENOUS BLD VENIPUNCTURE: CPT

## 2019-09-30 PROCEDURE — 80048 BASIC METABOLIC PNL TOTAL CA: CPT

## 2019-10-02 ENCOUNTER — HOSPITAL ENCOUNTER (OUTPATIENT)
Dept: HOSPITAL 76 - LAB.N | Age: 29
Discharge: HOME | End: 2019-10-02
Attending: INTERNAL MEDICINE
Payer: MEDICAID

## 2019-10-02 DIAGNOSIS — N25.89: Primary | ICD-10-CM

## 2019-10-02 LAB
BASOPHILS NFR BLD AUTO: 0.1 10^3/UL (ref 0–0.1)
BASOPHILS NFR BLD AUTO: 0.6 %
CLARITY UR REFRACT.AUTO: CLEAR
CREAT UR-SCNC: 48.7 MG/DL
EOSINOPHIL # BLD AUTO: 0.4 10^3/UL (ref 0–0.7)
EOSINOPHIL NFR BLD AUTO: 4.7 %
ERYTHROCYTE [DISTWIDTH] IN BLOOD BY AUTOMATED COUNT: 13.2 % (ref 12–15)
GLUCOSE UR QL STRIP.AUTO: NEGATIVE MG/DL
HGB UR QL STRIP: 16.6 G/DL (ref 14–18)
KETONES UR QL STRIP.AUTO: NEGATIVE MG/DL
LYMPHOCYTES # SPEC AUTO: 2 10^3/UL (ref 1.5–3.5)
LYMPHOCYTES NFR BLD AUTO: 23.2 %
MAGNESIUM SERPL-MCNC: 2.5 MG/DL (ref 1.7–2.8)
MCH RBC QN AUTO: 31 PG (ref 27–31)
MCHC RBC AUTO-ENTMCNC: 34.3 G/DL (ref 32–36)
MCV RBC AUTO: 90.3 FL (ref 80–94)
MICROALBUMIN UR-MCNC: 2.1 MG/DL (ref 0–300)
MICROALBUMIN/CREAT RATIO PNL UR: 43.1 UG/MG (ref ?–30)
MONOCYTES # BLD AUTO: 0.6 10^3/UL (ref 0–1)
MONOCYTES NFR BLD AUTO: 6.5 %
NEUTROPHILS # BLD AUTO: 5.6 10^3/UL (ref 1.5–6.6)
NEUTROPHILS # SNV AUTO: 8.8 X10^3/UL (ref 4.8–10.8)
NEUTROPHILS NFR BLD AUTO: 64.1 %
NITRITE UR QL STRIP.AUTO: NEGATIVE
PDW BLD AUTO: 8.5 FL (ref 7.4–11.4)
PH UR STRIP.AUTO: 7.5 PH (ref 5–7.5)
PLATELET # BLD: 330 10^3/UL (ref 130–450)
PROT UR STRIP.AUTO-MCNC: (no result) MG/DL
RBC # UR STRIP.AUTO: NEGATIVE /UL
RBC # URNS HPF: (no result) /HPF (ref 0–5)
RBC MAR: 5.36 10^6/UL (ref 4.7–6.1)
SP GR UR STRIP.AUTO: 1.01 (ref 1–1.03)
SQUAMOUS URNS QL MICRO: (no result)
URATE SERPL-MCNC: 5.2 MG/DL (ref 2.6–7.2)
UROBILINOGEN UR QL STRIP.AUTO: (no result) E.U./DL
UROBILINOGEN UR STRIP.AUTO-MCNC: NEGATIVE MG/DL

## 2019-10-02 PROCEDURE — 80048 BASIC METABOLIC PNL TOTAL CA: CPT

## 2019-10-02 PROCEDURE — 82043 UR ALBUMIN QUANTITATIVE: CPT

## 2019-10-02 PROCEDURE — 83735 ASSAY OF MAGNESIUM: CPT

## 2019-10-02 PROCEDURE — 82570 ASSAY OF URINE CREATININE: CPT

## 2019-10-02 PROCEDURE — 81001 URINALYSIS AUTO W/SCOPE: CPT

## 2019-10-02 PROCEDURE — 85025 COMPLETE CBC W/AUTO DIFF WBC: CPT

## 2019-10-02 PROCEDURE — 83970 ASSAY OF PARATHORMONE: CPT

## 2019-10-02 PROCEDURE — 82306 VITAMIN D 25 HYDROXY: CPT

## 2019-10-02 PROCEDURE — 84550 ASSAY OF BLOOD/URIC ACID: CPT

## 2019-10-02 PROCEDURE — 36415 COLL VENOUS BLD VENIPUNCTURE: CPT

## 2019-10-08 ENCOUNTER — HOSPITAL ENCOUNTER (OUTPATIENT)
Dept: HOSPITAL 76 - LAB.N | Age: 29
End: 2019-10-08
Attending: INTERNAL MEDICINE
Payer: MEDICAID

## 2019-10-08 DIAGNOSIS — N25.89: Primary | ICD-10-CM

## 2019-10-08 LAB
ALBUMIN DIAFP-MCNC: 4.1 G/DL (ref 3.2–5.5)
ANION GAP SERPL CALCULATED.4IONS-SCNC: 13 MMOL/L (ref 6–13)
BUN SERPL-MCNC: 10 MG/DL (ref 6–20)
CALCIUM UR-MCNC: 9 MG/DL (ref 8.5–10.3)
CHLORIDE SERPL-SCNC: 102 MMOL/L (ref 101–111)
CO2 SERPL-SCNC: 22 MMOL/L (ref 21–32)
CREAT SERPLBLD-SCNC: 0.9 MG/DL (ref 0.6–1.2)
GFRSERPLBLD MDRD-ARVRAT: 98 ML/MIN/{1.73_M2} (ref 89–?)
GLUCOSE SERPL-MCNC: 106 MG/DL (ref 70–100)
MAGNESIUM SERPL-MCNC: 2.6 MG/DL (ref 1.7–2.8)
PHOSPHATE BLD-MCNC: 2.2 MG/DL (ref 2.5–4.6)
SODIUM SERPLBLD-SCNC: 137 MMOL/L (ref 135–145)

## 2019-10-08 PROCEDURE — 36415 COLL VENOUS BLD VENIPUNCTURE: CPT

## 2019-10-08 PROCEDURE — 80069 RENAL FUNCTION PANEL: CPT

## 2019-10-08 PROCEDURE — 83735 ASSAY OF MAGNESIUM: CPT

## 2019-10-15 ENCOUNTER — HOSPITAL ENCOUNTER (OUTPATIENT)
Dept: HOSPITAL 76 - LAB.N | Age: 29
Discharge: HOME | End: 2019-10-15
Attending: INTERNAL MEDICINE
Payer: MEDICAID

## 2019-10-15 DIAGNOSIS — N25.89: Primary | ICD-10-CM

## 2019-10-15 LAB
ALBUMIN DIAFP-MCNC: 4 G/DL (ref 3.2–5.5)
ANION GAP SERPL CALCULATED.4IONS-SCNC: 12 MMOL/L (ref 6–13)
BUN SERPL-MCNC: 9 MG/DL (ref 6–20)
CALCIUM UR-MCNC: 8.4 MG/DL (ref 8.5–10.3)
CHLORIDE SERPL-SCNC: 103 MMOL/L (ref 101–111)
CO2 SERPL-SCNC: 22 MMOL/L (ref 21–32)
CREAT SERPLBLD-SCNC: 1.1 MG/DL (ref 0.6–1.2)
GFRSERPLBLD MDRD-ARVRAT: 78 ML/MIN/{1.73_M2} (ref 89–?)
GLUCOSE SERPL-MCNC: 75 MG/DL (ref 70–100)
MAGNESIUM SERPL-MCNC: 2.3 MG/DL (ref 1.7–2.8)
PHOSPHATE BLD-MCNC: 3.4 MG/DL (ref 2.5–4.6)
SODIUM SERPLBLD-SCNC: 137 MMOL/L (ref 135–145)

## 2019-10-15 PROCEDURE — 36415 COLL VENOUS BLD VENIPUNCTURE: CPT

## 2019-10-15 PROCEDURE — 83735 ASSAY OF MAGNESIUM: CPT

## 2019-10-15 PROCEDURE — 80069 RENAL FUNCTION PANEL: CPT

## 2019-10-16 ENCOUNTER — HOSPITAL ENCOUNTER (EMERGENCY)
Dept: HOSPITAL 76 - ED | Age: 29
Discharge: HOME | End: 2019-10-16
Payer: MEDICAID

## 2019-10-16 VITALS — SYSTOLIC BLOOD PRESSURE: 111 MMHG | DIASTOLIC BLOOD PRESSURE: 76 MMHG

## 2019-10-16 DIAGNOSIS — F17.200: ICD-10-CM

## 2019-10-16 DIAGNOSIS — N25.89: Primary | ICD-10-CM

## 2019-10-16 LAB
ALBUMIN DIAFP-MCNC: 4.3 G/DL (ref 3.2–5.5)
ALBUMIN/GLOB SERPL: 1 {RATIO} (ref 1–2.2)
ALP SERPL-CCNC: 217 IU/L (ref 42–121)
ALT SERPL W P-5'-P-CCNC: 31 IU/L (ref 10–60)
ANION GAP SERPL CALCULATED.4IONS-SCNC: 12 MMOL/L (ref 6–13)
AST SERPL W P-5'-P-CCNC: 22 IU/L (ref 10–42)
BILIRUB BLD-MCNC: 0.6 MG/DL (ref 0.2–1)
BUN SERPL-MCNC: 10 MG/DL (ref 6–20)
CALCIUM UR-MCNC: 9.4 MG/DL (ref 8.5–10.3)
CHLORIDE SERPL-SCNC: 103 MMOL/L (ref 101–111)
CO2 SERPL-SCNC: 24 MMOL/L (ref 21–32)
CREAT SERPLBLD-SCNC: 1.1 MG/DL (ref 0.6–1.2)
GFRSERPLBLD MDRD-ARVRAT: 78 ML/MIN/{1.73_M2} (ref 89–?)
GLOBULIN SER-MCNC: 4.2 G/DL (ref 2.1–4.2)
GLUCOSE SERPL-MCNC: 90 MG/DL (ref 70–100)
LIPASE SERPL-CCNC: 98 U/L (ref 22–51)
MAGNESIUM SERPL-MCNC: 2.7 MG/DL (ref 1.7–2.8)
PHOSPHATE BLD-MCNC: 3.3 MG/DL (ref 2.5–4.6)
PROT SPEC-MCNC: 8.5 G/DL (ref 6.7–8.2)
SODIUM SERPLBLD-SCNC: 139 MMOL/L (ref 135–145)

## 2019-10-16 PROCEDURE — 83690 ASSAY OF LIPASE: CPT

## 2019-10-16 PROCEDURE — 36415 COLL VENOUS BLD VENIPUNCTURE: CPT

## 2019-10-16 PROCEDURE — 83735 ASSAY OF MAGNESIUM: CPT

## 2019-10-16 PROCEDURE — 84100 ASSAY OF PHOSPHORUS: CPT

## 2019-10-16 PROCEDURE — 99283 EMERGENCY DEPT VISIT LOW MDM: CPT

## 2019-10-16 PROCEDURE — 80053 COMPREHEN METABOLIC PANEL: CPT

## 2019-10-16 NOTE — ED PHYSICIAN DOCUMENTATION
History of Present Illness





- Stated complaint


Stated Complaint: IV NEEDED





- Chief complaint


Chief Complaint: General





- History obtained from


History obtained from: Patient (29-year-old gentleman with a history of 

potassium wasting renal tubular acidosis.  Had outpatient labs yesterday with a 

potassium of 2.3.  He feels fine.)





Review of Systems


Constitutional: denies: Fever, Chills, Fatigue


Respiratory: reports: Reviewed and negative


GI: reports: Reviewed and negative





PD PAST MEDICAL HISTORY





- Past Medical History


Cardiovascular: None


Respiratory: None


Neuro: None


Endocrine/Autoimmune: None


GI: None


GYN: Other (Renal tubular acidosis)


: None


HEENT: None


Psych: Depression, Anxiety


Musculoskeletal: None


Derm: None, Other





- Past Surgical History


Past Surgical History: Yes





- Present Medications


Home Medications: 


                                Ambulatory Orders











 Medication  Instructions  Recorded  Confirmed


 


Potassium Chloride [Klor-Con M20] 120 meq PO QID #120 tab.er.prt 07/14/18 


 


Spironolactone 25 mg PO 10/16/19 10/16/19














- Allergies


Allergies/Adverse Reactions: 


                                    Allergies











Allergy/AdvReac Type Severity Reaction Status Date / Time


 


No Known Drug Allergies Allergy   Verified 10/16/19 15:07














- Social History


Does the pt smoke?: Yes


Smoking Status: Current every day smoker


Does the pt drink ETOH?: No


Does the pt have substance abuse?: Yes





- Immunizations


Immunizations are current?: Yes





- POLST


Patient has POLST: No


POLST Status: Full Code





PD ED PE NORMAL





- Vitals


Vital signs reviewed: Yes





- General


General: Alert and oriented X 3, No acute distress





- Neuro


Neuro: Alert and oriented X 3, Normal speech





- Psych


Psych: Normal mood, Normal affect





Results





- Vitals


Vitals: 





                               Vital Signs - 24 hr











  10/16/19





  15:06


 


Temperature 37.2 C


 


Heart Rate 95


 


Respiratory 18





Rate 


 


Blood Pressure 118/71


 


O2 Saturation 96








                                     Oxygen











O2 Source                      Room air

















- Labs


Labs: 





                                Laboratory Tests











  10/16/19





  15:50


 


Sodium  139


 


Potassium  4.1


 


Chloride  103


 


Carbon Dioxide  24


 


Anion Gap  12.0


 


BUN  10


 


Creatinine  1.1


 


Estimated GFR (MDRD)  78 L


 


Glucose  90


 


Calcium  9.4


 


Phosphorus  3.3


 


Magnesium  2.7


 


Total Bilirubin  0.6


 


AST  22


 


ALT  31


 


Alkaline Phosphatase  217 H


 


Total Protein  8.5 H


 


Albumin  4.3


 


Globulin  4.2


 


Albumin/Globulin Ratio  1.0


 


Lipase  98 H














PD MEDICAL DECISION MAKING





- ED course


ED course: 





Potassium is now 4.1, he needs no intervention at this juncture.





Departure





- Departure


Disposition: 01 Home, Self Care


Clinical Impression: 


 Renal tubular acidosis type II





Condition: Good


Record reviewed to determine appropriate education?: Yes


Comments: 


Continue current medications.  Return as needed.

## 2019-10-22 ENCOUNTER — HOSPITAL ENCOUNTER (OUTPATIENT)
Dept: HOSPITAL 76 - LAB.N | Age: 29
End: 2019-10-22
Attending: INTERNAL MEDICINE
Payer: MEDICAID

## 2019-10-22 DIAGNOSIS — N25.89: Primary | ICD-10-CM

## 2019-10-22 LAB
ALBUMIN DIAFP-MCNC: 4.1 G/DL (ref 3.2–5.5)
ANION GAP SERPL CALCULATED.4IONS-SCNC: 8 MMOL/L (ref 6–13)
BUN SERPL-MCNC: 11 MG/DL (ref 6–20)
CALCIUM UR-MCNC: 9.2 MG/DL (ref 8.5–10.3)
CHLORIDE SERPL-SCNC: 106 MMOL/L (ref 101–111)
CO2 SERPL-SCNC: 24 MMOL/L (ref 21–32)
CREAT SERPLBLD-SCNC: 1.1 MG/DL (ref 0.6–1.2)
GFRSERPLBLD MDRD-ARVRAT: 78 ML/MIN/{1.73_M2} (ref 89–?)
GLUCOSE SERPL-MCNC: 135 MG/DL (ref 70–100)
MAGNESIUM SERPL-MCNC: 2.8 MG/DL (ref 1.7–2.8)
PHOSPHATE BLD-MCNC: 3.1 MG/DL (ref 2.5–4.6)
SODIUM SERPLBLD-SCNC: 138 MMOL/L (ref 135–145)

## 2019-10-22 PROCEDURE — 83735 ASSAY OF MAGNESIUM: CPT

## 2019-10-22 PROCEDURE — 36415 COLL VENOUS BLD VENIPUNCTURE: CPT

## 2019-10-22 PROCEDURE — 80069 RENAL FUNCTION PANEL: CPT

## 2019-10-23 ENCOUNTER — HOSPITAL ENCOUNTER (OUTPATIENT)
Dept: HOSPITAL 76 - LAB | Age: 29
Discharge: HOME | End: 2019-10-23
Attending: INTERNAL MEDICINE
Payer: MEDICAID

## 2019-10-23 DIAGNOSIS — N25.89: Primary | ICD-10-CM

## 2019-10-23 LAB
ANION GAP SERPL CALCULATED.4IONS-SCNC: 9 MMOL/L (ref 6–13)
BUN SERPL-MCNC: 10 MG/DL (ref 6–20)
CALCIUM UR-MCNC: 9.2 MG/DL (ref 8.5–10.3)
CHLORIDE SERPL-SCNC: 102 MMOL/L (ref 101–111)
CO2 SERPL-SCNC: 28 MMOL/L (ref 21–32)
CREAT SERPLBLD-SCNC: 1.1 MG/DL (ref 0.6–1.2)
GFRSERPLBLD MDRD-ARVRAT: 78 ML/MIN/{1.73_M2} (ref 89–?)
GLUCOSE SERPL-MCNC: 85 MG/DL (ref 70–100)
SODIUM SERPLBLD-SCNC: 139 MMOL/L (ref 135–145)

## 2019-10-23 PROCEDURE — 36415 COLL VENOUS BLD VENIPUNCTURE: CPT

## 2019-10-23 PROCEDURE — 80048 BASIC METABOLIC PNL TOTAL CA: CPT

## 2019-10-29 ENCOUNTER — HOSPITAL ENCOUNTER (OUTPATIENT)
Dept: HOSPITAL 76 - LAB | Age: 29
Discharge: HOME | End: 2019-10-29
Attending: INTERNAL MEDICINE
Payer: MEDICAID

## 2019-10-29 DIAGNOSIS — N25.89: Primary | ICD-10-CM

## 2019-10-29 LAB
ANION GAP SERPL CALCULATED.4IONS-SCNC: 9 MMOL/L (ref 6–13)
BUN SERPL-MCNC: 9 MG/DL (ref 6–20)
CALCIUM UR-MCNC: 9.6 MG/DL (ref 8.5–10.3)
CHLORIDE SERPL-SCNC: 105 MMOL/L (ref 101–111)
CO2 SERPL-SCNC: 27 MMOL/L (ref 21–32)
CREAT SERPLBLD-SCNC: 1.1 MG/DL (ref 0.6–1.2)
GFRSERPLBLD MDRD-ARVRAT: 78 ML/MIN/{1.73_M2} (ref 89–?)
GLUCOSE SERPL-MCNC: 111 MG/DL (ref 70–100)
SODIUM SERPLBLD-SCNC: 141 MMOL/L (ref 135–145)

## 2019-10-29 PROCEDURE — 80048 BASIC METABOLIC PNL TOTAL CA: CPT

## 2019-10-29 PROCEDURE — 36415 COLL VENOUS BLD VENIPUNCTURE: CPT

## 2019-11-05 ENCOUNTER — HOSPITAL ENCOUNTER (OUTPATIENT)
Dept: HOSPITAL 76 - LAB | Age: 29
Discharge: HOME | End: 2019-11-05
Attending: INTERNAL MEDICINE
Payer: MEDICAID

## 2019-11-05 DIAGNOSIS — N25.89: Primary | ICD-10-CM

## 2019-11-05 LAB
ANION GAP SERPL CALCULATED.4IONS-SCNC: 12 MMOL/L (ref 6–13)
BUN SERPL-MCNC: 10 MG/DL (ref 6–20)
CALCIUM UR-MCNC: 8.7 MG/DL (ref 8.5–10.3)
CHLORIDE SERPL-SCNC: 102 MMOL/L (ref 101–111)
CO2 SERPL-SCNC: 26 MMOL/L (ref 21–32)
CREAT SERPLBLD-SCNC: 1.1 MG/DL (ref 0.6–1.2)
GFRSERPLBLD MDRD-ARVRAT: 78 ML/MIN/{1.73_M2} (ref 89–?)
GLUCOSE SERPL-MCNC: 85 MG/DL (ref 70–100)
SODIUM SERPLBLD-SCNC: 140 MMOL/L (ref 135–145)

## 2019-11-05 PROCEDURE — 36415 COLL VENOUS BLD VENIPUNCTURE: CPT

## 2019-11-05 PROCEDURE — 80048 BASIC METABOLIC PNL TOTAL CA: CPT

## 2019-11-12 ENCOUNTER — HOSPITAL ENCOUNTER (OUTPATIENT)
Dept: HOSPITAL 76 - LAB | Age: 29
Discharge: HOME | End: 2019-11-12
Attending: INTERNAL MEDICINE
Payer: MEDICAID

## 2019-11-12 DIAGNOSIS — N25.89: Primary | ICD-10-CM

## 2019-11-12 DIAGNOSIS — E55.9: ICD-10-CM

## 2019-11-12 LAB
ANION GAP SERPL CALCULATED.4IONS-SCNC: 10 MMOL/L (ref 6–13)
BASOPHILS NFR BLD AUTO: 0.1 10^3/UL (ref 0–0.1)
BASOPHILS NFR BLD AUTO: 0.4 %
BUN SERPL-MCNC: 15 MG/DL (ref 6–20)
CALCIUM UR-MCNC: 9 MG/DL (ref 8.5–10.3)
CHLORIDE SERPL-SCNC: 101 MMOL/L (ref 101–111)
CO2 SERPL-SCNC: 25 MMOL/L (ref 21–32)
CREAT SERPLBLD-SCNC: 1.1 MG/DL (ref 0.6–1.2)
CREAT UR-SCNC: 46.3 MG/DL
EOSINOPHIL # BLD AUTO: 0.4 10^3/UL (ref 0–0.7)
EOSINOPHIL NFR BLD AUTO: 3.6 %
ERYTHROCYTE [DISTWIDTH] IN BLOOD BY AUTOMATED COUNT: 12.6 % (ref 12–15)
GFRSERPLBLD MDRD-ARVRAT: 78 ML/MIN/{1.73_M2} (ref 89–?)
GLUCOSE SERPL-MCNC: 112 MG/DL (ref 70–100)
HGB UR QL STRIP: 17.1 G/DL (ref 14–18)
LYMPHOCYTES # SPEC AUTO: 2.5 10^3/UL (ref 1.5–3.5)
LYMPHOCYTES NFR BLD AUTO: 21 %
MAGNESIUM SERPL-MCNC: 2.6 MG/DL (ref 1.7–2.8)
MCH RBC QN AUTO: 30.3 PG (ref 27–31)
MCHC RBC AUTO-ENTMCNC: 33.3 G/DL (ref 32–36)
MCV RBC AUTO: 90.8 FL (ref 80–94)
MICROALBUMIN UR-MCNC: 0.7 MG/DL (ref 0–300)
MICROALBUMIN/CREAT RATIO PNL UR: 15.1 UG/MG (ref ?–30)
MONOCYTES # BLD AUTO: 0.6 10^3/UL (ref 0–1)
MONOCYTES NFR BLD AUTO: 5.4 %
NEUTROPHILS # BLD AUTO: 8 10^3/UL (ref 1.5–6.6)
NEUTROPHILS # SNV AUTO: 11.7 X10^3/UL (ref 4.8–10.8)
NEUTROPHILS NFR BLD AUTO: 69 %
PDW BLD AUTO: 8.8 FL (ref 7.4–11.4)
PLATELET # BLD: 337 10^3/UL (ref 130–450)
RBC MAR: 5.65 10^6/UL (ref 4.7–6.1)
SODIUM SERPLBLD-SCNC: 136 MMOL/L (ref 135–145)

## 2019-11-12 PROCEDURE — 83970 ASSAY OF PARATHORMONE: CPT

## 2019-11-12 PROCEDURE — 82310 ASSAY OF CALCIUM: CPT

## 2019-11-12 PROCEDURE — 82570 ASSAY OF URINE CREATININE: CPT

## 2019-11-12 PROCEDURE — 82306 VITAMIN D 25 HYDROXY: CPT

## 2019-11-12 PROCEDURE — 83735 ASSAY OF MAGNESIUM: CPT

## 2019-11-12 PROCEDURE — 85025 COMPLETE CBC W/AUTO DIFF WBC: CPT

## 2019-11-12 PROCEDURE — 82043 UR ALBUMIN QUANTITATIVE: CPT

## 2019-11-12 PROCEDURE — 80048 BASIC METABOLIC PNL TOTAL CA: CPT

## 2019-11-12 PROCEDURE — 81599 UNLISTED MAAA: CPT

## 2019-11-12 PROCEDURE — 36415 COLL VENOUS BLD VENIPUNCTURE: CPT

## 2019-11-19 ENCOUNTER — HOSPITAL ENCOUNTER (OUTPATIENT)
Dept: HOSPITAL 76 - LAB | Age: 29
Discharge: HOME | End: 2019-11-19
Attending: INTERNAL MEDICINE
Payer: MEDICAID

## 2019-11-19 DIAGNOSIS — N25.89: Primary | ICD-10-CM

## 2019-11-19 LAB
ANION GAP SERPL CALCULATED.4IONS-SCNC: 12 MMOL/L (ref 6–13)
BUN SERPL-MCNC: 10 MG/DL (ref 6–20)
CALCIUM UR-MCNC: 8.8 MG/DL (ref 8.5–10.3)
CHLORIDE SERPL-SCNC: 100 MMOL/L (ref 101–111)
CO2 SERPL-SCNC: 25 MMOL/L (ref 21–32)
CREAT SERPLBLD-SCNC: 1.2 MG/DL (ref 0.6–1.2)
GFRSERPLBLD MDRD-ARVRAT: 71 ML/MIN/{1.73_M2} (ref 89–?)
GLUCOSE SERPL-MCNC: 112 MG/DL (ref 70–100)
SODIUM SERPLBLD-SCNC: 137 MMOL/L (ref 135–145)

## 2019-11-19 PROCEDURE — 80048 BASIC METABOLIC PNL TOTAL CA: CPT

## 2019-11-19 PROCEDURE — 36415 COLL VENOUS BLD VENIPUNCTURE: CPT

## 2019-11-26 ENCOUNTER — HOSPITAL ENCOUNTER (OUTPATIENT)
Dept: HOSPITAL 76 - LAB | Age: 29
Discharge: HOME | End: 2019-11-26
Attending: INTERNAL MEDICINE
Payer: MEDICAID

## 2019-11-26 DIAGNOSIS — N25.89: Primary | ICD-10-CM

## 2019-11-26 DIAGNOSIS — E55.9: ICD-10-CM

## 2019-11-26 LAB
ANION GAP SERPL CALCULATED.4IONS-SCNC: 9 MMOL/L (ref 6–13)
BUN SERPL-MCNC: 11 MG/DL (ref 6–20)
CALCIUM UR-MCNC: 9 MG/DL (ref 8.5–10.3)
CHLORIDE SERPL-SCNC: 104 MMOL/L (ref 101–111)
CLARITY UR REFRACT.AUTO: CLEAR
CO2 SERPL-SCNC: 25 MMOL/L (ref 21–32)
CREAT SERPLBLD-SCNC: 1.1 MG/DL (ref 0.6–1.2)
GFRSERPLBLD MDRD-ARVRAT: 78 ML/MIN/{1.73_M2} (ref 89–?)
GLUCOSE SERPL-MCNC: 74 MG/DL (ref 70–100)
GLUCOSE UR QL STRIP.AUTO: NEGATIVE MG/DL
KETONES UR QL STRIP.AUTO: NEGATIVE MG/DL
NITRITE UR QL STRIP.AUTO: NEGATIVE
PH UR STRIP.AUTO: 7.5 PH (ref 5–7.5)
PHOSPHATE BLD-MCNC: 3.7 MG/DL (ref 2.5–4.6)
PROT UR STRIP.AUTO-MCNC: NEGATIVE MG/DL
RBC # UR STRIP.AUTO: NEGATIVE /UL
SODIUM SERPLBLD-SCNC: 138 MMOL/L (ref 135–145)
SP GR UR STRIP.AUTO: 1.01 (ref 1–1.03)
SQUAMOUS URNS QL MICRO: (no result)
UROBILINOGEN UR QL STRIP.AUTO: (no result) E.U./DL
UROBILINOGEN UR STRIP.AUTO-MCNC: NEGATIVE MG/DL

## 2019-11-26 PROCEDURE — 81001 URINALYSIS AUTO W/SCOPE: CPT

## 2019-11-26 PROCEDURE — 84100 ASSAY OF PHOSPHORUS: CPT

## 2019-11-26 PROCEDURE — 80048 BASIC METABOLIC PNL TOTAL CA: CPT

## 2019-11-26 PROCEDURE — 36415 COLL VENOUS BLD VENIPUNCTURE: CPT

## 2019-11-26 PROCEDURE — 83970 ASSAY OF PARATHORMONE: CPT

## 2019-11-26 PROCEDURE — 87086 URINE CULTURE/COLONY COUNT: CPT

## 2019-12-03 ENCOUNTER — HOSPITAL ENCOUNTER (OUTPATIENT)
Dept: HOSPITAL 76 - LAB | Age: 29
Discharge: HOME | End: 2019-12-03
Attending: INTERNAL MEDICINE
Payer: MEDICAID

## 2019-12-03 DIAGNOSIS — N25.89: Primary | ICD-10-CM

## 2019-12-03 LAB
ANION GAP SERPL CALCULATED.4IONS-SCNC: 9 MMOL/L (ref 6–13)
BUN SERPL-MCNC: 17 MG/DL (ref 6–20)
CALCIUM UR-MCNC: 8.8 MG/DL (ref 8.5–10.3)
CHLORIDE SERPL-SCNC: 102 MMOL/L (ref 101–111)
CO2 SERPL-SCNC: 27 MMOL/L (ref 21–32)
CREAT SERPLBLD-SCNC: 1.1 MG/DL (ref 0.6–1.2)
GFRSERPLBLD MDRD-ARVRAT: 78 ML/MIN/{1.73_M2} (ref 89–?)
GLUCOSE SERPL-MCNC: 110 MG/DL (ref 70–100)
SODIUM SERPLBLD-SCNC: 138 MMOL/L (ref 135–145)

## 2019-12-03 PROCEDURE — 36415 COLL VENOUS BLD VENIPUNCTURE: CPT

## 2019-12-03 PROCEDURE — 80048 BASIC METABOLIC PNL TOTAL CA: CPT

## 2019-12-18 ENCOUNTER — HOSPITAL ENCOUNTER (OUTPATIENT)
Dept: HOSPITAL 76 - LAB | Age: 29
Discharge: HOME | End: 2019-12-18
Attending: INTERNAL MEDICINE
Payer: MEDICAID

## 2019-12-18 DIAGNOSIS — N25.89: Primary | ICD-10-CM

## 2019-12-18 LAB
ANION GAP SERPL CALCULATED.4IONS-SCNC: 13 MMOL/L (ref 6–13)
BUN SERPL-MCNC: 14 MG/DL (ref 6–20)
CALCIUM UR-MCNC: 8.7 MG/DL (ref 8.5–10.3)
CHLORIDE SERPL-SCNC: 100 MMOL/L (ref 101–111)
CO2 SERPL-SCNC: 23 MMOL/L (ref 21–32)
CREAT SERPLBLD-SCNC: 1.3 MG/DL (ref 0.6–1.2)
GFRSERPLBLD MDRD-ARVRAT: 64 ML/MIN/{1.73_M2} (ref 89–?)
GLUCOSE SERPL-MCNC: 104 MG/DL (ref 70–100)
SODIUM SERPLBLD-SCNC: 136 MMOL/L (ref 135–145)

## 2019-12-18 PROCEDURE — 80048 BASIC METABOLIC PNL TOTAL CA: CPT

## 2019-12-18 PROCEDURE — 36415 COLL VENOUS BLD VENIPUNCTURE: CPT

## 2019-12-20 ENCOUNTER — HOSPITAL ENCOUNTER (OUTPATIENT)
Dept: HOSPITAL 76 - LAB | Age: 29
Discharge: HOME | End: 2019-12-20
Attending: INTERNAL MEDICINE
Payer: MEDICAID

## 2019-12-20 DIAGNOSIS — N18.2: ICD-10-CM

## 2019-12-20 DIAGNOSIS — N25.89: Primary | ICD-10-CM

## 2019-12-20 LAB
ANION GAP SERPL CALCULATED.4IONS-SCNC: 11 MMOL/L (ref 6–13)
BUN SERPL-MCNC: 25 MG/DL (ref 6–20)
CALCIUM UR-MCNC: 8.7 MG/DL (ref 8.5–10.3)
CHLORIDE SERPL-SCNC: 99 MMOL/L (ref 101–111)
CO2 SERPL-SCNC: 24 MMOL/L (ref 21–32)
CREAT SERPLBLD-SCNC: 1.1 MG/DL (ref 0.6–1.2)
CREAT UR-SCNC: 31.7 MG/DL
GFRSERPLBLD MDRD-ARVRAT: 78 ML/MIN/{1.73_M2} (ref 89–?)
GLUCOSE SERPL-MCNC: 109 MG/DL (ref 70–100)
MICROALBUMIN UR-MCNC: 2.2 MG/DL (ref 0–300)
MICROALBUMIN/CREAT RATIO PNL UR: 69.4 UG/MG (ref ?–30)
SODIUM SERPLBLD-SCNC: 134 MMOL/L (ref 135–145)

## 2019-12-20 PROCEDURE — 82570 ASSAY OF URINE CREATININE: CPT

## 2019-12-20 PROCEDURE — 80048 BASIC METABOLIC PNL TOTAL CA: CPT

## 2019-12-20 PROCEDURE — 82043 UR ALBUMIN QUANTITATIVE: CPT

## 2019-12-20 PROCEDURE — 36415 COLL VENOUS BLD VENIPUNCTURE: CPT

## 2019-12-23 ENCOUNTER — HOSPITAL ENCOUNTER (OUTPATIENT)
Dept: HOSPITAL 76 - LAB | Age: 29
Discharge: HOME | End: 2019-12-23
Attending: INTERNAL MEDICINE
Payer: MEDICAID

## 2019-12-23 DIAGNOSIS — N25.89: Primary | ICD-10-CM

## 2019-12-23 LAB
ANION GAP SERPL CALCULATED.4IONS-SCNC: 10 MMOL/L (ref 6–13)
BUN SERPL-MCNC: 14 MG/DL (ref 6–20)
CALCIUM UR-MCNC: 8.6 MG/DL (ref 8.5–10.3)
CHLORIDE SERPL-SCNC: 102 MMOL/L (ref 101–111)
CO2 SERPL-SCNC: 23 MMOL/L (ref 21–32)
CREAT SERPLBLD-SCNC: 1.2 MG/DL (ref 0.6–1.2)
GFRSERPLBLD MDRD-ARVRAT: 71 ML/MIN/{1.73_M2} (ref 89–?)
GLUCOSE SERPL-MCNC: 100 MG/DL (ref 70–100)
SODIUM SERPLBLD-SCNC: 135 MMOL/L (ref 135–145)

## 2019-12-23 PROCEDURE — 80048 BASIC METABOLIC PNL TOTAL CA: CPT

## 2019-12-23 PROCEDURE — 36415 COLL VENOUS BLD VENIPUNCTURE: CPT

## 2020-01-07 ENCOUNTER — HOSPITAL ENCOUNTER (OUTPATIENT)
Dept: HOSPITAL 76 - LAB | Age: 30
Discharge: HOME | End: 2020-01-07
Attending: INTERNAL MEDICINE
Payer: MEDICAID

## 2020-01-07 DIAGNOSIS — N25.89: Primary | ICD-10-CM

## 2020-01-07 LAB
ANION GAP SERPL CALCULATED.4IONS-SCNC: 9 MMOL/L (ref 6–13)
BUN SERPL-MCNC: 13 MG/DL (ref 6–20)
CALCIUM UR-MCNC: 8.9 MG/DL (ref 8.5–10.3)
CHLORIDE SERPL-SCNC: 103 MMOL/L (ref 101–111)
CO2 SERPL-SCNC: 25 MMOL/L (ref 21–32)
CREAT SERPLBLD-SCNC: 1.1 MG/DL (ref 0.6–1.2)
GFRSERPLBLD MDRD-ARVRAT: 78 ML/MIN/{1.73_M2} (ref 89–?)
GLUCOSE SERPL-MCNC: 101 MG/DL (ref 70–100)
SODIUM SERPLBLD-SCNC: 137 MMOL/L (ref 135–145)

## 2020-01-07 PROCEDURE — 80048 BASIC METABOLIC PNL TOTAL CA: CPT

## 2020-01-07 PROCEDURE — 36415 COLL VENOUS BLD VENIPUNCTURE: CPT

## 2020-01-21 ENCOUNTER — HOSPITAL ENCOUNTER (OUTPATIENT)
Dept: HOSPITAL 76 - LAB | Age: 30
Discharge: HOME | End: 2020-01-21
Attending: INTERNAL MEDICINE
Payer: MEDICAID

## 2020-01-21 DIAGNOSIS — N25.89: Primary | ICD-10-CM

## 2020-01-21 LAB
ANION GAP SERPL CALCULATED.4IONS-SCNC: 13 MMOL/L (ref 6–13)
BUN SERPL-MCNC: 15 MG/DL (ref 6–20)
CALCIUM UR-MCNC: 8.7 MG/DL (ref 8.5–10.3)
CHLORIDE SERPL-SCNC: 94 MMOL/L (ref 101–111)
CO2 SERPL-SCNC: 24 MMOL/L (ref 21–32)
CREAT SERPLBLD-SCNC: 1.1 MG/DL (ref 0.6–1.2)
GFRSERPLBLD MDRD-ARVRAT: 78 ML/MIN/{1.73_M2} (ref 89–?)
GLUCOSE SERPL-MCNC: 91 MG/DL (ref 70–100)
SODIUM SERPLBLD-SCNC: 131 MMOL/L (ref 135–145)

## 2020-01-21 PROCEDURE — 36415 COLL VENOUS BLD VENIPUNCTURE: CPT

## 2020-01-21 PROCEDURE — 80048 BASIC METABOLIC PNL TOTAL CA: CPT

## 2020-02-04 ENCOUNTER — HOSPITAL ENCOUNTER (OUTPATIENT)
Dept: HOSPITAL 76 - LAB | Age: 30
Discharge: HOME | End: 2020-02-04
Attending: INTERNAL MEDICINE
Payer: MEDICAID

## 2020-02-04 DIAGNOSIS — N25.89: Primary | ICD-10-CM

## 2020-02-04 LAB
ANION GAP SERPL CALCULATED.4IONS-SCNC: 10 MMOL/L (ref 6–13)
BUN SERPL-MCNC: 12 MG/DL (ref 6–20)
CALCIUM UR-MCNC: 8.6 MG/DL (ref 8.5–10.3)
CHLORIDE SERPL-SCNC: 101 MMOL/L (ref 101–111)
CO2 SERPL-SCNC: 26 MMOL/L (ref 21–32)
CREAT SERPLBLD-SCNC: 1.1 MG/DL (ref 0.6–1.2)
GFRSERPLBLD MDRD-ARVRAT: 78 ML/MIN/{1.73_M2} (ref 89–?)
GLUCOSE SERPL-MCNC: 111 MG/DL (ref 70–100)
SODIUM SERPLBLD-SCNC: 137 MMOL/L (ref 135–145)

## 2020-02-04 PROCEDURE — 80048 BASIC METABOLIC PNL TOTAL CA: CPT

## 2020-02-04 PROCEDURE — 36415 COLL VENOUS BLD VENIPUNCTURE: CPT

## 2020-02-18 ENCOUNTER — HOSPITAL ENCOUNTER (OUTPATIENT)
Dept: HOSPITAL 76 - LAB | Age: 30
Discharge: HOME | End: 2020-02-18
Attending: INTERNAL MEDICINE
Payer: MEDICAID

## 2020-02-18 DIAGNOSIS — N25.89: Primary | ICD-10-CM

## 2020-02-18 LAB
ANION GAP SERPL CALCULATED.4IONS-SCNC: 10 MMOL/L (ref 6–13)
BUN SERPL-MCNC: 13 MG/DL (ref 6–20)
CALCIUM UR-MCNC: 9.2 MG/DL (ref 8.5–10.3)
CHLORIDE SERPL-SCNC: 99 MMOL/L (ref 101–111)
CO2 SERPL-SCNC: 26 MMOL/L (ref 21–32)
CREAT SERPLBLD-SCNC: 1.2 MG/DL (ref 0.6–1.2)
GFRSERPLBLD MDRD-ARVRAT: 71 ML/MIN/{1.73_M2} (ref 89–?)
GLUCOSE SERPL-MCNC: 115 MG/DL (ref 70–100)
SODIUM SERPLBLD-SCNC: 135 MMOL/L (ref 135–145)

## 2020-02-18 PROCEDURE — 36415 COLL VENOUS BLD VENIPUNCTURE: CPT

## 2020-02-18 PROCEDURE — 80048 BASIC METABOLIC PNL TOTAL CA: CPT

## 2020-02-25 ENCOUNTER — HOSPITAL ENCOUNTER (OUTPATIENT)
Dept: HOSPITAL 76 - LAB | Age: 30
Discharge: HOME | End: 2020-02-25
Attending: INTERNAL MEDICINE
Payer: MEDICAID

## 2020-02-25 DIAGNOSIS — N25.89: Primary | ICD-10-CM

## 2020-02-25 LAB
ANION GAP SERPL CALCULATED.4IONS-SCNC: 9 MMOL/L (ref 6–13)
BUN SERPL-MCNC: 12 MG/DL (ref 6–20)
CALCIUM UR-MCNC: 8.8 MG/DL (ref 8.5–10.3)
CHLORIDE SERPL-SCNC: 104 MMOL/L (ref 101–111)
CO2 SERPL-SCNC: 21 MMOL/L (ref 21–32)
CREAT SERPLBLD-SCNC: 1 MG/DL (ref 0.6–1.2)
GFRSERPLBLD MDRD-ARVRAT: 87 ML/MIN/{1.73_M2} (ref 89–?)
GLUCOSE SERPL-MCNC: 100 MG/DL (ref 70–100)
SODIUM SERPLBLD-SCNC: 134 MMOL/L (ref 135–145)

## 2020-02-25 PROCEDURE — 36415 COLL VENOUS BLD VENIPUNCTURE: CPT

## 2020-02-25 PROCEDURE — 80048 BASIC METABOLIC PNL TOTAL CA: CPT

## 2020-03-10 ENCOUNTER — HOSPITAL ENCOUNTER (OUTPATIENT)
Dept: HOSPITAL 76 - LAB | Age: 30
Discharge: HOME | End: 2020-03-10
Attending: INTERNAL MEDICINE
Payer: MEDICAID

## 2020-03-10 DIAGNOSIS — N25.89: Primary | ICD-10-CM

## 2020-03-10 LAB
ANION GAP SERPL CALCULATED.4IONS-SCNC: 11 MMOL/L (ref 6–13)
BUN SERPL-MCNC: 15 MG/DL (ref 6–20)
CALCIUM UR-MCNC: 9.3 MG/DL (ref 8.5–10.3)
CHLORIDE SERPL-SCNC: 100 MMOL/L (ref 101–111)
CO2 SERPL-SCNC: 22 MMOL/L (ref 21–32)
CREAT SERPLBLD-SCNC: 1.1 MG/DL (ref 0.6–1.2)
GFRSERPLBLD MDRD-ARVRAT: 78 ML/MIN/{1.73_M2} (ref 89–?)
GLUCOSE SERPL-MCNC: 92 MG/DL (ref 70–100)
SODIUM SERPLBLD-SCNC: 133 MMOL/L (ref 135–145)

## 2020-03-10 PROCEDURE — 36415 COLL VENOUS BLD VENIPUNCTURE: CPT

## 2020-03-10 PROCEDURE — 80048 BASIC METABOLIC PNL TOTAL CA: CPT

## 2020-07-21 ENCOUNTER — HOSPITAL ENCOUNTER (OUTPATIENT)
Dept: HOSPITAL 76 - LAB | Age: 30
Discharge: HOME | End: 2020-07-21
Attending: INTERNAL MEDICINE
Payer: MEDICAID

## 2020-07-21 DIAGNOSIS — N25.89: Primary | ICD-10-CM

## 2020-07-21 LAB
ANION GAP SERPL CALCULATED.4IONS-SCNC: 11 MMOL/L (ref 6–13)
BUN SERPL-MCNC: 17 MG/DL (ref 6–20)
CALCIUM UR-MCNC: 9.4 MG/DL (ref 8.5–10.3)
CHLORIDE SERPL-SCNC: 106 MMOL/L (ref 101–111)
CO2 SERPL-SCNC: 23 MMOL/L (ref 21–32)
CREAT SERPLBLD-SCNC: 1.3 MG/DL (ref 0.6–1.2)
GLUCOSE SERPL-MCNC: 115 MG/DL (ref 70–100)
SODIUM SERPLBLD-SCNC: 140 MMOL/L (ref 135–145)

## 2020-07-21 PROCEDURE — 80048 BASIC METABOLIC PNL TOTAL CA: CPT

## 2020-07-21 PROCEDURE — 36415 COLL VENOUS BLD VENIPUNCTURE: CPT

## 2020-10-05 ENCOUNTER — HOSPITAL ENCOUNTER (OUTPATIENT)
Dept: HOSPITAL 76 - LAB | Age: 30
Discharge: HOME | End: 2020-10-05
Attending: INTERNAL MEDICINE
Payer: MEDICAID

## 2020-10-05 DIAGNOSIS — E87.6: Primary | ICD-10-CM

## 2020-10-05 LAB
ANION GAP SERPL CALCULATED.4IONS-SCNC: 11 MMOL/L (ref 6–13)
BUN SERPL-MCNC: 21 MG/DL (ref 6–20)
CALCIUM UR-MCNC: 8.9 MG/DL (ref 8.5–10.3)
CHLORIDE SERPL-SCNC: 103 MMOL/L (ref 101–111)
CO2 SERPL-SCNC: 24 MMOL/L (ref 21–32)
CREAT SERPLBLD-SCNC: 1.3 MG/DL (ref 0.6–1.2)
CREAT UR-SCNC: 67.9 MG/DL
GLUCOSE SERPL-MCNC: 103 MG/DL (ref 70–100)
MAGNESIUM SERPL-MCNC: 2 MG/DL (ref 1.7–2.8)
MICROALBUMIN UR-MCNC: 0.7 MG/DL (ref 0–300)
MICROALBUMIN/CREAT RATIO PNL UR: 10.3 UG/MG (ref ?–30)
SODIUM SERPLBLD-SCNC: 138 MMOL/L (ref 135–145)

## 2020-10-05 PROCEDURE — 82570 ASSAY OF URINE CREATININE: CPT

## 2020-10-05 PROCEDURE — 83735 ASSAY OF MAGNESIUM: CPT

## 2020-10-05 PROCEDURE — 84300 ASSAY OF URINE SODIUM: CPT

## 2020-10-05 PROCEDURE — 80048 BASIC METABOLIC PNL TOTAL CA: CPT

## 2020-10-05 PROCEDURE — 82436 ASSAY OF URINE CHLORIDE: CPT

## 2020-10-05 PROCEDURE — 82310 ASSAY OF CALCIUM: CPT

## 2020-10-05 PROCEDURE — 81599 UNLISTED MAAA: CPT

## 2020-10-05 PROCEDURE — 82306 VITAMIN D 25 HYDROXY: CPT

## 2020-10-05 PROCEDURE — 36415 COLL VENOUS BLD VENIPUNCTURE: CPT

## 2020-10-05 PROCEDURE — 84133 ASSAY OF URINE POTASSIUM: CPT

## 2020-10-05 PROCEDURE — 82043 UR ALBUMIN QUANTITATIVE: CPT

## 2021-01-19 ENCOUNTER — HOSPITAL ENCOUNTER (OUTPATIENT)
Dept: HOSPITAL 76 - LAB | Age: 31
Discharge: HOME | End: 2021-01-19
Attending: INTERNAL MEDICINE
Payer: MEDICAID

## 2021-01-19 DIAGNOSIS — N18.2: Primary | ICD-10-CM

## 2021-01-19 LAB
ANION GAP SERPL CALCULATED.4IONS-SCNC: 12 MMOL/L (ref 6–13)
BUN SERPL-MCNC: 28 MG/DL (ref 6–20)
CALCIUM UR-MCNC: 9.9 MG/DL (ref 8.5–10.3)
CHLORIDE SERPL-SCNC: 100 MMOL/L (ref 101–111)
CLARITY UR REFRACT.AUTO: CLEAR
CO2 SERPL-SCNC: 22 MMOL/L (ref 21–32)
CREAT SERPLBLD-SCNC: 1.2 MG/DL (ref 0.6–1.2)
CREAT UR-SCNC: 60.4 MG/DL
GLUCOSE SERPL-MCNC: 116 MG/DL (ref 70–100)
GLUCOSE UR QL STRIP.AUTO: NEGATIVE MG/DL
KETONES UR QL STRIP.AUTO: NEGATIVE MG/DL
MAGNESIUM SERPL-MCNC: 2.2 MG/DL (ref 1.7–2.8)
MICROALBUMIN UR-MCNC: 4.6 MG/DL (ref 0–300)
MICROALBUMIN/CREAT RATIO PNL UR: 76.2 UG/MG (ref ?–30)
NITRITE UR QL STRIP.AUTO: NEGATIVE
PH UR STRIP.AUTO: 7 PH (ref 5–7.5)
PHOSPHATE BLD-MCNC: 3.1 MG/DL (ref 2.5–4.6)
PROT UR STRIP.AUTO-MCNC: NEGATIVE MG/DL
RBC # UR STRIP.AUTO: (no result) /UL
RBC # URNS HPF: (no result) /HPF (ref 0–5)
SODIUM SERPLBLD-SCNC: 134 MMOL/L (ref 135–145)
SP GR UR STRIP.AUTO: 1.01 (ref 1–1.03)
SQUAMOUS #/AREA URNS HPF: (no result) /HPF
SQUAMOUS URNS QL MICRO: (no result)
URATE SERPL-MCNC: 5.7 MG/DL (ref 2.6–7.2)
UROBILINOGEN UR QL STRIP.AUTO: (no result) E.U./DL
UROBILINOGEN UR STRIP.AUTO-MCNC: NEGATIVE MG/DL

## 2021-01-19 PROCEDURE — 82570 ASSAY OF URINE CREATININE: CPT

## 2021-01-19 PROCEDURE — 84550 ASSAY OF BLOOD/URIC ACID: CPT

## 2021-01-19 PROCEDURE — 36415 COLL VENOUS BLD VENIPUNCTURE: CPT

## 2021-01-19 PROCEDURE — 83970 ASSAY OF PARATHORMONE: CPT

## 2021-01-19 PROCEDURE — 83735 ASSAY OF MAGNESIUM: CPT

## 2021-01-19 PROCEDURE — 84100 ASSAY OF PHOSPHORUS: CPT

## 2021-01-19 PROCEDURE — 82043 UR ALBUMIN QUANTITATIVE: CPT

## 2021-01-19 PROCEDURE — 80048 BASIC METABOLIC PNL TOTAL CA: CPT

## 2021-01-19 PROCEDURE — 81001 URINALYSIS AUTO W/SCOPE: CPT

## 2021-01-19 PROCEDURE — 82306 VITAMIN D 25 HYDROXY: CPT

## 2021-01-28 ENCOUNTER — HOSPITAL ENCOUNTER (OUTPATIENT)
Dept: HOSPITAL 76 - LAB | Age: 31
Discharge: HOME | End: 2021-01-28
Attending: INTERNAL MEDICINE
Payer: MEDICAID

## 2021-01-28 DIAGNOSIS — E87.6: Primary | ICD-10-CM

## 2021-01-28 LAB
ANION GAP SERPL CALCULATED.4IONS-SCNC: 16 MMOL/L (ref 6–13)
BUN SERPL-MCNC: 21 MG/DL (ref 6–20)
CALCIUM UR-MCNC: 9.9 MG/DL (ref 8.5–10.3)
CHLORIDE SERPL-SCNC: 97 MMOL/L (ref 101–111)
CO2 SERPL-SCNC: 22 MMOL/L (ref 21–32)
CREAT SERPLBLD-SCNC: 1.3 MG/DL (ref 0.6–1.2)
GLUCOSE SERPL-MCNC: 122 MG/DL (ref 70–100)
MAGNESIUM SERPL-MCNC: 2.3 MG/DL (ref 1.7–2.8)

## 2021-01-28 PROCEDURE — 36415 COLL VENOUS BLD VENIPUNCTURE: CPT

## 2021-01-28 PROCEDURE — 80048 BASIC METABOLIC PNL TOTAL CA: CPT

## 2021-01-28 PROCEDURE — 83735 ASSAY OF MAGNESIUM: CPT

## 2021-02-11 ENCOUNTER — HOSPITAL ENCOUNTER (OUTPATIENT)
Dept: HOSPITAL 76 - LAB | Age: 31
Discharge: HOME | End: 2021-02-11
Attending: INTERNAL MEDICINE
Payer: MEDICAID

## 2021-02-11 DIAGNOSIS — E83.42: ICD-10-CM

## 2021-02-11 DIAGNOSIS — E87.6: Primary | ICD-10-CM

## 2021-02-11 LAB
ANION GAP SERPL CALCULATED.4IONS-SCNC: 12 MMOL/L (ref 6–13)
BUN SERPL-MCNC: 28 MG/DL (ref 6–20)
CALCIUM UR-MCNC: 8.9 MG/DL (ref 8.5–10.3)
CHLORIDE SERPL-SCNC: 90 MMOL/L (ref 101–111)
CO2 SERPL-SCNC: 21 MMOL/L (ref 21–32)
CREAT SERPLBLD-SCNC: 1.3 MG/DL (ref 0.6–1.2)
GLUCOSE SERPL-MCNC: 111 MG/DL (ref 70–100)
MAGNESIUM SERPL-MCNC: 2.1 MG/DL (ref 1.7–2.8)

## 2021-02-11 PROCEDURE — 80048 BASIC METABOLIC PNL TOTAL CA: CPT

## 2021-02-11 PROCEDURE — 83735 ASSAY OF MAGNESIUM: CPT

## 2021-02-11 PROCEDURE — 36415 COLL VENOUS BLD VENIPUNCTURE: CPT

## 2021-02-12 ENCOUNTER — HOSPITAL ENCOUNTER (INPATIENT)
Dept: HOSPITAL 76 - ED | Age: 31
LOS: 3 days | Discharge: LEFT BEFORE DISCHARGE | DRG: 699 | End: 2021-02-15
Attending: INTERNAL MEDICINE | Admitting: INTERNAL MEDICINE
Payer: MEDICAID

## 2021-02-12 DIAGNOSIS — Z20.822: ICD-10-CM

## 2021-02-12 DIAGNOSIS — D72.829: ICD-10-CM

## 2021-02-12 DIAGNOSIS — Z53.29: ICD-10-CM

## 2021-02-12 DIAGNOSIS — N25.89: Primary | ICD-10-CM

## 2021-02-12 DIAGNOSIS — E87.6: ICD-10-CM

## 2021-02-12 DIAGNOSIS — F17.200: ICD-10-CM

## 2021-02-12 DIAGNOSIS — E66.9: ICD-10-CM

## 2021-02-12 DIAGNOSIS — E87.1: ICD-10-CM

## 2021-02-12 DIAGNOSIS — Z79.899: ICD-10-CM

## 2021-02-12 LAB
ALBUMIN DIAFP-MCNC: 3.9 G/DL (ref 3.2–5.5)
ALBUMIN/GLOB SERPL: 1.1 {RATIO} (ref 1–2.2)
ALP SERPL-CCNC: 117 IU/L (ref 42–121)
ALT SERPL W P-5'-P-CCNC: 47 IU/L (ref 10–60)
ANION GAP SERPL CALCULATED.4IONS-SCNC: 17 MMOL/L (ref 6–13)
AST SERPL W P-5'-P-CCNC: 27 IU/L (ref 10–42)
BASOPHILS NFR BLD AUTO: 0.1 10^3/UL (ref 0–0.1)
BASOPHILS NFR BLD AUTO: 0.5 %
BILIRUB BLD-MCNC: 0.5 MG/DL (ref 0.2–1)
BUN SERPL-MCNC: 19 MG/DL (ref 6–20)
C PNEUM DNA NPH QL NAA+NON-PROBE: NOT DETECTED
CALCIUM UR-MCNC: 8.9 MG/DL (ref 8.5–10.3)
CHLORIDE SERPL-SCNC: 91 MMOL/L (ref 101–111)
CLARITY UR REFRACT.AUTO: CLEAR
CO2 SERPL-SCNC: 19 MMOL/L (ref 21–32)
CREAT SERPLBLD-SCNC: 1.1 MG/DL (ref 0.6–1.2)
EOSINOPHIL # BLD AUTO: 0.5 10^3/UL (ref 0–0.7)
EOSINOPHIL NFR BLD AUTO: 2.6 %
ERYTHROCYTE [DISTWIDTH] IN BLOOD BY AUTOMATED COUNT: 12.8 % (ref 12–15)
GLOBULIN SER-MCNC: 3.5 G/DL (ref 2.1–4.2)
GLUCOSE SERPL-MCNC: 119 MG/DL (ref 70–100)
GLUCOSE UR QL STRIP.AUTO: NEGATIVE MG/DL
HGB UR QL STRIP: 17.3 G/DL (ref 14–18)
KETONES UR QL STRIP.AUTO: NEGATIVE MG/DL
LIPASE SERPL-CCNC: 75 U/L (ref 22–51)
LYMPHOCYTES # SPEC AUTO: 3 10^3/UL (ref 1.5–3.5)
LYMPHOCYTES NFR BLD AUTO: 15.8 %
MCH RBC QN AUTO: 29.9 PG (ref 27–31)
MCHC RBC AUTO-ENTMCNC: 35.5 G/DL (ref 32–36)
MCV RBC AUTO: 84.3 FL (ref 80–94)
MONOCYTES # BLD AUTO: 1.2 10^3/UL (ref 0–1)
MONOCYTES NFR BLD AUTO: 6.3 %
NEUTROPHILS # BLD AUTO: 14.1 10^3/UL (ref 1.5–6.6)
NEUTROPHILS # SNV AUTO: 19.2 X10^3/UL (ref 4.8–10.8)
NEUTROPHILS NFR BLD AUTO: 73.1 %
NITRITE UR QL STRIP.AUTO: NEGATIVE
PDW BLD AUTO: 7.9 FL (ref 7.4–11.4)
PH UR STRIP.AUTO: 7.5 PH (ref 5–7.5)
PLATELET # BLD: 390 10^3/UL (ref 130–450)
PROT SPEC-MCNC: 7.4 G/DL (ref 6.7–8.2)
PROT UR STRIP.AUTO-MCNC: NEGATIVE MG/DL
RBC # UR STRIP.AUTO: NEGATIVE /UL
RBC MAR: 5.79 10^6/UL (ref 4.7–6.1)
SP GR UR STRIP.AUTO: 1.02 (ref 1–1.03)
UROBILINOGEN UR QL STRIP.AUTO: (no result) E.U./DL
UROBILINOGEN UR STRIP.AUTO-MCNC: NEGATIVE MG/DL

## 2021-02-12 PROCEDURE — 83690 ASSAY OF LIPASE: CPT

## 2021-02-12 PROCEDURE — 81003 URINALYSIS AUTO W/O SCOPE: CPT

## 2021-02-12 PROCEDURE — 96365 THER/PROPH/DIAG IV INF INIT: CPT

## 2021-02-12 PROCEDURE — 80053 COMPREHEN METABOLIC PANEL: CPT

## 2021-02-12 PROCEDURE — 87086 URINE CULTURE/COLONY COUNT: CPT

## 2021-02-12 PROCEDURE — 36415 COLL VENOUS BLD VENIPUNCTURE: CPT

## 2021-02-12 PROCEDURE — 93005 ELECTROCARDIOGRAM TRACING: CPT

## 2021-02-12 PROCEDURE — 85025 COMPLETE CBC W/AUTO DIFF WBC: CPT

## 2021-02-12 PROCEDURE — 99285 EMERGENCY DEPT VISIT HI MDM: CPT

## 2021-02-12 PROCEDURE — 83735 ASSAY OF MAGNESIUM: CPT

## 2021-02-12 PROCEDURE — 99283 EMERGENCY DEPT VISIT LOW MDM: CPT

## 2021-02-12 PROCEDURE — 0202U NFCT DS 22 TRGT SARS-COV-2: CPT

## 2021-02-12 PROCEDURE — 80048 BASIC METABOLIC PNL TOTAL CA: CPT

## 2021-02-12 PROCEDURE — 81001 URINALYSIS AUTO W/SCOPE: CPT

## 2021-02-12 RX ADMIN — SODIUM CHLORIDE SCH MLS/HR: 9 INJECTION, SOLUTION INTRAVENOUS at 21:50

## 2021-02-12 RX ADMIN — POTASSIUM CHLORIDE SCH MLS/HR: 7.46 INJECTION, SOLUTION INTRAVENOUS at 23:05

## 2021-02-12 RX ADMIN — POTASSIUM CHLORIDE SCH MLS/HR: 7.46 INJECTION, SOLUTION INTRAVENOUS at 21:50

## 2021-02-12 RX ADMIN — POTASSIUM CHLORIDE SCH MEQ: 1500 TABLET, EXTENDED RELEASE ORAL at 21:49

## 2021-02-12 RX ADMIN — SPIRONOLACTONE SCH MG: 25 TABLET, FILM COATED ORAL at 21:50

## 2021-02-12 RX ADMIN — FAMOTIDINE SCH MG: 10 INJECTION INTRAVENOUS at 21:49

## 2021-02-12 NOTE — HISTORY & PHYSICAL EXAMINATION
History of Present Illness





- Admitted From


Admitted From:: home





- History of Present Illness


HPI Comment/Other: 





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

and found to be hypokalemic (K 2.5 and Na 123)


History of RTA type 2, and has been admitted here in past w/ severe hypokalemia 

last in July/ 2018


 PMH includes depresion, anxiety disorder, tobacco abuse, history of 

noncomplance w/ medication including leaving AM , but of note he has not been 

admitted since July 2018 here at [facility]. 





History





- Past Medical History


Cardiovascular: reports: None


Respiratory: reports: None


Neuro: reports: None


Endocrine/Autoimmune: reports: None


GI: reports: None


: reports: None, Other (Renal tubular acidosis)


HEENT: reports: None


Psych: reports: Depression, Anxiety


Musculoskeletal: reports: None


Derm: reports: None, Other


MRSA Hx?: No





- Family & Social History


Family History: Mother: Alive and Well, Father: Alive and Well


Family History Comment/Other: Patients half sister has Gitelmans disease.  He 

and family originally from [place], and part of a native [place]. The family moved 

to [facility] ~ 5 yrs ago. For work;


Social History Notes: Patient is currently living in a trailer near [place] 

[facility] and lives with his parents. He and his family are originally from 

[place]. He is part of a native [place] out of [place]. The patient moved to [facility] with his family more than 3 years ago. He suffers from a number of mental

health issues including depression, anxiety, and anger disorder. The patient is 

unemployed. The patient states his and family's thing about moving out to 

[place]. He continues to smoke half a pack a day. He occasionaly smoke 

marijuana and denies any alcohol or other drug use.





- Substance History


Use: Uses substance without health or social issues: Tobacco (smoked 1/2 PPd 

2018; now in 2021; ), Cannabis





- POLST


Patient has POLST: No


POLST Status: Full Code





Meds/Allgy





- Home Medications


Home Medications: 


                                Ambulatory Orders











 Medication  Instructions  Recorded  Confirmed


 


Potassium Chloride [Klor-Con M20] 120 meq PO QID #120 tab.er.prt 07/14/18 02/12/21


 


Spironolactone 12.5 mg PO BID 10/16/19 02/12/21


 


Lisinopril [Zestril] 2.5 mg PO DAILY 02/12/21 02/12/21














- Allergies


Allergies/Adverse Reactions: 


                                    Allergies











Allergy/AdvReac Type Severity Reaction Status Date / Time


 


No Known Drug Allergies Allergy   Verified 10/16/19 15:07














Exam





- Vital Signs


Vital Signs: 





                                Vital Signs x48h











  Temp Pulse Resp BP Pulse Ox


 


 02/12/21 17:30  36.7 C  98  18  130/72  95

## 2021-02-12 NOTE — HISTORY & PHYSICAL EXAMINATION
DATE OF SERVICE: 02/12/2021

Physician: Jaclyn Patricia MD

 

HISTORY OF PRESENT ILLNESS:  This is a 30-year-old male of  descent, who
has a history of Gitelman syndrome, severe hypokalemia recurrently and requiring
very high doses of supplement daily.  He used to be followed by Nephrologist, 
Dr. Villareal, here, but now sees a Nephrologist, Dr. Jj. In the past, there 
have been multiple episodes of noncompliance when he stops taking his 
medications or states that he has run out of them and then presents here with 
severe hypokalemia with symptoms of just fatigue.  He denies any other side 
effects from low potassium, such as palpitations or syncope.  He presents now 
again with having not taken his medicines because they have run out and the 
doses were being changed around, he states.  He had outpatient labs drawn and 
Dr. Jj called the ER doctor stating that this patient was coming in 
because of low potassium.  He has no complaints for me whatsoever.

 

ALLERGIES:  NONE.

 

MEDICATIONS  

1.  Potassium chloride 80 mg p.o. q.i.d.

2.  Lisinopril 2.5 mg daily.

3.  Spironolactone 12.5 mg b.i.d.

 

FAMILY HISTORY:  His sister also has Gitelman syndrome with admissions for low 
potassium.

 

SOCIAL HISTORY:  He denies any drug use.

 

REVIEW OF SYSTEMS:  A comprehensive review of systems was performed and the 
pertinent positives are listed, the rest are negative.

 

PHYSICAL EXAM  

GENERAL:  Obese, young male, who is in no distress.

VITAL SIGNS:  Blood pressure 120/74, pulse of 90 in sinus rhythm, afebrile, room
air saturation 99%.

HEENT:  Unremarkable.  He has moist oral mucosa and good dentition.

NECK:  Shows no JVD in a supine position.

CHEST:  Clear.

HEART:  Normal heart sounds.

ABDOMEN:  Obese.  Positive bowel sounds, nontender.

EXTREMITIES:  No clubbing, cyanosis, or edema.  There is a burn roberto over his 
left medial thumb on the skin, grade 1.  His skin is otherwise warm and dry.

NEUROLOGIC:  Grossly intact.

 

LABORATORY DATA:  Sodium 127, potassium 2.1, chloride 91, carbon dioxide 19, 
anion gap 17, BUN 19, creatinine 1.1.  Normal liver tests.  Lipase elevated at 
75.  White blood count 19.2, hemoglobin 17.3, platelet count 390.  Urine was 
unremarkable.  His respiratory test for COVID was negative.

 

No imaging was done or EKG done.

 

IMPRESSION/DIAGNOSES

1.  Gitelman syndrome.

2.  Hypokalemia.

3.  Hyponatremia.

4.  History of past noncompliance.

5.  Elevated white blood count, presumably a phase reactant from stress, since 
no areas appear to have a source of infection.

 

PLAN:  Admit the patient on telemetry to the Hospitalist service.  Start with 
replacement of potassium as advised by Dr. Jj, which was to give IV and 
p.o. potassium, no IV fluids are needed, check his potassium and sodium every 4-
6 hours for at least 24 hours and when he is ready to discharge, continue with 
80 mEq of potassium q.i.d.  Continue his other meds.  Will order a regular diet.
 Activity can be as tolerated.

 

CODE STATUS:  FULL CODE.

 

DEEP VENOUS THROMBOSIS PROPHYLAXIS:  SCDs.

 

ATTESTATION:  Patient is expected to be discharged or transferred to another 
facility within 96 hours:  Yes.

 

 

cc: Manuel Jj MD

DD: 02/12/2021 22:39

TD: 02/12/2021 22:41

Job #: [id number]

MTDD

## 2021-02-12 NOTE — ED PHYSICIAN DOCUMENTATION
History of Present Illness





- Stated complaint


Stated Complaint: FELLING TIRED





- Chief complaint


Chief Complaint: General





- History obtained from


History obtained from: Patient, Other (nephrologist Dr. Dixon)





- Additonal information


Additional information: 





30-year-old man sent in for admission for severe hypokalemia secondary to 

Gitelman syndrome.  Dr. Dixon called ahead in regards to his potassium of 2.8 

and sodium of 123 requesting that he be admitted for potassium oral and IV 

repletion.  He should have sodium checks every 4-6 hours for 24 hours and then 

should be discharged with his 80 mEq 4 times daily of potassium.  Dr. Rodriguez is 

on call this weekend for nephrology at [facility] and will be available for consult.







Review of Systems


Ten Systems: 10 systems reviewed and negative


Constitutional: reports: Myalgias.  denies: Fever, Chills


Cardiac: denies: Chest pain / pressure


Respiratory: denies: Dyspnea


GI: denies: Abdominal Pain, Nausea


Neurologic: reports: Generalized weakness





PD PAST MEDICAL HISTORY





- Past Medical History


Cardiovascular: None


Respiratory: None


Neuro: None


Endocrine/Autoimmune: None


GI: None


GYN: Other (Renal tubular acidosis)


: None, Other (Renal tubular acidosis)


HEENT: None


Psych: Depression, Anxiety


Musculoskeletal: None


Derm: None, Other





- Past Surgical History


Past Surgical History: Yes





- Present Medications


Home Medications: 


                                Ambulatory Orders











 Medication  Instructions  Recorded  Confirmed


 


Potassium Chloride [Klor-Con M20] 120 meq PO QID #120 tab.er.prt 07/14/18 02/12/21


 


Spironolactone 12.5 mg PO BID 10/16/19 02/12/21


 


Lisinopril [Zestril] 2.5 mg PO DAILY 02/12/21 02/12/21














- Allergies


Allergies/Adverse Reactions: 


                                    Allergies











Allergy/AdvReac Type Severity Reaction Status Date / Time


 


No Known Drug Allergies Allergy   Verified 10/16/19 15:07














- Social History


Does the pt smoke?: Yes


Smoking Status: Current every day smoker


Does the pt drink ETOH?: No


Does the pt have substance abuse?: Yes





- Immunizations


Immunizations are current?: Yes





- POLST


Patient has POLST: No


POLST Status: Full Code





PD ED PE NORMAL





- Vitals


Vital signs reviewed: Yes





- General


General: Alert and oriented X 3, No acute distress





- HEENT


HEENT: Atraumatic, PERRL, EOMI





- Neck


Neck: Supple, no meningeal sign





- Cardiac


Cardiac: RRR





- Respiratory


Respiratory: No respiratory distress, Clear bilaterally





- Abdomen


Abdomen: Non tender, Non distended





- Male 


Male : Deferred





- Rectal


Rectal: Deferred





- Back


Back: No spinal TTP





- Derm


Derm: Normal color





- Extremities


Extremities: No deformity





- Neuro


Neuro: Alert and oriented X 3





- Psych


Psych: Normal mood, Normal affect





Results





- Vitals


Vitals: 


                               Vital Signs - 24 hr











  02/12/21 02/12/21 02/12/21





  17:30 19:00 19:30


 


Temperature 36.7 C  


 


Heart Rate 98 90 80


 


Respiratory 18 18 28 H





Rate   


 


Blood Pressure 130/72 120/74 115/72


 


O2 Saturation 95 99 97








                                     Oxygen











O2 Source                      Room air

















- EKG (time done)


  ** 1836


Rate: Rate (enter#) (83)


Rhythm: NSR


Axis: Normal


Intervals: Normal PA


QRS: Normal


Ischemia: T wave inversion (inferior leads)





- Labs


Labs: 


                                Laboratory Tests











  02/12/21 02/12/21 02/12/21





  18:35 19:05 19:05


 


WBC   19.2 H 


 


RBC   5.79 


 


Hgb   17.3 


 


Hct   48.8 


 


MCV   84.3 


 


MCH   29.9 


 


MCHC   35.5 


 


RDW   12.8 


 


Plt Count   390 


 


MPV   7.9 


 


Neut # (Auto)   14.1 H 


 


Lymph # (Auto)   3.0 


 


Mono # (Auto)   1.2 H 


 


Eos # (Auto)   0.5 


 


Baso # (Auto)   0.1 


 


Absolute Nucleated RBC   0.00 


 


Nucleated RBC %   0.0 


 


Sodium    127 L


 


Potassium    2.1 L*


 


Chloride    91 L


 


Carbon Dioxide    19 L


 


Anion Gap    17.0 H


 


BUN    19


 


Creatinine    1.1


 


Estimated GFR (MDRD)    78 L


 


Glucose    119 H


 


Calcium    8.9


 


Total Bilirubin    0.5


 


AST    27


 


ALT    47


 


Alkaline Phosphatase    117


 


Total Protein    7.4


 


Albumin    3.9


 


Globulin    3.5


 


Albumin/Globulin Ratio    1.1


 


Lipase    75 H


 


Urine Color  YELLOW  


 


Urine Clarity  CLEAR  


 


Urine pH  7.5  


 


Ur Specific Gravity  1.020  


 


Urine Protein  NEGATIVE  


 


Urine Glucose (UA)  NEGATIVE  


 


Urine Ketones  NEGATIVE  


 


Urine Occult Blood  NEGATIVE  


 


Urine Nitrite  NEGATIVE  


 


Urine Bilirubin  NEGATIVE  


 


Urine Urobilinogen  0.2 (NORMAL)  


 


Ur Leukocyte Esterase  NEGATIVE  


 


Ur Microscopic Review  NOT INDICATED  


 


Urine Culture Comments  NOT INDICATED  














PD MEDICAL DECISION MAKING





- ED course


ED course: 





30-year-old man sent in by Dr. Dixon for admission.  Discussed with Dr. Leon 

who will admit for IV and oral potassium supplementation.  Alerted nighttime doc

 to critical potassium value.  





Departure





- Departure


Disposition: 64 [facility] DC/Xfer


Clinical Impression: 


 Hypokalemia, Hyponatremia, Generalized weakness

## 2021-02-13 LAB
ANION GAP SERPL CALCULATED.4IONS-SCNC: 12 MMOL/L (ref 6–13)
ANION GAP SERPL CALCULATED.4IONS-SCNC: 13 MMOL/L (ref 6–13)
ANION GAP SERPL CALCULATED.4IONS-SCNC: 18 MMOL/L (ref 6–13)
ANION GAP SERPL CALCULATED.4IONS-SCNC: 18 MMOL/L (ref 6–13)
BASOPHILS NFR BLD AUTO: 0.1 10^3/UL (ref 0–0.1)
BASOPHILS NFR BLD AUTO: 0.6 %
BUN SERPL-MCNC: 17 MG/DL (ref 6–20)
BUN SERPL-MCNC: 18 MG/DL (ref 6–20)
BUN SERPL-MCNC: 18 MG/DL (ref 6–20)
BUN SERPL-MCNC: 19 MG/DL (ref 6–20)
CALCIUM UR-MCNC: 8.5 MG/DL (ref 8.5–10.3)
CALCIUM UR-MCNC: 8.8 MG/DL (ref 8.5–10.3)
CALCIUM UR-MCNC: 8.9 MG/DL (ref 8.5–10.3)
CALCIUM UR-MCNC: 9.1 MG/DL (ref 8.5–10.3)
CHLORIDE SERPL-SCNC: 91 MMOL/L (ref 101–111)
CHLORIDE SERPL-SCNC: 92 MMOL/L (ref 101–111)
CHLORIDE SERPL-SCNC: 92 MMOL/L (ref 101–111)
CHLORIDE SERPL-SCNC: 93 MMOL/L (ref 101–111)
CO2 SERPL-SCNC: 19 MMOL/L (ref 21–32)
CO2 SERPL-SCNC: 21 MMOL/L (ref 21–32)
CREAT SERPLBLD-SCNC: 1 MG/DL (ref 0.6–1.2)
CREAT SERPLBLD-SCNC: 1.1 MG/DL (ref 0.6–1.2)
CREAT SERPLBLD-SCNC: 1.1 MG/DL (ref 0.6–1.2)
CREAT SERPLBLD-SCNC: 1.2 MG/DL (ref 0.6–1.2)
EOSINOPHIL # BLD AUTO: 0.6 10^3/UL (ref 0–0.7)
EOSINOPHIL NFR BLD AUTO: 3.3 %
ERYTHROCYTE [DISTWIDTH] IN BLOOD BY AUTOMATED COUNT: 12.9 % (ref 12–15)
GLUCOSE SERPL-MCNC: 113 MG/DL (ref 70–100)
GLUCOSE SERPL-MCNC: 117 MG/DL (ref 70–100)
GLUCOSE SERPL-MCNC: 130 MG/DL (ref 70–100)
GLUCOSE SERPL-MCNC: 157 MG/DL (ref 70–100)
HGB UR QL STRIP: 16.9 G/DL (ref 14–18)
LYMPHOCYTES # SPEC AUTO: 3.3 10^3/UL (ref 1.5–3.5)
LYMPHOCYTES NFR BLD AUTO: 18.9 %
MCH RBC QN AUTO: 29.8 PG (ref 27–31)
MCHC RBC AUTO-ENTMCNC: 35 G/DL (ref 32–36)
MCV RBC AUTO: 85 FL (ref 80–94)
MONOCYTES # BLD AUTO: 1.1 10^3/UL (ref 0–1)
MONOCYTES NFR BLD AUTO: 6.2 %
NEUTROPHILS # BLD AUTO: 12.2 10^3/UL (ref 1.5–6.6)
NEUTROPHILS # SNV AUTO: 17.6 X10^3/UL (ref 4.8–10.8)
NEUTROPHILS NFR BLD AUTO: 69.2 %
PDW BLD AUTO: 8.1 FL (ref 7.4–11.4)
PLATELET # BLD: 364 10^3/UL (ref 130–450)
RBC MAR: 5.68 10^6/UL (ref 4.7–6.1)

## 2021-02-13 RX ADMIN — POTASSIUM CHLORIDE SCH MLS/HR: 7.46 INJECTION, SOLUTION INTRAVENOUS at 13:52

## 2021-02-13 RX ADMIN — SPIRONOLACTONE SCH MG: 25 TABLET, FILM COATED ORAL at 09:00

## 2021-02-13 RX ADMIN — POTASSIUM CHLORIDE SCH MLS/HR: 7.46 INJECTION, SOLUTION INTRAVENOUS at 09:23

## 2021-02-13 RX ADMIN — POTASSIUM CHLORIDE SCH MLS/HR: 7.46 INJECTION, SOLUTION INTRAVENOUS at 00:03

## 2021-02-13 RX ADMIN — POTASSIUM CHLORIDE SCH MEQ: 1500 TABLET, EXTENDED RELEASE ORAL at 20:24

## 2021-02-13 RX ADMIN — POTASSIUM CHLORIDE SCH MEQ: 1500 TABLET, EXTENDED RELEASE ORAL at 17:40

## 2021-02-13 RX ADMIN — POTASSIUM CHLORIDE SCH MLS/HR: 7.46 INJECTION, SOLUTION INTRAVENOUS at 20:23

## 2021-02-13 RX ADMIN — POTASSIUM CHLORIDE SCH MLS/HR: 7.46 INJECTION, SOLUTION INTRAVENOUS at 23:30

## 2021-02-13 RX ADMIN — SODIUM CHLORIDE, PRESERVATIVE FREE SCH ML: 5 INJECTION INTRAVENOUS at 17:41

## 2021-02-13 RX ADMIN — FAMOTIDINE SCH MG: 10 INJECTION INTRAVENOUS at 09:00

## 2021-02-13 RX ADMIN — POTASSIUM CHLORIDE SCH MLS/HR: 7.46 INJECTION, SOLUTION INTRAVENOUS at 01:09

## 2021-02-13 RX ADMIN — SPIRONOLACTONE SCH MG: 25 TABLET, FILM COATED ORAL at 20:23

## 2021-02-13 RX ADMIN — FAMOTIDINE SCH MG: 10 INJECTION INTRAVENOUS at 20:24

## 2021-02-13 RX ADMIN — POTASSIUM CHLORIDE SCH MEQ: 1500 TABLET, EXTENDED RELEASE ORAL at 13:14

## 2021-02-13 RX ADMIN — POTASSIUM CHLORIDE SCH MLS/HR: 7.46 INJECTION, SOLUTION INTRAVENOUS at 21:26

## 2021-02-13 RX ADMIN — POTASSIUM CHLORIDE SCH MEQ: 1500 TABLET, EXTENDED RELEASE ORAL at 09:13

## 2021-02-13 RX ADMIN — SODIUM CHLORIDE, PRESERVATIVE FREE SCH: 5 INJECTION INTRAVENOUS at 00:14

## 2021-02-13 RX ADMIN — SODIUM CHLORIDE, PRESERVATIVE FREE SCH: 5 INJECTION INTRAVENOUS at 09:15

## 2021-02-13 RX ADMIN — POTASSIUM CHLORIDE SCH MLS/HR: 7.46 INJECTION, SOLUTION INTRAVENOUS at 11:57

## 2021-02-13 RX ADMIN — SODIUM CHLORIDE, PRESERVATIVE FREE SCH ML: 5 INJECTION INTRAVENOUS at 23:30

## 2021-02-13 RX ADMIN — POTASSIUM CHLORIDE SCH MLS/HR: 7.46 INJECTION, SOLUTION INTRAVENOUS at 22:26

## 2021-02-13 RX ADMIN — POTASSIUM CHLORIDE SCH MLS/HR: 7.46 INJECTION, SOLUTION INTRAVENOUS at 10:46

## 2021-02-13 NOTE — PROVIDER PROGRESS NOTE
Assessment/Plan





- Problem List


(1) Gitelman syndrome


Assessment/Plan: 





It was explained by the patient's mother earlier today that the patient's 

pharmacy recently changed the supplier for his potassium supplements.


It appears the patient's system was not able to digest/absorb the new potassium 

capsules.  This is inferred because the patient was passing whole potassium 

capsules in his stool.


This is likely the reason behind the patient's current low potassium level





Patient's nephrologist is Dr. Jj.


He recommended the patient be admitted to the hospital with the following 

recommendations.


Potassium chloride 80 mEq p.o. 4 times daily.


Supplemental IV potassium chloride replacement as well.


Be every 4 hours for at least 24 hours.


These have been ordered.


The patient has received a total of 90 mEq of potassium IV since admission.


Patient's potassium check at 1:30 PM today was 2.4.


We will continue to monitor and replace accordingly.








(2) Hypokalemia


Assessment/Plan: 


Secondary to Gitelman syndrome.


This is being replaced as outlined above








(3) Hyponatremia


Assessment/Plan: 


Expectation is that the patient's sodium level will improve as the potassium 

level is corrected.


Current sodium level is 130.  This is an improvement from the levels at 

admission which were 123.


We will continue to monitor and check.








(4) Leukocytosis


Assessment/Plan: 


Likely reactive


Patient's WBC at admission was 19.2.  It is currently 17.6 without any 

intervention.


Patient is afebrile. Urine analysis was unremarkable and he does not have any 

respiratory distress.


We will continue to monitor.








- Current Meds


Current Meds: 





                               Current Medications











Generic Name Dose Route Start Last Admin





  Trade Name Mansoorq  PRN Reason Stop Dose Admin


 


Diphenhydramine HCl  25 mg  02/12/21 22:29  02/13/21 00:04





  Diphenhydramine 25 Mg Capsule  PO   25 mg





  QPM PRN   Administration





  Insomnia  


 


Famotidine  20 mg  02/12/21 21:00  02/13/21 09:00





  Famotidine 20 Mg/2 Ml Vial  IVP   20 mg





  BID NEISHA   Administration


 


Sodium Chloride  1,000 mls @ 0 mls/hr  02/12/21 21:00  02/13/21 07:50





  Normal Saline 0.9%  IV   Infused





  .Q0M NEISHA   Infusion





  TKO  


 


Lisinopril  2.5 mg  02/13/21 09:00  02/13/21 09:00





  Lisinopril 5 Mg Tablet  PO   2.5 mg





  DAILY NEISHA   Administration


 


Potassium Chloride  80 meq  02/12/21 21:00  02/13/21 13:14





  Potassium Chloride 20 Meq Tablet  PO   80 meq





  QID NEISHA   Administration


 


Sodium Chloride  10 ml  02/13/21 01:00  02/13/21 09:15





  Sodium Chloride Flush 0.9% 10 Ml Syringe  IVP   Not Given





  0100,0900,1700 NEISHA  


 


Spironolactone  12.5 mg  02/12/21 21:00  02/13/21 09:00





  Spironolactone 25 Mg Tablet  PO   12.5 mg





  BID NEISHA   Administration














- Lab Result


Fish Bone Diagrams: 


                                 02/13/21 04:34





                                 02/13/21 13:27





Subjective





- Subjective


Patient Reports: Other (Patient was awake, alert and appeared comfortable at the

time of exam.  His main  complaint was tiredness this morning.  He did not get a

good night rest due to noise disturbance from next door.)





Objective


Vital Signs: 





                               Vital Signs - 24 hr











  02/12/21 02/12/21 02/12/21





  17:30 19:00 19:30


 


Temperature 36.7 C  


 


Heart Rate 98 90 80


 


Heart Rate [   





Brachial]   


 


Respiratory 18 18 28 H





Rate   


 


Blood Pressure 130/72 120/74 115/72


 


Blood Pressure   





[Right Brachial   





artery]   


 


O2 Saturation 95 99 97














  02/12/21 02/12/21 02/13/21





  21:13 22:05 00:00


 


Temperature 37.6 C 37.0 C 37.0 C


 


Heart Rate  91 


 


Heart Rate [ 91  92





Brachial]   


 


Respiratory 20 20 17





Rate   


 


Blood Pressure   


 


Blood Pressure 155/88 H  127/68





[Right Brachial   





artery]   


 


O2 Saturation 99 99 99














  02/13/21 02/13/21 02/13/21





  04:37 08:23 11:59


 


Temperature 37 C 37 C 37 C


 


Heart Rate   


 


Heart Rate [ 89 80 106 H





Brachial]   


 


Respiratory 18 16 18





Rate   


 


Blood Pressure   


 


Blood Pressure 127/68 132/72 H 132/70 H





[Right Brachial   





artery]   


 


O2 Saturation 97 99 99








                                     Oxygen











O2 Source                      Room air














I&O (Last 24 Hrs): 





                          Intake and Output Totals x24h











 02/11/21 02/12/21 02/13/21





 23:59 23:59 23:59


 


Intake Total  440 3185.417


 


Output Total   2375


 


Balance  440 810.417











General: Alert, Oriented x3, Cooperative, No acute distress


HEENT: PERRLA, EOMI


Neck: Supple, No JVD


Neuro: Alert, Non Focal, Oriented Times 3


Cardiovascular: Regular rate, Normal S1, Normal S2, No murmurs


Respiratory: Chest non-tender, No respiratory distress, Breath sounds nml


Abdomen: Normal bowel sounds, Soft


Extremities: No clubbing, No cyanosis


Skin: No rashes





- Results


Results: 





                               Laboratory Results











WBC  17.6 x10^3/uL (4.8-10.8)  H  02/13/21  04:34    


 


RBC  5.68 10^6/uL (4.70-6.10)   02/13/21  04:34    


 


Hgb  16.9 g/dL (14.0-18.0)   02/13/21  04:34    


 


Hct  48.3 % (42.0-52.0)   02/13/21  04:34    


 


MCV  85.0 fL (80.0-94.0)   02/13/21  04:34    


 


MCH  29.8 pg (27.0-31.0)   02/13/21  04:34    


 


MCHC  35.0 g/dL (32.0-36.0)   02/13/21  04:34    


 


RDW  12.9 % (12.0-15.0)   02/13/21  04:34    


 


Plt Count  364 10^3/uL (130-450)   02/13/21  04:34    


 


MPV  8.1 fL (7.4-11.4)   02/13/21  04:34    


 


Neut # (Auto)  12.2 10^3/uL (1.5-6.6)  H  02/13/21  04:34    


 


Lymph # (Auto)  3.3 10^3/uL (1.5-3.5)   02/13/21  04:34    


 


Mono # (Auto)  1.1 10^3/uL (0.0-1.0)  H  02/13/21  04:34    


 


Eos # (Auto)  0.6 10^3/uL (0.0-0.7)   02/13/21  04:34    


 


Baso # (Auto)  0.1 10^3/uL (0.0-0.1)   02/13/21  04:34    


 


Absolute Nucleated RBC  0.00 x10^3/uL  02/13/21  04:34    


 


Nucleated RBC %  0.0 /100WBC  02/13/21  04:34    


 


Sodium  130 mmol/L (135-145)  L  02/13/21  13:27    


 


Potassium  2.4 mmol/L (3.5-5.0)  L*  02/13/21  13:27    


 


Chloride  93 mmol/L (101-111)  L  02/13/21  13:27    


 


Carbon Dioxide  19 mmol/L (21-32)  L  02/13/21  13:27    


 


Anion Gap  18.0  (6-13)  H  02/13/21  13:27    


 


BUN  18 mg/dL (6-20)   02/13/21  13:27    


 


Creatinine  1.1 mg/dL (0.6-1.2)   02/13/21  13:27    


 


Estimated GFR (MDRD)  78  (>89)  L  02/13/21  13:27    


 


Glucose  117 mg/dL ()  H  02/13/21  13:27    


 


Calcium  9.1 mg/dL (8.5-10.3)   02/13/21  13:27    


 


Magnesium  2.0 mg/dL (1.7-2.8)   02/13/21  09:07    


 


Total Bilirubin  0.5 mg/dL (0.2-1.0)   02/12/21  19:05    


 


AST  27 IU/L (10-42)   02/12/21  19:05    


 


ALT  47 IU/L (10-60)   02/12/21  19:05    


 


Alkaline Phosphatase  117 IU/L ()   02/12/21  19:05    


 


Total Protein  7.4 g/dL (6.7-8.2)   02/12/21  19:05    


 


Albumin  3.9 g/dL (3.2-5.5)   02/12/21  19:05    


 


Globulin  3.5 g/dL (2.1-4.2)   02/12/21  19:05    


 


Albumin/Globulin Ratio  1.1  (1.0-2.2)   02/12/21  19:05    


 


Lipase  75 U/L (22-51)  H  02/12/21  19:05    


 


Urine Color  YELLOW   02/12/21  18:35    


 


Urine Clarity  CLEAR  (CLEAR)   02/12/21  18:35    


 


Urine pH  7.5 PH (5.0-7.5)   02/12/21  18:35    


 


Ur Specific Gravity  1.020  (1.002-1.030)   02/12/21  18:35    


 


Urine Protein  NEGATIVE mg/dL (NEGATIVE)   02/12/21  18:35    


 


Urine Glucose (UA)  NEGATIVE mg/dL (NEGATIVE)   02/12/21  18:35    


 


Urine Ketones  NEGATIVE mg/dL (NEGATIVE)   02/12/21  18:35    


 


Urine Occult Blood  NEGATIVE  (NEGATIVE)   02/12/21  18:35    


 


Urine Nitrite  NEGATIVE  (NEGATIVE)   02/12/21  18:35    


 


Urine Bilirubin  NEGATIVE  (NEGATIVE)   02/12/21  18:35    


 


Urine Urobilinogen  0.2 (NORMAL) E.U./dL (NORMAL)   02/12/21  18:35    


 


Ur Leukocyte Esterase  NEGATIVE  (NEGATIVE)   02/12/21  18:35    


 


Ur Microscopic Review  NOT INDICATED   02/12/21  18:35    


 


Urine Culture Comments  NOT INDICATED   02/12/21  18:35    


 


Nasal Adenovirus (PCR)  NOT DETECTED   02/12/21  19:05    


 


Nasal B. parapertussis DNA (PCR)  NOT DETECTED   02/12/21  19:05    


 


Nasal Coronavir 229E PCR  NOT DETECTED   02/12/21  19:05    


 


Nasal Coronavir HKU1 PCR  NOT DETECTED   02/12/21  19:05    


 


Nasal Coronavir NL63 PCR  NOT DETECTED   02/12/21  19:05    


 


Nasal Coronavir OC43 PCR  NOT DETECTED   02/12/21  19:05    


 


Nasal Enterovir/Rhinovir PCR  NOT DETECTED   02/12/21  19:05    


 


Nasal Influenza B PCR  NOT DETECTED   02/12/21  19:05    


 


Nasal Influenza A PCR  NOT DETECTED   02/12/21  19:05    


 


Nasal Parainfluen 1 PCR  NOT DETECTED   02/12/21  19:05    


 


Nasal Parainfluen 2 PCR  NOT DETECTED   02/12/21  19:05    


 


Nasal Parainfluen 3 PCR  NOT DETECTED   02/12/21  19:05    


 


Nasal Parainfluen 4 PCR  NOT DETECTED   02/12/21  19:05    


 


Nasal RSV (PCR)  NOT DETECTED   02/12/21  19:05    


 


Nasal B.pertussis DNA PCR  NOT DETECTED   02/12/21  19:05    


 


Nasal C.pneumoniae (PCR)  NOT DETECTED   02/12/21  19:05    


 


Augusto Human Metapneumo PCR  NOT DETECTED   02/12/21  19:05    


 


Nasal M.pneumoniae (PCR)  NOT DETECTED   02/12/21  19:05    


 


Nasal SARS-CoV-2 (PCR)  NOT DETECTED   02/12/21  19:05    














- Procedures


Procedures: 





Procedures





CENTRAL VENOUS CATHETER PLACEMENT WITH GUIDANCE (01/02/15)


CONTINUOUS INVASIVE MECHANICAL VENTILATION <96 CONSEC HRS (01/02/15)


INSERT ENDOTRACHEAL TUBE (01/02/15)


INSERT INFUSION DEV IN R INT JUGULAR VEIN, PERC (02/04/16)


VENOUS CATHETERIZATION NEC (12/19/13)











ABX Reporting


Has patient been on IV antibiotics over the past 48 hours?: No

## 2021-02-14 LAB
ANION GAP SERPL CALCULATED.4IONS-SCNC: 13 MMOL/L (ref 6–13)
ANION GAP SERPL CALCULATED.4IONS-SCNC: 15 MMOL/L (ref 6–13)
BASOPHILS NFR BLD AUTO: 0.1 10^3/UL (ref 0–0.1)
BASOPHILS NFR BLD AUTO: 0.5 %
BUN SERPL-MCNC: 20 MG/DL (ref 6–20)
BUN SERPL-MCNC: 22 MG/DL (ref 6–20)
CALCIUM UR-MCNC: 8.6 MG/DL (ref 8.5–10.3)
CALCIUM UR-MCNC: 9.4 MG/DL (ref 8.5–10.3)
CHLORIDE SERPL-SCNC: 92 MMOL/L (ref 101–111)
CHLORIDE SERPL-SCNC: 96 MMOL/L (ref 101–111)
CO2 SERPL-SCNC: 19 MMOL/L (ref 21–32)
CO2 SERPL-SCNC: 23 MMOL/L (ref 21–32)
CREAT SERPLBLD-SCNC: 1 MG/DL (ref 0.6–1.2)
CREAT SERPLBLD-SCNC: 1.1 MG/DL (ref 0.6–1.2)
EOSINOPHIL # BLD AUTO: 0.5 10^3/UL (ref 0–0.7)
EOSINOPHIL NFR BLD AUTO: 2.4 %
ERYTHROCYTE [DISTWIDTH] IN BLOOD BY AUTOMATED COUNT: 12.7 % (ref 12–15)
GLUCOSE SERPL-MCNC: 116 MG/DL (ref 70–100)
GLUCOSE SERPL-MCNC: 118 MG/DL (ref 70–100)
HGB UR QL STRIP: 17.8 G/DL (ref 14–18)
LYMPHOCYTES # SPEC AUTO: 3.4 10^3/UL (ref 1.5–3.5)
LYMPHOCYTES NFR BLD AUTO: 15.6 %
MCH RBC QN AUTO: 29.9 PG (ref 27–31)
MCHC RBC AUTO-ENTMCNC: 36 G/DL (ref 32–36)
MCV RBC AUTO: 83 FL (ref 80–94)
MONOCYTES # BLD AUTO: 1.5 10^3/UL (ref 0–1)
MONOCYTES NFR BLD AUTO: 6.7 %
NEUTROPHILS # BLD AUTO: 16.2 10^3/UL (ref 1.5–6.6)
NEUTROPHILS # SNV AUTO: 22 X10^3/UL (ref 4.8–10.8)
NEUTROPHILS NFR BLD AUTO: 73.5 %
PDW BLD AUTO: 7.8 FL (ref 7.4–11.4)
PLAT MORPH BLD: (no result)
PLATELET # BLD: 406 10^3/UL (ref 130–450)
PLATELET BLD QL SMEAR: (no result)
RBC MAR: 5.95 10^6/UL (ref 4.7–6.1)
RBC MORPH BLD: (no result)

## 2021-02-14 RX ADMIN — POTASSIUM CHLORIDE SCH MLS/HR: 7.46 INJECTION, SOLUTION INTRAVENOUS at 09:18

## 2021-02-14 RX ADMIN — POTASSIUM CHLORIDE SCH MLS/HR: 7.46 INJECTION, SOLUTION INTRAVENOUS at 11:16

## 2021-02-14 RX ADMIN — SODIUM CHLORIDE SCH MLS/HR: 9 INJECTION, SOLUTION INTRAVENOUS at 06:06

## 2021-02-14 RX ADMIN — SODIUM CHLORIDE, PRESERVATIVE FREE SCH ML: 5 INJECTION INTRAVENOUS at 17:11

## 2021-02-14 RX ADMIN — POTASSIUM BICARBONATE SCH MEQ: 25 TABLET, EFFERVESCENT ORAL at 17:11

## 2021-02-14 RX ADMIN — POTASSIUM BICARBONATE SCH MEQ: 25 TABLET, EFFERVESCENT ORAL at 21:23

## 2021-02-14 RX ADMIN — POTASSIUM CHLORIDE SCH MLS/HR: 7.46 INJECTION, SOLUTION INTRAVENOUS at 08:09

## 2021-02-14 RX ADMIN — POTASSIUM CHLORIDE SCH MLS/HR: 7.46 INJECTION, SOLUTION INTRAVENOUS at 19:19

## 2021-02-14 RX ADMIN — POTASSIUM CHLORIDE SCH MLS/HR: 7.46 INJECTION, SOLUTION INTRAVENOUS at 21:56

## 2021-02-14 RX ADMIN — POTASSIUM CHLORIDE SCH MLS/HR: 7.46 INJECTION, SOLUTION INTRAVENOUS at 20:59

## 2021-02-14 RX ADMIN — POTASSIUM BICARBONATE SCH MEQ: 25 TABLET, EFFERVESCENT ORAL at 13:10

## 2021-02-14 RX ADMIN — FAMOTIDINE SCH MG: 10 INJECTION INTRAVENOUS at 08:40

## 2021-02-14 RX ADMIN — POTASSIUM CHLORIDE SCH MLS/HR: 7.46 INJECTION, SOLUTION INTRAVENOUS at 23:31

## 2021-02-14 RX ADMIN — FAMOTIDINE SCH MG: 10 INJECTION INTRAVENOUS at 21:20

## 2021-02-14 RX ADMIN — POTASSIUM CHLORIDE SCH MLS/HR: 7.46 INJECTION, SOLUTION INTRAVENOUS at 10:19

## 2021-02-14 RX ADMIN — SODIUM CHLORIDE, PRESERVATIVE FREE SCH: 5 INJECTION INTRAVENOUS at 08:47

## 2021-02-14 RX ADMIN — SPIRONOLACTONE SCH MG: 25 TABLET, FILM COATED ORAL at 21:20

## 2021-02-14 RX ADMIN — POTASSIUM CHLORIDE SCH MEQ: 1500 TABLET, EXTENDED RELEASE ORAL at 08:36

## 2021-02-14 RX ADMIN — SPIRONOLACTONE SCH MG: 25 TABLET, FILM COATED ORAL at 08:38

## 2021-02-14 NOTE — PROVIDER PROGRESS NOTE
Assessment/Plan





- Problem List


(1) Gitelman syndrome


Assessment/Plan: 


02/14/21


Patient's potassium level this morning was 2.1


He was switched from potassium chloride pills to potassium bicarbonate 

effervescent tablets 100mEq qid


He also received 40 mEq of potassium chloride IV and Magnesium Sulphate 2g IV


I spoke to the patient's nephrologist Dr. Jj today who recommended we 

continue current treatment


He is on lisinopril 2.5 mg p.o. daily and spironolactone 12.5mg po bid





02/13/21


It was explained by the patient's mother earlier today that the patient's 

pharmacy recently changed the supplier for his potassium supplements.


It appears the patient's system was not able to digest/absorb the new potassium 

capsules.  This is inferred because the patient was passing whole potassium 

capsules in his stool.


This is likely the reason behind the patient's current low potassium level





Patient's nephrologist is Dr. Jj.


He recommended the patient be admitted to the hospital with the following rec

ommendations.


Potassium chloride 80 mEq p.o. 4 times daily.


Supplemental IV potassium chloride replacement as well.


Be every 4 hours for at least 24 hours.


These have been ordered.


The patient has received a total of 90 mEq of potassium IV since admission.


Patient's potassium check at 1:30 PM today was 2.4.


We will continue to monitor and replace accordingly.











(2) Hypokalemia


Assessment/Plan: 


Secondary to Gitelman syndrome.


This is being replaced as outlined above








(3) Hyponatremia


Assessment/Plan: 


Expectation is that the patient's sodium level will improve as the potassium 

level is corrected.


Current sodium level is 128.  This is an improvement from the levels at 

admission which were 123.


We will continue to monitor and check.











(4) Leukocytosis


Assessment/Plan: 


Likely reactive


Patient is afebrile. Urine analysis was unremarkable and he does not have any 

respiratory distress.


We will continue to monitor.








- Current Meds


Current Meds: 





                               Current Medications











Generic Name Dose Route Start Last Admin





  Trade Name Freq  PRN Reason Stop Dose Admin


 


Diphenhydramine HCl  25 mg  02/12/21 22:29  02/13/21 00:04





  Diphenhydramine 25 Mg Capsule  PO   25 mg





  QPM PRN   Administration





  Insomnia  


 


Famotidine  20 mg  02/12/21 21:00  02/13/21 20:24





  Famotidine 20 Mg/2 Ml Vial  IVP   20 mg





  BID NEISHA   Administration


 


Sodium Chloride  1,000 mls @ 0 mls/hr  02/12/21 21:00  02/14/21 06:06





  Normal Saline 0.9%  IV   30 mls/hr





  .Q0M NEISHA   Administration





  TKO  


 


Lisinopril  2.5 mg  02/13/21 09:00  02/13/21 09:00





  Lisinopril 5 Mg Tablet  PO   2.5 mg





  DAILY NEISHA   Administration


 


Potassium Chloride  80 meq  02/12/21 21:00  02/13/21 20:24





  Potassium Chloride 20 Meq Tablet  PO   80 meq





  QID NEISHA   Administration


 


Sodium Chloride  10 ml  02/13/21 01:00  02/13/21 23:30





  Sodium Chloride Flush 0.9% 10 Ml Syringe  IVP   10 ml





  0100,0900,1700 NEISHA   Administration


 


Spironolactone  12.5 mg  02/12/21 21:00  02/13/21 20:23





  Spironolactone 25 Mg Tablet  PO   12.5 mg





  BID NEISHA   Administration


 


Zolpidem Tartrate  5 mg  02/13/21 20:38  02/13/21 21:26





  Zolpidem 5 Mg Tablet  PO   5 mg





  QPM PRN   Administration





  Insomnia  














- Lab Result


Fish Bone Diagrams: 


                                 02/13/21 04:34





                                 02/14/21 05:12





- Additional Planning


My Orders: 





My Active Orders





02/14/21 08:00


Potassium Chloride/Water 10 mEq/100 mL q1h (Enter # of bags) Potassium Chlor 10 

Meq/100 ml [Potassium Chloride] 10 meq in 100 ml IV Q1H 





02/15/21 05:00


BMP - BASIC METABOLIC PANEL [CHEM] DAILYLAB 














Subjective





- Subjective


Patient Reports: Other (Seen comfortably in bed.  Denies any complaints.)





Objective


Vital Signs: 





                               Vital Signs - 24 hr











  02/13/21 02/13/21 02/13/21





  08:23 11:59 16:45


 


Temperature 37 C 37 C 37.2 C


 


Heart Rate [ 80 106 H 89





Brachial]   


 


Respiratory 16 18 20





Rate   


 


Blood Pressure 132/72 H 132/70 H 126/67





[Right Brachial   





artery]   


 


O2 Saturation 99 99 99














  02/13/21 02/13/21 02/14/21





  21:00 23:35 05:00


 


Temperature 36.7 C 37.0 C 36.7 C


 


Heart Rate [ 83 88 99





Brachial]   


 


Respiratory 20 16 18





Rate   


 


Blood Pressure 126/76 115/62 121/68





[Right Brachial   





artery]   


 


O2 Saturation 97 99 100








                                     Oxygen











O2 Source                      Room air














I&O (Last 24 Hrs): 





                          Intake and Output Totals x24h











 02/12/21 02/13/21 02/14/21





 23:59 23:59 23:59


 


Intake Total 440 4122.417 340


 


Output Total  3075 1000


 


Balance 440 1047.417 -660











General: Alert, Oriented x3, Cooperative, No acute distress


HEENT: PERRLA, EOMI


Neck: Supple, No JVD


Neuro: Alert, Non Focal, Oriented Times 3


Cardiovascular: Regular rate, Normal S1, Normal S2


Respiratory: Chest non-tender, No respiratory distress, Breath sounds nml


Abdomen: Normal bowel sounds, Soft


Extremities: No clubbing, No cyanosis, No edema


Skin: No rashes





- Results


Results: 





                               Laboratory Results











WBC  17.6 x10^3/uL (4.8-10.8)  H  02/13/21  04:34    


 


RBC  5.68 10^6/uL (4.70-6.10)   02/13/21  04:34    


 


Hgb  16.9 g/dL (14.0-18.0)   02/13/21  04:34    


 


Hct  48.3 % (42.0-52.0)   02/13/21  04:34    


 


MCV  85.0 fL (80.0-94.0)   02/13/21  04:34    


 


MCH  29.8 pg (27.0-31.0)   02/13/21  04:34    


 


MCHC  35.0 g/dL (32.0-36.0)   02/13/21  04:34    


 


RDW  12.9 % (12.0-15.0)   02/13/21  04:34    


 


Plt Count  364 10^3/uL (130-450)   02/13/21  04:34    


 


MPV  8.1 fL (7.4-11.4)   02/13/21  04:34    


 


Neut # (Auto)  12.2 10^3/uL (1.5-6.6)  H  02/13/21  04:34    


 


Lymph # (Auto)  3.3 10^3/uL (1.5-3.5)   02/13/21  04:34    


 


Mono # (Auto)  1.1 10^3/uL (0.0-1.0)  H  02/13/21  04:34    


 


Eos # (Auto)  0.6 10^3/uL (0.0-0.7)   02/13/21  04:34    


 


Baso # (Auto)  0.1 10^3/uL (0.0-0.1)   02/13/21  04:34    


 


Absolute Nucleated RBC  0.00 x10^3/uL  02/13/21  04:34    


 


Nucleated RBC %  0.0 /100WBC  02/13/21  04:34    


 


Sodium  128 mmol/L (135-145)  L  02/14/21  05:12    


 


Potassium  2.1 mmol/L (3.5-5.0)  L*  02/14/21  05:12    


 


Chloride  96 mmol/L (101-111)  L  02/14/21  05:12    


 


Carbon Dioxide  19 mmol/L (21-32)  L  02/14/21  05:12    


 


Anion Gap  13.0  (6-13)   02/14/21  05:12    


 


BUN  20 mg/dL (6-20)   02/14/21  05:12    


 


Creatinine  1.1 mg/dL (0.6-1.2)   02/14/21  05:12    


 


Estimated GFR (MDRD)  78  (>89)  L  02/14/21  05:12    


 


Glucose  116 mg/dL ()  H  02/14/21  05:12    


 


Calcium  8.6 mg/dL (8.5-10.3)   02/14/21  05:12    


 


Magnesium  2.0 mg/dL (1.7-2.8)   02/13/21  09:07    


 


Total Bilirubin  0.5 mg/dL (0.2-1.0)   02/12/21  19:05    


 


AST  27 IU/L (10-42)   02/12/21  19:05    


 


ALT  47 IU/L (10-60)   02/12/21  19:05    


 


Alkaline Phosphatase  117 IU/L ()   02/12/21  19:05    


 


Total Protein  7.4 g/dL (6.7-8.2)   02/12/21  19:05    


 


Albumin  3.9 g/dL (3.2-5.5)   02/12/21  19:05    


 


Globulin  3.5 g/dL (2.1-4.2)   02/12/21  19:05    


 


Albumin/Globulin Ratio  1.1  (1.0-2.2)   02/12/21  19:05    


 


Lipase  75 U/L (22-51)  H  02/12/21  19:05    


 


Urine Color  YELLOW   02/12/21  18:35    


 


Urine Clarity  CLEAR  (CLEAR)   02/12/21  18:35    


 


Urine pH  7.5 PH (5.0-7.5)   02/12/21  18:35    


 


Ur Specific Gravity  1.020  (1.002-1.030)   02/12/21  18:35    


 


Urine Protein  NEGATIVE mg/dL (NEGATIVE)   02/12/21  18:35    


 


Urine Glucose (UA)  NEGATIVE mg/dL (NEGATIVE)   02/12/21  18:35    


 


Urine Ketones  NEGATIVE mg/dL (NEGATIVE)   02/12/21  18:35    


 


Urine Occult Blood  NEGATIVE  (NEGATIVE)   02/12/21  18:35    


 


Urine Nitrite  NEGATIVE  (NEGATIVE)   02/12/21  18:35    


 


Urine Bilirubin  NEGATIVE  (NEGATIVE)   02/12/21  18:35    


 


Urine Urobilinogen  0.2 (NORMAL) E.U./dL (NORMAL)   02/12/21  18:35    


 


Ur Leukocyte Esterase  NEGATIVE  (NEGATIVE)   02/12/21  18:35    


 


Ur Microscopic Review  NOT INDICATED   02/12/21  18:35    


 


Urine Culture Comments  NOT INDICATED   02/12/21  18:35    


 


Nasal Adenovirus (PCR)  NOT DETECTED   02/12/21  19:05    


 


Nasal B. parapertussis DNA (PCR)  NOT DETECTED   02/12/21  19:05    


 


Nasal Coronavir 229E PCR  NOT DETECTED   02/12/21  19:05    


 


Nasal Coronavir HKU1 PCR  NOT DETECTED   02/12/21  19:05    


 


Nasal Coronavir NL63 PCR  NOT DETECTED   02/12/21  19:05    


 


Nasal Coronavir OC43 PCR  NOT DETECTED   02/12/21  19:05    


 


Nasal Enterovir/Rhinovir PCR  NOT DETECTED   02/12/21  19:05    


 


Nasal Influenza B PCR  NOT DETECTED   02/12/21  19:05    


 


Nasal Influenza A PCR  NOT DETECTED   02/12/21  19:05    


 


Nasal Parainfluen 1 PCR  NOT DETECTED   02/12/21  19:05    


 


Nasal Parainfluen 2 PCR  NOT DETECTED   02/12/21  19:05    


 


Nasal Parainfluen 3 PCR  NOT DETECTED   02/12/21  19:05    


 


Nasal Parainfluen 4 PCR  NOT DETECTED   02/12/21  19:05    


 


Nasal RSV (PCR)  NOT DETECTED   02/12/21  19:05    


 


Nasal B.pertussis DNA PCR  NOT DETECTED   02/12/21  19:05    


 


Nasal C.pneumoniae (PCR)  NOT DETECTED   02/12/21  19:05    


 


Augusto Human Metapneumo PCR  NOT DETECTED   02/12/21  19:05    


 


Nasal M.pneumoniae (PCR)  NOT DETECTED   02/12/21  19:05    


 


Nasal SARS-CoV-2 (PCR)  NOT DETECTED   02/12/21  19:05    














- Procedures


Procedures: 





Procedures





CENTRAL VENOUS CATHETER PLACEMENT WITH GUIDANCE (01/02/15)


CONTINUOUS INVASIVE MECHANICAL VENTILATION <96 CONSEC HRS (01/02/15)


INSERT ENDOTRACHEAL TUBE (01/02/15)


INSERT INFUSION DEV IN R INT JUGULAR VEIN, PERC (02/04/16)


VENOUS CATHETERIZATION NEC (12/19/13)











ABX Reporting


Has patient been on IV antibiotics over the past 48 hours?: No

## 2021-02-15 VITALS — DIASTOLIC BLOOD PRESSURE: 74 MMHG | SYSTOLIC BLOOD PRESSURE: 134 MMHG

## 2021-02-15 LAB
ANION GAP SERPL CALCULATED.4IONS-SCNC: 13 MMOL/L (ref 6–13)
BASOPHILS NFR BLD AUTO: 0.1 10^3/UL (ref 0–0.1)
BASOPHILS NFR BLD AUTO: 0.5 %
BUN SERPL-MCNC: 24 MG/DL (ref 6–20)
CALCIUM UR-MCNC: 8.9 MG/DL (ref 8.5–10.3)
CHLORIDE SERPL-SCNC: 93 MMOL/L (ref 101–111)
CO2 SERPL-SCNC: 23 MMOL/L (ref 21–32)
CREAT SERPLBLD-SCNC: 1.1 MG/DL (ref 0.6–1.2)
EOSINOPHIL # BLD AUTO: 0.5 10^3/UL (ref 0–0.7)
EOSINOPHIL NFR BLD AUTO: 3 %
ERYTHROCYTE [DISTWIDTH] IN BLOOD BY AUTOMATED COUNT: 12.8 % (ref 12–15)
GLUCOSE SERPL-MCNC: 112 MG/DL (ref 70–100)
HGB UR QL STRIP: 17.7 G/DL (ref 14–18)
LYMPHOCYTES # SPEC AUTO: 3.4 10^3/UL (ref 1.5–3.5)
LYMPHOCYTES NFR BLD AUTO: 19.2 %
MCH RBC QN AUTO: 30.1 PG (ref 27–31)
MCHC RBC AUTO-ENTMCNC: 35.8 G/DL (ref 32–36)
MCV RBC AUTO: 84 FL (ref 80–94)
MONOCYTES # BLD AUTO: 1.1 10^3/UL (ref 0–1)
MONOCYTES NFR BLD AUTO: 6.2 %
NEUTROPHILS # BLD AUTO: 12.2 10^3/UL (ref 1.5–6.6)
NEUTROPHILS # SNV AUTO: 17.5 X10^3/UL (ref 4.8–10.8)
NEUTROPHILS NFR BLD AUTO: 69.6 %
PDW BLD AUTO: 8 FL (ref 7.4–11.4)
PLATELET # BLD: 370 10^3/UL (ref 130–450)
RBC MAR: 5.89 10^6/UL (ref 4.7–6.1)

## 2021-02-15 RX ADMIN — FAMOTIDINE SCH MG: 10 INJECTION INTRAVENOUS at 08:42

## 2021-02-15 RX ADMIN — POTASSIUM BICARBONATE SCH MEQ: 25 TABLET, EFFERVESCENT ORAL at 12:57

## 2021-02-15 RX ADMIN — SODIUM CHLORIDE, PRESERVATIVE FREE SCH ML: 5 INJECTION INTRAVENOUS at 01:46

## 2021-02-15 RX ADMIN — SODIUM CHLORIDE, PRESERVATIVE FREE SCH ML: 5 INJECTION INTRAVENOUS at 08:42

## 2021-02-15 RX ADMIN — POTASSIUM CHLORIDE SCH MLS/HR: 7.46 INJECTION, SOLUTION INTRAVENOUS at 12:22

## 2021-02-15 RX ADMIN — POTASSIUM CHLORIDE SCH MLS/HR: 7.46 INJECTION, SOLUTION INTRAVENOUS at 07:49

## 2021-02-15 RX ADMIN — POTASSIUM CHLORIDE SCH MLS/HR: 7.46 INJECTION, SOLUTION INTRAVENOUS at 10:38

## 2021-02-15 RX ADMIN — POTASSIUM BICARBONATE SCH MEQ: 25 TABLET, EFFERVESCENT ORAL at 08:42

## 2021-02-15 RX ADMIN — POTASSIUM CHLORIDE SCH MLS/HR: 7.46 INJECTION, SOLUTION INTRAVENOUS at 09:15

## 2021-02-15 NOTE — DISCHARGE SUMMARY
Discharge Summary


Admit Date: 02/12/21


Discharge Date: 02/15/21


Discharging Provider: Isaias Leon


Primary Care Provider: Manuel Jj


Code Status: Attempt Resuscitation


Condition at Discharge: Stable


Discharge Disposition: 07 Against Medical Advice





- DIAGNOSES


Admission Diagnoses: 





Gitelman syndrome


Hypokalemia


Hyponatremia


History of past noncompliance


Leukocytosis


Discharge Diagnoses with Status of Each Condition: 





Gitelman syndrome: Chronic.  Last potassium prior to discharge was 1.8.  Patient

left AGAINST MEDICAL ADVICE


Hypokalemia: Last potassium prior to discharge was 1.8.  Patient left AGAINST 

MEDICAL ADVICE


Hyponatremia: Patient left AGAINST MEDICAL ADVICE


History of past noncompliance


Leukocytosis: Resolved





- HPI


History of Present Illness: 





Per HPI:


Patient is a 30-year-old male Who has a history of Gitelman syndrome, severe 

hypokalemia recurrent and requiring very high doses of supplemental potassium 

daily.  He is to follow-up with Dr. Villareal of nephrology but now sees Dr. Jj.  In the past there have been multiple episodes of noncompliance when 

he stops taking his medication was states that he has run out of them and then 

presents here with severe hypokalemia with symptoms of fatigue.  He denies any 

other side effects from low potassium such as palpitations or syncope.  He 

presents with having not taken his medication because they have run out of doses

and doses were being changed around he states.  He has outpatient labs drawn and

Dr. Jj called the ED physician stating that the patient was coming in 

because of low potassium he has no complaints currently.





The patient was admitted and started on IV potassium chloride.  During the durat

ion of his hospital stay he received a total of 160 mEq of potassium chloride 

IV.


He was also on 80 mEq of potassium chloride p.o. 4 times daily.


Despite this the patient's potassium never improved beyond 2.4.





On further inquiry the patient explains that he used to be on potassium 

bicarbonate FF resents.  His pharmacy ran out of their favor sent and he was 

placed on potassium chloride pills.  However he had problems absorbing this as 

he would pass whole pills in his stool.


Upon finding this we will switch the patient to potassium bicarbonate 

effervescent pills this well 100 mEq 4 times daily.


The patient also received supplemental magnesium sulfate.


Upon research literature supported placing the patient on increased doses of 

spironolactone.  He was placed on spironolactone 25 mg p.o. twice daily on the 

day before discharge.  This was an increase from his base dose of 12.5 twice 

daily.


He was also placed on indomethacin to help with potassium absorption.


On 2/15/2021 the patient suddenly decided he wanted to leave the hospital around

2 PM.


I went to see the patient and pointed out that with such low levels of potassium

he could go into arrhythmias he could pass out and it could potentially lead to 

his demise.  He expressed understanding but was very insistent on leaving the 

hospital.  He said this is happened before and he would just deal with it.  

Consequently he signed the documentation all forms for AMA and left.  He refused

to have a final potassium check before leaving.





- ALLERGIES


Allergies/Adverse Reactions: 


                                    Allergies











Allergy/AdvReac Type Severity Reaction Status Date / Time


 


No Known Drug Allergies Allergy   Verified 10/16/19 15:07














- MEDICATIONS


Home Medications: 


                                Ambulatory Orders











 Medication  Instructions  Recorded  Confirmed


 


Spironolactone 12.5 mg PO BID 10/16/19 02/12/21


 


Lisinopril [Zestril] 2.5 mg PO DAILY 02/12/21 02/12/21


 


Ergocalciferol (Vitamin D2) 1.25 mg PO Q7D 02/13/21 02/13/21





[Vitamin D2]   


 


Potassium Chloride 100 meq PO QID 02/13/21 02/13/21














- PHYSICAL EXAM AT DISCHARGE


General Appearance: positive: No acute distress, Alert


Eyes Bilateral: positive: PERRL, EOMI


ENT: positive: No signs of dehydration


Neck: positive: No JVD, Trachea midline


Respiratory: positive: Chest non-tender, No respiratory distress, Breath sounds 

nml.  negative: Wheezes, Rales, Rhonchi


Cardiovascular: positive: Regular rate & rhythm, No murmur


Abdomen: positive: Non-tender, No organomegaly, Nml bowel sounds, No distention.

 negative: Guarding, Rebound


Back: positive: Nml inspection


Skin: positive: Color nml, No rash, Warm, Dry


Extremities: positive: Non-tender, Full ROM, Nml appearance


Neurologic/Psychiatric: positive: Oriented x3, CN's nml (2-12), Mood/affect nml





- LABS


Result Diagrams: 


                                 02/15/21 05:36





                                 02/15/21 05:36





- FOLLOW UP


Follow Up: 





Dr. Jj of nephrology as needed





- TIME SPENT


Time Spent in Discharge (Minutes): 25

## 2021-02-15 NOTE — DISCHARGE PLAN
Discharge Plan


Problem Reviewed?: Yes


Disposition: 07 Against Medical Advice


Diet: Regular


Activity Restrictions: Activity as Tolerated


No Smoking: If you smoke, Please STOP!  Call [phone] for help.


Follow-up with: 


Manuel Jj MD [Primary Care Provider] -

## 2021-02-15 NOTE — PROVIDER PROGRESS NOTE
Assessment/Plan





- Current Meds


Current Meds: 





                               Current Medications











Generic Name Dose Route Start Last Admin





  Trade Name Mansoorq  PRN Reason Stop Dose Admin


 


Diphenhydramine HCl  25 mg  02/12/21 22:29  02/15/21 01:41





  Diphenhydramine 25 Mg Capsule  PO   25 mg





  QPM PRN   Administration





  Insomnia  


 


Famotidine  20 mg  02/12/21 21:00  02/14/21 21:20





  Famotidine 20 Mg/2 Ml Vial  IVP   20 mg





  BID NEISHA   Administration


 


Potassium Chloride  10 meq in 100 mls @ 100 mls/hr  02/15/21 07:00  02/15/21 

07:49





  Potassium Chloride  IV  02/15/21 10:59  100 mls/hr





  Q1H NEISHA   Administration


 


Indomethacin  25 mg  02/15/21 08:00  02/15/21 07:49





  Indomethacin 25 Mg Capsule  PO   25 mg





  BIDWM NEISHA   Administration


 


Lisinopril  2.5 mg  02/13/21 09:00  02/14/21 08:37





  Lisinopril 5 Mg Tablet  PO   2.5 mg





  DAILY NEISHA   Administration


 


Potassium Bicarbonate  100 meq  02/14/21 13:00  02/14/21 21:23





  Potassium Bicarb 25 Meq Tablet  PO   100 meq





  QID NEISHA   Administration


 


Sodium Chloride  10 ml  02/13/21 01:00  02/15/21 01:46





  Sodium Chloride Flush 0.9% 10 Ml Syringe  IVP   10 ml





  0100,0900,1700 NEISHA   Administration


 


Zolpidem Tartrate  5 mg  02/13/21 20:38  02/13/21 21:26





  Zolpidem 5 Mg Tablet  PO   5 mg





  QPM PRN   Administration





  Insomnia  














- Lab Result


Fish Bone Diagrams: 


                                 02/15/21 05:36





                                 02/15/21 05:36





- Additional Planning


My Orders: 





My Active Orders





02/14/21 13:00


Potassium Bicarbonate [K-Effervescent]   100 meq PO QID 





02/16/21 05:00


CBC - COMP BLD CT W/AUTO DIFF [HEME] DAILYLAB 














Objective


Vital Signs: 





                               Vital Signs - 24 hr











  02/14/21 02/14/21 02/14/21





  13:00 16:49 20:59


 


Temperature 36.9 C 37.3 C 36.8 C


 


Heart Rate [ 106 H 90 93





Brachial]   


 


Respiratory 16 18 18





Rate   


 


Blood Pressure 127/75 131/69 H 131/64 H





[Right Brachial   





artery]   


 


O2 Saturation 100 98 97














  02/14/21 02/15/21





  23:50 05:40


 


Temperature 36.8 C 36.7 C


 


Heart Rate [ 93 92





Brachial]  


 


Respiratory 18 20





Rate  


 


Blood Pressure 131/68 H 136/77 H





[Right Brachial  





artery]  


 


O2 Saturation 97 97








                                     Oxygen











O2 Source                      Room air














I&O (Last 24 Hrs): 





                          Intake and Output Totals x24h











 02/13/21 02/14/21 02/15/21





 23:59 23:59 23:59


 


Intake Total 4122.417 2832 240


 


Output Total 3075 2500 


 


Balance 1047.417 332 240














- Results


Results: 





                               Laboratory Results











WBC  17.5 x10^3/uL (4.8-10.8)  H  02/15/21  05:36    


 


RBC  5.89 10^6/uL (4.70-6.10)   02/15/21  05:36    


 


Hgb  17.7 g/dL (14.0-18.0)   02/15/21  05:36    


 


Hct  49.5 % (42.0-52.0)   02/15/21  05:36    


 


MCV  84.0 fL (80.0-94.0)   02/15/21  05:36    


 


MCH  30.1 pg (27.0-31.0)   02/15/21  05:36    


 


MCHC  35.8 g/dL (32.0-36.0)   02/15/21  05:36    


 


RDW  12.8 % (12.0-15.0)   02/15/21  05:36    


 


Plt Count  370 10^3/uL (130-450)   02/15/21  05:36    


 


MPV  8.0 fL (7.4-11.4)   02/15/21  05:36    


 


Neut # (Auto)  12.2 10^3/uL (1.5-6.6)  H  02/15/21  05:36    


 


Lymph # (Auto)  3.4 10^3/uL (1.5-3.5)   02/15/21  05:36    


 


Mono # (Auto)  1.1 10^3/uL (0.0-1.0)  H  02/15/21  05:36    


 


Eos # (Auto)  0.5 10^3/uL (0.0-0.7)   02/15/21  05:36    


 


Baso # (Auto)  0.1 10^3/uL (0.0-0.1)   02/15/21  05:36    


 


Absolute Nucleated RBC  0.00 x10^3/uL  02/15/21  05:36    


 


Nucleated RBC %  0.0 /100WBC  02/15/21  05:36    


 


Manual Slide Review  Indicated   02/14/21  16:25    


 


WBC Morphology  NORMAL APPEARANCE  (NORMAL)   02/14/21  16:25    


 


Platelet Estimate  NORMAL (130-450,000)  (NORMAL)   02/14/21  16:25    


 


Platelet Morphology  NORMAL APPEARANCE  (NORMAL)   02/14/21  16:25    


 


RBC Morph Micro Appear  NORMAL APPEARANCE  (NORMAL)   02/14/21  16:25    


 


Sodium  129 mmol/L (135-145)  L  02/15/21  05:36    


 


Potassium  1.8 mmol/L (3.5-5.0)  L*  02/15/21  05:36    


 


Chloride  93 mmol/L (101-111)  L  02/15/21  05:36    


 


Carbon Dioxide  23 mmol/L (21-32)   02/15/21  05:36    


 


Anion Gap  13.0  (6-13)   02/15/21  05:36    


 


BUN  24 mg/dL (6-20)  H  02/15/21  05:36    


 


Creatinine  1.1 mg/dL (0.6-1.2)   02/15/21  05:36    


 


Estimated GFR (MDRD)  78  (>89)  L  02/15/21  05:36    


 


Glucose  112 mg/dL ()  H  02/15/21  05:36    


 


Calcium  8.9 mg/dL (8.5-10.3)   02/15/21  05:36    


 


Magnesium  2.0 mg/dL (1.7-2.8)   02/13/21  09:07    


 


Total Bilirubin  0.5 mg/dL (0.2-1.0)   02/12/21  19:05    


 


AST  27 IU/L (10-42)   02/12/21  19:05    


 


ALT  47 IU/L (10-60)   02/12/21  19:05    


 


Alkaline Phosphatase  117 IU/L ()   02/12/21  19:05    


 


Total Protein  7.4 g/dL (6.7-8.2)   02/12/21  19:05    


 


Albumin  3.9 g/dL (3.2-5.5)   02/12/21  19:05    


 


Globulin  3.5 g/dL (2.1-4.2)   02/12/21  19:05    


 


Albumin/Globulin Ratio  1.1  (1.0-2.2)   02/12/21  19:05    


 


Lipase  75 U/L (22-51)  H  02/12/21  19:05    


 


Urine Color  YELLOW   02/12/21  18:35    


 


Urine Clarity  CLEAR  (CLEAR)   02/12/21  18:35    


 


Urine pH  7.5 PH (5.0-7.5)   02/12/21  18:35    


 


Ur Specific Gravity  1.020  (1.002-1.030)   02/12/21  18:35    


 


Urine Protein  NEGATIVE mg/dL (NEGATIVE)   02/12/21  18:35    


 


Urine Glucose (UA)  NEGATIVE mg/dL (NEGATIVE)   02/12/21  18:35    


 


Urine Ketones  NEGATIVE mg/dL (NEGATIVE)   02/12/21  18:35    


 


Urine Occult Blood  NEGATIVE  (NEGATIVE)   02/12/21  18:35    


 


Urine Nitrite  NEGATIVE  (NEGATIVE)   02/12/21  18:35    


 


Urine Bilirubin  NEGATIVE  (NEGATIVE)   02/12/21  18:35    


 


Urine Urobilinogen  0.2 (NORMAL) E.U./dL (NORMAL)   02/12/21  18:35    


 


Ur Leukocyte Esterase  NEGATIVE  (NEGATIVE)   02/12/21  18:35    


 


Ur Microscopic Review  NOT INDICATED   02/12/21  18:35    


 


Urine Culture Comments  NOT INDICATED   02/12/21  18:35    


 


Nasal Adenovirus (PCR)  NOT DETECTED   02/12/21  19:05    


 


Nasal B. parapertussis DNA (PCR)  NOT DETECTED   02/12/21  19:05    


 


Nasal Coronavir 229E PCR  NOT DETECTED   02/12/21  19:05    


 


Nasal Coronavir HKU1 PCR  NOT DETECTED   02/12/21  19:05    


 


Nasal Coronavir NL63 PCR  NOT DETECTED   02/12/21  19:05    


 


Nasal Coronavir OC43 PCR  NOT DETECTED   02/12/21  19:05    


 


Nasal Enterovir/Rhinovir PCR  NOT DETECTED   02/12/21  19:05    


 


Nasal Influenza B PCR  NOT DETECTED   02/12/21  19:05    


 


Nasal Influenza A PCR  NOT DETECTED   02/12/21  19:05    


 


Nasal Parainfluen 1 PCR  NOT DETECTED   02/12/21  19:05    


 


Nasal Parainfluen 2 PCR  NOT DETECTED   02/12/21  19:05    


 


Nasal Parainfluen 3 PCR  NOT DETECTED   02/12/21  19:05    


 


Nasal Parainfluen 4 PCR  NOT DETECTED   02/12/21  19:05    


 


Nasal RSV (PCR)  NOT DETECTED   02/12/21  19:05    


 


Nasal B.pertussis DNA PCR  NOT DETECTED   02/12/21  19:05    


 


Nasal C.pneumoniae (PCR)  NOT DETECTED   02/12/21  19:05    


 


Augusto Human Metapneumo PCR  NOT DETECTED   02/12/21  19:05    


 


Nasal M.pneumoniae (PCR)  NOT DETECTED   02/12/21  19:05    


 


Nasal SARS-CoV-2 (PCR)  NOT DETECTED   02/12/21  19:05    














- Procedures


Procedures: 





Procedures





CENTRAL VENOUS CATHETER PLACEMENT WITH GUIDANCE (01/02/15)


CONTINUOUS INVASIVE MECHANICAL VENTILATION <96 CONSEC HRS (01/02/15)


INSERT ENDOTRACHEAL TUBE (01/02/15)


INSERT INFUSION DEV IN R INT JUGULAR VEIN, PERC (02/04/16)


VENOUS CATHETERIZATION NEC (12/19/13)











ABX Reporting


Has patient been on IV antibiotics over the past 48 hours?: No

## 2021-02-23 ENCOUNTER — HOSPITAL ENCOUNTER (OUTPATIENT)
Dept: HOSPITAL 76 - LAB | Age: 31
Discharge: HOME | End: 2021-02-23
Attending: INTERNAL MEDICINE
Payer: MEDICAID

## 2021-02-23 DIAGNOSIS — D72.828: Primary | ICD-10-CM

## 2021-02-23 DIAGNOSIS — E83.42: ICD-10-CM

## 2021-02-23 DIAGNOSIS — E87.6: ICD-10-CM

## 2021-02-23 LAB
ANION GAP SERPL CALCULATED.4IONS-SCNC: 12 MMOL/L (ref 6–13)
BASOPHILS NFR BLD AUTO: 0.1 10^3/UL (ref 0–0.1)
BASOPHILS NFR BLD AUTO: 0.5 %
BUN SERPL-MCNC: 26 MG/DL (ref 6–20)
CALCIUM UR-MCNC: 9.3 MG/DL (ref 8.5–10.3)
CHLORIDE SERPL-SCNC: 102 MMOL/L (ref 101–111)
CO2 SERPL-SCNC: 23 MMOL/L (ref 21–32)
CREAT SERPLBLD-SCNC: 1.1 MG/DL (ref 0.6–1.2)
EOSINOPHIL # BLD AUTO: 0.4 10^3/UL (ref 0–0.7)
EOSINOPHIL NFR BLD AUTO: 3.2 %
ERYTHROCYTE [DISTWIDTH] IN BLOOD BY AUTOMATED COUNT: 13.7 % (ref 12–15)
GLUCOSE SERPL-MCNC: 79 MG/DL (ref 70–100)
HGB UR QL STRIP: 17.7 G/DL (ref 14–18)
LYMPHOCYTES # SPEC AUTO: 2.7 10^3/UL (ref 1.5–3.5)
LYMPHOCYTES NFR BLD AUTO: 20.7 %
MAGNESIUM SERPL-MCNC: 2.3 MG/DL (ref 1.7–2.8)
MCH RBC QN AUTO: 29.4 PG (ref 27–31)
MCHC RBC AUTO-ENTMCNC: 33.8 G/DL (ref 32–36)
MCV RBC AUTO: 87 FL (ref 80–94)
MONOCYTES # BLD AUTO: 0.9 10^3/UL (ref 0–1)
MONOCYTES NFR BLD AUTO: 7.1 %
NEUTROPHILS # BLD AUTO: 8.6 10^3/UL (ref 1.5–6.6)
NEUTROPHILS # SNV AUTO: 12.9 X10^3/UL (ref 4.8–10.8)
NEUTROPHILS NFR BLD AUTO: 66.9 %
PDW BLD AUTO: 8.1 FL (ref 7.4–11.4)
PLATELET # BLD: 346 10^3/UL (ref 130–450)
RBC MAR: 6.02 10^6/UL (ref 4.7–6.1)

## 2021-02-23 PROCEDURE — 80048 BASIC METABOLIC PNL TOTAL CA: CPT

## 2021-02-23 PROCEDURE — 83735 ASSAY OF MAGNESIUM: CPT

## 2021-02-23 PROCEDURE — 85025 COMPLETE CBC W/AUTO DIFF WBC: CPT

## 2021-02-23 PROCEDURE — 36415 COLL VENOUS BLD VENIPUNCTURE: CPT

## 2021-03-30 ENCOUNTER — HOSPITAL ENCOUNTER (OUTPATIENT)
Dept: HOSPITAL 76 - LAB | Age: 31
Discharge: HOME | End: 2021-03-30
Attending: INTERNAL MEDICINE
Payer: MEDICAID

## 2021-03-30 DIAGNOSIS — E83.42: ICD-10-CM

## 2021-03-30 DIAGNOSIS — E87.6: Primary | ICD-10-CM

## 2021-03-30 LAB
ANION GAP SERPL CALCULATED.4IONS-SCNC: 16 MMOL/L (ref 6–13)
BUN SERPL-MCNC: 18 MG/DL (ref 6–20)
CALCIUM UR-MCNC: 10.1 MG/DL (ref 8.5–10.3)
CHLORIDE SERPL-SCNC: 96 MMOL/L (ref 101–111)
CO2 SERPL-SCNC: 21 MMOL/L (ref 21–32)
CREAT SERPLBLD-SCNC: 1.1 MG/DL (ref 0.6–1.2)
GFRSERPLBLD MDRD-ARVRAT: 77 ML/MIN/{1.73_M2} (ref 89–?)
GLUCOSE SERPL-MCNC: 136 MG/DL (ref 70–100)
MAGNESIUM SERPL-MCNC: 2.3 MG/DL (ref 1.7–2.8)
POTASSIUM SERPL-SCNC: 2.9 MMOL/L (ref 3.5–5)
SODIUM SERPLBLD-SCNC: 133 MMOL/L (ref 135–145)

## 2021-03-30 PROCEDURE — 80048 BASIC METABOLIC PNL TOTAL CA: CPT

## 2021-03-30 PROCEDURE — 36415 COLL VENOUS BLD VENIPUNCTURE: CPT

## 2021-03-30 PROCEDURE — 83735 ASSAY OF MAGNESIUM: CPT

## 2021-04-08 ENCOUNTER — HOSPITAL ENCOUNTER (OUTPATIENT)
Dept: HOSPITAL 76 - LAB | Age: 31
Discharge: HOME | End: 2021-04-08
Attending: INTERNAL MEDICINE
Payer: MEDICAID

## 2021-04-08 DIAGNOSIS — E87.6: Primary | ICD-10-CM

## 2021-04-08 DIAGNOSIS — E83.42: ICD-10-CM

## 2021-04-08 LAB
ANION GAP SERPL CALCULATED.4IONS-SCNC: 11 MMOL/L (ref 6–13)
BUN SERPL-MCNC: 17 MG/DL (ref 6–20)
CALCIUM UR-MCNC: 9.5 MG/DL (ref 8.5–10.3)
CHLORIDE SERPL-SCNC: 103 MMOL/L (ref 101–111)
CO2 SERPL-SCNC: 22 MMOL/L (ref 21–32)
CREAT SERPLBLD-SCNC: 1 MG/DL (ref 0.6–1.2)
GFRSERPLBLD MDRD-ARVRAT: 86 ML/MIN/{1.73_M2} (ref 89–?)
GLUCOSE SERPL-MCNC: 81 MG/DL (ref 70–100)
MAGNESIUM SERPL-MCNC: 2.4 MG/DL (ref 1.7–2.8)
POTASSIUM SERPL-SCNC: 3.6 MMOL/L (ref 3.5–5)
SODIUM SERPLBLD-SCNC: 136 MMOL/L (ref 135–145)

## 2021-04-08 PROCEDURE — 83735 ASSAY OF MAGNESIUM: CPT

## 2021-04-08 PROCEDURE — 80048 BASIC METABOLIC PNL TOTAL CA: CPT

## 2021-04-08 PROCEDURE — 36415 COLL VENOUS BLD VENIPUNCTURE: CPT

## 2021-05-20 ENCOUNTER — HOSPITAL ENCOUNTER (OUTPATIENT)
Dept: HOSPITAL 76 - LAB | Age: 31
Discharge: HOME | End: 2021-05-20
Attending: INTERNAL MEDICINE
Payer: MEDICAID

## 2021-05-20 DIAGNOSIS — E83.42: ICD-10-CM

## 2021-05-20 DIAGNOSIS — E87.6: Primary | ICD-10-CM

## 2021-05-20 LAB
ANION GAP SERPL CALCULATED.4IONS-SCNC: 12 MMOL/L (ref 6–13)
BUN SERPL-MCNC: 24 MG/DL (ref 6–20)
CALCIUM UR-MCNC: 8.9 MG/DL (ref 8.5–10.3)
CHLORIDE SERPL-SCNC: 102 MMOL/L (ref 101–111)
CO2 SERPL-SCNC: 20 MMOL/L (ref 21–32)
CREAT SERPLBLD-SCNC: 1.4 MG/DL (ref 0.6–1.2)
GFRSERPLBLD MDRD-ARVRAT: 58 ML/MIN/{1.73_M2} (ref 89–?)
GLUCOSE SERPL-MCNC: 123 MG/DL (ref 70–100)
MAGNESIUM SERPL-MCNC: 2 MG/DL (ref 1.7–2.8)
POTASSIUM SERPL-SCNC: 2.9 MMOL/L (ref 3.5–5)
SODIUM SERPLBLD-SCNC: 134 MMOL/L (ref 135–145)

## 2021-05-20 PROCEDURE — 80048 BASIC METABOLIC PNL TOTAL CA: CPT

## 2021-05-20 PROCEDURE — 36415 COLL VENOUS BLD VENIPUNCTURE: CPT

## 2021-05-20 PROCEDURE — 83735 ASSAY OF MAGNESIUM: CPT

## 2021-05-27 ENCOUNTER — HOSPITAL ENCOUNTER (OUTPATIENT)
Dept: HOSPITAL 76 - LAB | Age: 31
Discharge: HOME | End: 2021-05-27
Attending: INTERNAL MEDICINE
Payer: MEDICAID

## 2021-05-27 DIAGNOSIS — E83.42: ICD-10-CM

## 2021-05-27 DIAGNOSIS — E87.6: Primary | ICD-10-CM

## 2021-05-27 LAB
ANION GAP SERPL CALCULATED.4IONS-SCNC: 12 MMOL/L (ref 6–13)
BUN SERPL-MCNC: 24 MG/DL (ref 6–20)
CALCIUM UR-MCNC: 9.5 MG/DL (ref 8.5–10.3)
CHLORIDE SERPL-SCNC: 96 MMOL/L (ref 101–111)
CO2 SERPL-SCNC: 21 MMOL/L (ref 21–32)
CREAT SERPLBLD-SCNC: 1.1 MG/DL (ref 0.6–1.2)
GFRSERPLBLD MDRD-ARVRAT: 77 ML/MIN/{1.73_M2} (ref 89–?)
GLUCOSE SERPL-MCNC: 108 MG/DL (ref 70–100)
MAGNESIUM SERPL-MCNC: 2 MG/DL (ref 1.7–2.8)
POTASSIUM SERPL-SCNC: 3.1 MMOL/L (ref 3.5–5)
SODIUM SERPLBLD-SCNC: 129 MMOL/L (ref 135–145)

## 2021-05-27 PROCEDURE — 83735 ASSAY OF MAGNESIUM: CPT

## 2021-05-27 PROCEDURE — 80048 BASIC METABOLIC PNL TOTAL CA: CPT

## 2021-05-27 PROCEDURE — 36415 COLL VENOUS BLD VENIPUNCTURE: CPT

## 2021-06-10 NOTE — HISTORY & PHYSICAL EXAMINATION
DATE OF ADMISSION: 05/29/2017

 

PRIMARY CARE PROVIDER: Tori Kaur PA-C 

 

CHIEF COMPLAINT: Weakness, muscle discomfort.

 

HISTORY OF PRESENT ILLNESS: A 27-year-old male who has recurrent bouts of significant hypokalemia sec
ondary to his history of renal tubular acidosis type 2. It is not clear whether the patient is compli
ant with his medication. He has had history of rhabdomyolysis in the past with this, most recent admi
ssion was 05/23/2017 to 05/24/2017. 

 

Today his labs are significant for a sodium of 131, potassium less than 1.5, phosphorus 1.6, magnesiu
m 2.4. White count 26.5. Generally he does present with leukocytosis, never seen infectious process w
ith this. Suspect this is due to a stress reaction. Differential is still pending at the time of this
 dictation.

 

PAST MEDICAL HISTORY 

1. 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

2. Noncompliance with medication regimen.

3. Depression.

4. Anxiety.

5. Homelessness.

6. Tobacco abuse.

7. Marijuana abuse. 

 

MEDICATIONS UPON ADMISSION 

1. BuSpar 5 mg p.o. b.i.d.

2. Celexa 20 mg p.o. b.i.d.

3. Vicodin 1 tab p.o. q.6 h. p.r.n. pain.

4. Lorazepam 0.5 mg p.o. t.i.d. p.r.n. anxiety.

5. Potassium chloride 120 mEq p.o. q.i.d.

6. Propranolol 10 mg p.o. b.i.d.

7. Risperdal 2 mg p.o. q.p.m.

8. Spironolactone 25 mg p.o. b.i.d.

9. Trazodone 50 mg p.o. q.p.m. 

 

ALLERGIES: NO KNOWN DRUG ALLERGIES.

 

SOCIAL HISTORY: Homeless. 

 

HABITS

SMOKING: Smokes marijuana.

ALCOHOL: None.

 

FAMILY MEDICAL HISTORY: Cousin with a history of Gitelman disease. Mother with a history of hypertens
ion and depression. Father with history of diabetes and mental illness. Father and grandmother with c
oronary artery disease. 

 

REVIEW OF SYSTEMS: He does have some mild abdominal pain; denies any nausea or vomiting, denies any a
lteration in bowel movements. All other review of systems are reviewed and negative except as in HPI.


 

PHYSICAL EXAMINATION

VITAL SIGNS: Temperature is afebrile, heart rate 64, blood pressure 116/76, respiratory rate 16, room
 air saturation 97%.

CONSTITUTIONAL: A young male in mild pain distress.

HEENT: Head normocephalic, atraumatic. Eyes PERRLA-DC, EOMI. Mouth: No lesions.

NECK: No adenopathy.

CHEST: Clear to auscultation.

COR: Regular rate and rhythm, S1, S2 without murmur.

ABDOMEN: Soft, nontender. Bowel sounds present.

EXTREMITIES: No pedal edema.

SKIN: No rashes.

PSYCHIATRIC: Mood and affect are appropriate.

NEUROLOGIC: Alert and oriented x3. Motor strength is intact bilaterally.

 

LABORATORY

As above; also to include white count of 26.5, hemoglobin 18.2, hematocrit 52.6, platelets of 406. So
dium 131, potassium less than 1.5, chloride 96, bicarbonate 21, anion gap 14, BUN 16, creatinine 1.3,
 calculated GFR 65, glucose 129, calcium 9.5, phosphorus 1.6, magnesium 2.4, total bilirubin 0.8, AST
 27, ALT 48, alkaline phosphatase 101, total protein 8.9, albumin 4.5, lipase 53. 

 

EKG is pending. 

 

Urinalysis is pending.

 

ASSESSMENT AND PLAN

1. Acute-on-chronic hypokalemia secondary to renal tubular acidosis type 2, present on admission. Giv
e oral and IV potassium supplementation. Monitor electrolytes q.4 h. Also we will check CK to evaluat
e for possible rhabdomyolysis. Phosphate is also low, we will initially give IV potassium phosphate, 
check in a.m. basic metabolic panel, magnesium, phosphate. 

 

2. Anxiety, depression, personality schizoaffective disorder. Continue with current home medication r
egimen. 

 

3. Leukocytosis appears to be acute but recurrent with his admissions. We will check a urine. He is a
febrile, most likely stress leukemoid reaction. 

 

4. Deep venous thrombosis prophylaxis. We will use SCDs and subcutaneous prophylactic Lovenox. 

 

5. Code status. The patient is FULL CODE.

 

Time spent 60 minutes.

 

 

 

DD:05/29/2017 21:34:00  DT: 05/30/2017 00:26  JOB #: [id number]  EXT JOB #:[id number] [No Acute Distress] : no acute distress [Normal Oropharynx] : normal oropharynx [No Neck Mass] : no neck mass [Normal Appearance] : normal appearance [Normal Rate/Rhythm] : normal rate/rhythm [Normal S1, S2] : normal s1, s2 [No Murmurs] : no murmurs [No Resp Distress] : no resp distress [Clear to Auscultation Bilaterally] : clear to auscultation bilaterally [No Abnormalities] : no abnormalities [Normal Gait] : normal gait [Benign] : benign [No Clubbing] : no clubbing [No Cyanosis] : no cyanosis [No Edema] : no edema [FROM] : FROM [Normal Color/ Pigmentation] : normal color/ pigmentation [No Focal Deficits] : no focal deficits [Normal Affect] : normal affect [Oriented x3] : oriented x3 [II] : Mallampati Class: II [TextBox_11] : polyp left shoulder [TextBox_68] : I:E ratio 1:3; clear

## 2022-05-13 ENCOUNTER — HOSPITAL ENCOUNTER (OUTPATIENT)
Dept: HOSPITAL 76 - EMS | Age: 32
End: 2022-05-13
Payer: MEDICAID

## 2022-05-13 DIAGNOSIS — Z03.89: Primary | ICD-10-CM

## 2022-06-08 ENCOUNTER — HOSPITAL ENCOUNTER (INPATIENT)
Dept: HOSPITAL 76 - ED | Age: 32
LOS: 1 days | Discharge: LEFT BEFORE DISCHARGE | DRG: 699 | End: 2022-06-09
Attending: INTERNAL MEDICINE | Admitting: SPECIALIST
Payer: MEDICAID

## 2022-06-08 ENCOUNTER — HOSPITAL ENCOUNTER (OUTPATIENT)
Dept: HOSPITAL 76 - EMS | Age: 32
Discharge: ANOTHER HOSPITAL | End: 2022-06-08
Payer: MEDICAID

## 2022-06-08 DIAGNOSIS — M79.601: ICD-10-CM

## 2022-06-08 DIAGNOSIS — M79.604: ICD-10-CM

## 2022-06-08 DIAGNOSIS — R45.4: ICD-10-CM

## 2022-06-08 DIAGNOSIS — R11.2: ICD-10-CM

## 2022-06-08 DIAGNOSIS — E86.0: ICD-10-CM

## 2022-06-08 DIAGNOSIS — R53.83: ICD-10-CM

## 2022-06-08 DIAGNOSIS — N15.8: Primary | ICD-10-CM

## 2022-06-08 DIAGNOSIS — Z53.29: ICD-10-CM
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
## 2022-06-08 DIAGNOSIS — R53.81: Primary | ICD-10-CM

## 2022-06-08 DIAGNOSIS — M79.605: ICD-10-CM

## 2022-06-08 DIAGNOSIS — Z91.128: ICD-10-CM

## 2022-06-08 DIAGNOSIS — R77.8: ICD-10-CM

## 2022-06-08 DIAGNOSIS — N17.9: ICD-10-CM

## 2022-06-08 DIAGNOSIS — T50.916A: ICD-10-CM

## 2022-06-08 DIAGNOSIS — F41.8: ICD-10-CM

## 2022-06-08 DIAGNOSIS — R53.1: ICD-10-CM

## 2022-06-08 DIAGNOSIS — Z87.891: ICD-10-CM

## 2022-06-08 DIAGNOSIS — E87.1: ICD-10-CM

## 2022-06-08 DIAGNOSIS — Z20.822: ICD-10-CM

## 2022-06-08 DIAGNOSIS — D72.829: ICD-10-CM

## 2022-06-08 DIAGNOSIS — E87.6: ICD-10-CM

## 2022-06-08 DIAGNOSIS — R19.7: ICD-10-CM

## 2022-06-08 DIAGNOSIS — R63.8: ICD-10-CM

## 2022-06-08 DIAGNOSIS — N25.89: ICD-10-CM

## 2022-06-08 LAB
ANION GAP SERPL CALCULATED.4IONS-SCNC: 13 MMOL/L (ref 6–13)
BASOPHILS # BLD MANUAL: 0 10^3/UL (ref 0–0.1)
BASOPHILS NFR BLD AUTO: 0.2 %
BUN SERPL-MCNC: 26 MG/DL (ref 6–20)
CALCIUM UR-MCNC: 8 MG/DL (ref 8.5–10.3)
CASTS URNS QL MICRO: (no result) /LPF
CHLORIDE SERPL-SCNC: 93 MMOL/L (ref 101–111)
CLARITY UR REFRACT.AUTO: CLEAR
CO2 SERPL-SCNC: 19 MMOL/L (ref 21–32)
CREAT SERPLBLD-SCNC: 1.9 MG/DL (ref 0.6–1.2)
EOSINOPHIL # BLD MANUAL: 0 10^3/UL (ref 0–0.7)
EOSINOPHIL NFR BLD AUTO: 0.1 %
ERYTHROCYTE [DISTWIDTH] IN BLOOD BY AUTOMATED COUNT: 13.8 % (ref 12–15)
GFRSERPLBLD MDRD-ARVRAT: 41 ML/MIN/{1.73_M2} (ref 89–?)
GLUCOSE SERPL-MCNC: 109 MG/DL (ref 70–100)
GLUCOSE UR QL STRIP.AUTO: NEGATIVE MG/DL
HCT VFR BLD AUTO: 51.2 % (ref 42–52)
HGB UR QL STRIP: 19.2 G/DL (ref 14–18)
KETONES UR QL STRIP.AUTO: NEGATIVE MG/DL
LYMPH ABN NFR BLD MANUAL: 0 %
LYMPHOCYTES NFR BLD AUTO: 5.2 %
MCH RBC QN AUTO: 29 PG (ref 27–31)
MCHC RBC AUTO-ENTMCNC: 37.5 G/DL (ref 32–36)
MCV RBC AUTO: 77.3 FL (ref 80–94)
MONOCYTES NFR BLD AUTO: 5.7 %
NEUTROPHILS # SNV AUTO: 30.3 X10^3/UL (ref 4.8–10.8)
NEUTROPHILS NFR BLD AUTO: 87.7 %
NITRITE UR QL STRIP.AUTO: NEGATIVE
PDW BLD AUTO: 8.2 FL (ref 7.4–11.4)
PH UR STRIP.AUTO: 7 PH (ref 5–7.5)
PLATELET # BLD: 463 10^3/UL (ref 130–450)
POTASSIUM SERPL-SCNC: < 1.5 MMOL/L (ref 3.5–5)
PROT UR STRIP.AUTO-MCNC: 30 MG/DL
RBC # UR STRIP.AUTO: (no result) /UL
RBC # URNS HPF: (no result) /HPF (ref 0–5)
RBC MAR: 6.62 10^6/UL (ref 4.7–6.1)
SODIUM SERPLBLD-SCNC: 125 MMOL/L (ref 135–145)
SP GR UR STRIP.AUTO: 1.01 (ref 1–1.03)
SQUAMOUS URNS QL MICRO: (no result)
UROBILINOGEN UR QL STRIP.AUTO: (no result) E.U./DL
UROBILINOGEN UR STRIP.AUTO-MCNC: NEGATIVE MG/DL
WBC # UR MANUAL: (no result) /HPF (ref 0–3)

## 2022-06-08 PROCEDURE — 99285 EMERGENCY DEPT VISIT HI MDM: CPT

## 2022-06-08 PROCEDURE — 96374 THER/PROPH/DIAG INJ IV PUSH: CPT

## 2022-06-08 PROCEDURE — 82330 ASSAY OF CALCIUM: CPT

## 2022-06-08 PROCEDURE — 99283 EMERGENCY DEPT VISIT LOW MDM: CPT

## 2022-06-08 PROCEDURE — 81001 URINALYSIS AUTO W/SCOPE: CPT

## 2022-06-08 PROCEDURE — 80053 COMPREHEN METABOLIC PANEL: CPT

## 2022-06-08 PROCEDURE — 87086 URINE CULTURE/COLONY COUNT: CPT

## 2022-06-08 PROCEDURE — 83735 ASSAY OF MAGNESIUM: CPT

## 2022-06-08 PROCEDURE — 81003 URINALYSIS AUTO W/O SCOPE: CPT

## 2022-06-08 PROCEDURE — 36415 COLL VENOUS BLD VENIPUNCTURE: CPT

## 2022-06-08 PROCEDURE — 93005 ELECTROCARDIOGRAM TRACING: CPT

## 2022-06-08 PROCEDURE — 84132 ASSAY OF SERUM POTASSIUM: CPT

## 2022-06-08 PROCEDURE — 83690 ASSAY OF LIPASE: CPT

## 2022-06-08 PROCEDURE — 85025 COMPLETE CBC W/AUTO DIFF WBC: CPT

## 2022-06-08 PROCEDURE — 84484 ASSAY OF TROPONIN QUANT: CPT

## 2022-06-08 PROCEDURE — 87633 RESP VIRUS 12-25 TARGETS: CPT

## 2022-06-08 PROCEDURE — 96375 TX/PRO/DX INJ NEW DRUG ADDON: CPT

## 2022-06-08 PROCEDURE — 80048 BASIC METABOLIC PNL TOTAL CA: CPT

## 2022-06-08 PROCEDURE — 84100 ASSAY OF PHOSPHORUS: CPT

## 2022-06-08 PROCEDURE — 87150 DNA/RNA AMPLIFIED PROBE: CPT

## 2022-06-08 PROCEDURE — 71045 X-RAY EXAM CHEST 1 VIEW: CPT

## 2022-06-08 RX ADMIN — SODIUM CHLORIDE AND POTASSIUM CHLORIDE SCH MLS/HR: 9; 1.49 INJECTION, SOLUTION INTRAVENOUS at 21:05

## 2022-06-08 RX ADMIN — POTASSIUM CHLORIDE SCH MLS/HR: 7.46 INJECTION, SOLUTION INTRAVENOUS at 23:11

## 2022-06-08 RX ADMIN — POTASSIUM CHLORIDE SCH MLS/HR: 7.46 INJECTION, SOLUTION INTRAVENOUS at 21:05

## 2022-06-08 RX ADMIN — POTASSIUM CHLORIDE SCH MLS/HR: 7.46 INJECTION, SOLUTION INTRAVENOUS at 22:07

## 2022-06-08 NOTE — HISTORY & PHYSICAL EXAMINATION
History and Physical





- History and Physical


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  He is well-known to our service.  He is followed

by Dr. Macdonald at [facility], nephrology.  He states that he does 

have a primary care provider but he has not seen her in years.  About 2 months 

ago he stopped taking his medications.  Cannot really state why.  There is no 

philosophical decision here.  He just "says I just stopped taking them".  In 

addition he has developed nausea over the last 2 to 3 weeks and its getting 

worse.  Keeping anything down has been problematic.  He lives with his mother 

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  His last admission to our service was February 12, 2021 and he was discharged on February 15, 2021.





His current symptomatology is that of nausea, severe weakness.  It is hard for 

him to even sit up or use his arms to dress himself.  He denies fever, chills.  

He denies cough, shortness of breath.  There is no diarrhea.  The emergency room

that he was having diarrhea but he is telling me that he is not.  I asked him 

about that and he says he does not remember. There is occasional emesis.  There 

is no vomiting of blood.  There is no change in the color of his stool.  He 

denies urgency, frequency.





He is described as weak, fatigued, frequent PACs on telemetry.  Potassium was 

1.4.  Creatinine is 2.  White cell count is over 30,000.  Chest x-ray shows a 

possible right lower lung infiltrate versus atelectasis.  In the emergency room 

he is already started receiving potassium riders.  We are now being asked to 

admit him to our service.











Past medical history:


1.  Renal tubular acidosis, Gitelman's disease


2.  Depression with anxiety/anger disorder


3.  Morbid obesity





No known drug allergies





Medications are none.  He says he is not taking anything by prescription for 

several weeks now





Social history: He is originally from [place].  They moved to [facility] in 

2015 approximately.  He is unemployed and is on SSI and currently seeking to 

increase his income through disability.  He lives with his mom and dad.  He s

mokes marijuana about twice a day.  In the past has tried methamphetamines and 

other substances but has not used recreational substances on a regular basis 

other than the marijuana.  He states he no longer smokes tobacco for the last 2 

years.





Family history:


Dad, alive, has diabetes and hypertension


Mom, alive, has high blood pressure


1 full-blooded brother, 1 full-blooded sister.  Sister has the same renal 

tubular acidosis he does.  He has 2 half siblings with his mother and they are 

healthy.


No birth children





CODE STATUS: He states he does not know what he wants.  And as such by default 

he will be a full code.





Previous level of function: He is usually able to dress himself, feed himself, 

does not drive a car.





Review of systems a comprehensive review of systems was performed and the p

ertinent positives and negatives are stated above in the history physical and 

the remainder of review of systems is negative.  A 13 point review of systems 

was done.





Physical exam:


Moderately overweight, alert  male, mom is at the bedside.  He is

alert, oriented, speech is lucid.  Cooperative.


Temperature is 36.7.  Heart rate 77.  Blood pressure 111/69.  Respirations 24.  

96% on room air.


Neck is supple without JVD. Dry mouth.


Lungs are clear to auscultation and percussion without rhonchi, wheezing, 

crackles.  Slow shallow respiration is unlabored.  Even with speaking to me


Cardiac exam has a regular rate and rhythm.  Occasionally tachycardic on 

telemetry with multifocal PACs on telemetry.


Abdomen is obese, soft, nontender with normal bowel sounds.  No hepatomegaly


Extremities are warm, no clubbing cyanosis or edema


Skin has multiple tattoos, but no skin breakdown, venous stasis, or decubiti


Neurologically he is alert and oriented to person place and time.  Cooperative. 

Able to move his arms and lift them above the head of my request but he cannot 

hold them up.  They come down almost immediately because are so weak.  Same with

his legs.  He cannot sit up because he is too weak.  Reflexes are hypoactive.





His sinus rhythm with a prolonged AK and prolonged QT interval.  He has lateral 

wave/wall ST depression.





White cell count is 30.28.  Hemoglobin 19.2.  Hematocrit 51.2.  Platelets 463.  

89% neutrophils, 30% bandemia.  Sodium 126, potassium 1.4.  BUN 28, creatinine 

2.0.





Troponin #1 is 177, troponin #2 is 170





Chest x-ray reported by Dr. Roblero as right lower lobe changes indicative of 

either early pneumonia or atelectasis.











Assessment/plan:





1.  Hypokalemia due to a renal tubular acidosis disease that is genetic. 

Gitelman's disease. Patient is placed in ICU due to risk of arrhythmia.  

Inpatient status.  He received back-to-back potassium riders.Check sodium 

[id number]. Check K q 6 hours. 





2.  Noncompliance with medical regimen.  Patient does not offer any insight as 

to why he is noncompliant.  I carefully queried to make sure that he did not 

want end-of-life care or had somehow made a decision that he no longer wanted to

be in the medical system, but he said no.  He has never thought about it that 

way.  As such I asked him about CODE STATUS and he said that he is never thought

about that either and by default he will be full code.





3.  Elevated white cell count.  Patient has no fever, no complaints of infection

on review of systems.  Chest x-ray does suggest possible atelectasis versus 

infiltrate.  Since he is asymptomatic I will opt not to treat with antibiotics 

until he manifest symptoms.





4.  Dehydration.  Seen with dry oral mucosa, as well as concentrated hemoglobin.

 The patient will be receiving 100 cc an hour of fluids with his potassium 

riders.  We will check BMP in the morning.Regular diet.

## 2022-06-09 VITALS — SYSTOLIC BLOOD PRESSURE: 109 MMHG | DIASTOLIC BLOOD PRESSURE: 78 MMHG

## 2022-06-09 LAB
ALBUMIN DIAFP-MCNC: 3.3 G/DL (ref 3.2–5.5)
ALBUMIN/GLOB SERPL: 0.7 {RATIO} (ref 1–2.2)
ALP SERPL-CCNC: 88 IU/L (ref 42–121)
ALT SERPL W P-5'-P-CCNC: 27 IU/L (ref 10–60)
ANION GAP SERPL CALCULATED.4IONS-SCNC: 13 MMOL/L (ref 6–13)
ANION GAP SERPL CALCULATED.4IONS-SCNC: 15 MMOL/L (ref 6–13)
AST SERPL W P-5'-P-CCNC: 29 IU/L (ref 10–42)
B PARAPERT DNA SPEC QL NAA+PROBE: NOT DETECTED
B PERT DNA SPEC QL NAA+PROBE: NOT DETECTED
BASOPHILS NFR BLD AUTO: 0 10^3/UL (ref 0–0.1)
BASOPHILS NFR BLD AUTO: 0.2 %
BILIRUB BLD-MCNC: 0.7 MG/DL (ref 0.2–1)
BUN SERPL-MCNC: 23 MG/DL (ref 6–20)
BUN SERPL-MCNC: 28 MG/DL (ref 6–20)
C PNEUM DNA NPH QL NAA+NON-PROBE: NOT DETECTED
CA-I SERPL ISE-MCNC: 1.04 MMOL/L (ref 1.15–1.33)
CA-I SERPL ISE-MCNC: 1.05 MMOL/L (ref 1.15–1.33)
CALCIUM UR-MCNC: 8 MG/DL (ref 8.5–10.3)
CALCIUM UR-MCNC: 8.3 MG/DL (ref 8.5–10.3)
CHLORIDE SERPL-SCNC: 90 MMOL/L (ref 101–111)
CHLORIDE SERPL-SCNC: 98 MMOL/L (ref 101–111)
CO2 SERPL-SCNC: 20 MMOL/L (ref 21–32)
CO2 SERPL-SCNC: 21 MMOL/L (ref 21–32)
CREAT SERPLBLD-SCNC: 1.8 MG/DL (ref 0.6–1.2)
CREAT SERPLBLD-SCNC: 2 MG/DL (ref 0.6–1.2)
EOSINOPHIL # BLD AUTO: 0.1 10^3/UL (ref 0–0.7)
EOSINOPHIL NFR BLD AUTO: 0.3 %
ERYTHROCYTE [DISTWIDTH] IN BLOOD BY AUTOMATED COUNT: 13.7 % (ref 12–15)
FLUAV RNA RESP QL NAA+PROBE: NOT DETECTED
GFRSERPLBLD MDRD-ARVRAT: 38 ML/MIN/{1.73_M2} (ref 89–?)
GFRSERPLBLD MDRD-ARVRAT: 43 ML/MIN/{1.73_M2} (ref 89–?)
GLOBULIN SER-MCNC: 4.8 G/DL (ref 2.1–4.2)
GLUCOSE SERPL-MCNC: 109 MG/DL (ref 70–100)
GLUCOSE SERPL-MCNC: 116 MG/DL (ref 70–100)
HAEM INFLU B DNA SPEC QL NAA+PROBE: NOT DETECTED
HCOV 229E RNA SPEC QL NAA+PROBE: NOT DETECTED
HCOV HKU1 RNA UPPER RESP QL NAA+PROBE: NOT DETECTED
HCOV NL63 RNA ASPIRATE QL NAA+PROBE: NOT DETECTED
HCOV OC43 RNA SPEC QL NAA+PROBE: NOT DETECTED
HCT VFR BLD AUTO: 46.1 % (ref 42–52)
HGB UR QL STRIP: 17 G/DL (ref 14–18)
HMPV AG SPEC QL: NOT DETECTED
HPIV1 RNA NPH QL NAA+PROBE: NOT DETECTED
HPIV2 SPEC QL CULT: NOT DETECTED
HPIV3 AB TITR SER CF: NOT DETECTED {TITER}
HPIV4 RNA SPEC QL NAA+PROBE: NOT DETECTED
LIPASE SERPL-CCNC: 88 U/L (ref 22–51)
LYMPHOBLASTS # BLD: 6 %
LYMPHOCYTES # BLD MANUAL: 1.8 10^3/UL (ref 1.5–3.5)
LYMPHOCYTES # SPEC AUTO: 1.6 10^3/UL (ref 1.5–3.5)
LYMPHOCYTES NFR BLD AUTO: 8.4 %
M PNEUMO DNA SPEC QL NAA+PROBE: NOT DETECTED
MAGNESIUM SERPL-MCNC: 2.8 MG/DL (ref 1.7–2.8)
MANUAL DIF COMMENT BLD-IMP: (no result)
MCH RBC QN AUTO: 29.4 PG (ref 27–31)
MCHC RBC AUTO-ENTMCNC: 36.9 G/DL (ref 32–36)
MCV RBC AUTO: 79.8 FL (ref 80–94)
METAMYELOCYTES NFR BLD: 1 %
MONOCYTES # BLD AUTO: 0.9 10^3/UL (ref 0–1)
MONOCYTES # BLD MANUAL: 0.3 10^3/UL (ref 0–1)
MONOCYTES NFR BLD AUTO: 5.1 %
NEUTROPHILS # BLD AUTO: 15.8 10^3/UL (ref 1.5–6.6)
NEUTROPHILS # SNV AUTO: 18.6 X10^3/UL (ref 4.8–10.8)
NEUTROPHILS NFR BLD AUTO: 85.2 %
NEUTROPHILS NFR BLD MANUAL: 27.9 10^3/UL (ref 1.5–6.6)
NEUTS BAND NFR BLD: 3 %
NRBC # BLD AUTO: 0 /100WBC
NRBC # BLD AUTO: 0 X10^3/UL
PDW BLD AUTO: 9.6 FL (ref 7.4–11.4)
PH BLDV: 7.38 [PH] (ref 7.31–7.41)
PH BLDV: 7.43 [PH] (ref 7.31–7.41)
PHOSPHATE BLD-MCNC: 1.2 MG/DL (ref 2.5–4.6)
PHOSPHATE BLD-MCNC: 3.4 MG/DL (ref 2.5–4.6)
PLAT MORPH BLD: (no result)
PLATELET # BLD: 299 10^3/UL (ref 130–450)
PLATELET BLD QL SMEAR: (no result)
POTASSIUM SERPL-SCNC: < 1.5 MMOL/L (ref 3.5–5)
POTASSIUM SERPL-SCNC: < 1.5 MMOL/L (ref 3.5–5)
PROT SPEC-MCNC: 8.1 G/DL (ref 6.7–8.2)
RBC MAR: 5.78 10^6/UL (ref 4.7–6.1)
RBC MORPH BLD: (no result)
RSV RNA RESP QL NAA+PROBE: NOT DETECTED
RV+EV RNA SPEC QL NAA+PROBE: NOT DETECTED
SARS-COV-2 RNA PNL SPEC NAA+PROBE: NOT DETECTED
SODIUM SERPLBLD-SCNC: 126 MMOL/L (ref 135–145)
SODIUM SERPLBLD-SCNC: 131 MMOL/L (ref 135–145)
WBC MORPH BLD: (no result)

## 2022-06-09 RX ADMIN — POTASSIUM CHLORIDE SCH MLS/HR: 7.46 INJECTION, SOLUTION INTRAVENOUS at 09:04

## 2022-06-09 RX ADMIN — POTASSIUM CHLORIDE SCH MLS/HR: 7.46 INJECTION, SOLUTION INTRAVENOUS at 10:06

## 2022-06-09 RX ADMIN — SODIUM CHLORIDE AND POTASSIUM CHLORIDE SCH MLS/HR: 9; 1.49 INJECTION, SOLUTION INTRAVENOUS at 07:14

## 2022-06-09 RX ADMIN — POTASSIUM CHLORIDE SCH MLS/HR: 7.46 INJECTION, SOLUTION INTRAVENOUS at 11:06

## 2022-06-09 RX ADMIN — POTASSIUM CHLORIDE SCH MLS/HR: 7.46 INJECTION, SOLUTION INTRAVENOUS at 03:22

## 2022-06-09 RX ADMIN — POTASSIUM CHLORIDE SCH MLS/HR: 7.46 INJECTION, SOLUTION INTRAVENOUS at 00:11

## 2022-06-09 RX ADMIN — POTASSIUM CHLORIDE SCH MLS/HR: 7.46 INJECTION, SOLUTION INTRAVENOUS at 02:19

## 2022-06-09 RX ADMIN — CALCIUM CARBONATE (ANTACID) CHEW TAB 500 MG SCH MG: 500 CHEW TAB at 08:09

## 2022-06-09 RX ADMIN — CALCIUM CARBONATE (ANTACID) CHEW TAB 500 MG SCH MG: 500 CHEW TAB at 11:57

## 2022-06-09 RX ADMIN — POTASSIUM CHLORIDE SCH MLS/HR: 7.46 INJECTION, SOLUTION INTRAVENOUS at 04:28

## 2022-06-09 RX ADMIN — POTASSIUM CHLORIDE SCH MLS/HR: 7.46 INJECTION, SOLUTION INTRAVENOUS at 01:16

## 2022-06-09 RX ADMIN — POTASSIUM CHLORIDE SCH MLS/HR: 7.46 INJECTION, SOLUTION INTRAVENOUS at 13:47

## 2022-06-09 RX ADMIN — POTASSIUM CHLORIDE SCH: 7.46 INJECTION, SOLUTION INTRAVENOUS at 12:22

## 2022-06-09 NOTE — DISCHARGE PLAN
Discharge Plan


Problem Reviewed?: Yes


Disposition: 07 Against Medical Advice


Condition: Critical


Prescriptions: 


Potassium Chloride [Klor-Con] 80 meq PO QID 30 Days #480 ea


Health Concerns: 


You are admitted to the hospital because of low potassium.  We recommend that 

you stay hospitalized but you prefer to be discharged home given you are feeling

better.  Your potassium is still dangerously low at less than 1.5.  I have sent 

a prescription to [facility] for potassium until you can follow-up with your 

primary care physician for future refills.  Please return to the emergency 

department if you develop any worsening symptoms such as passing out, chest 

pain, palpitations or muscle aches/pain.


No Smoking: If you smoke, Please STOP!  Call [phone] for help.

## 2022-06-09 NOTE — PROVIDER PROGRESS NOTE
Subjective





- Prog Note Date


Prog Note Date: 06/09/22





Current Medications





- Current Medications


Current Medications: 








Active Medications





Calcium Carbonate/Glycine (Calcium Carbonate Chew 500 Mg Tablet)  1,250 mg PO 

Q4H [facility]; Protocol


   Stop: 06/09/22 12:01


   Last Admin: 06/09/22 08:09 Dose:  1,250 mg


   


Enoxaparin Sodium (Enoxaparin 40 Mg/0.4 Ml Syringe)  40 mg SUBQ DAILY [facility]


   Last Admin: 06/09/22 08:10 Dose:  Not Given


   


Potassium Chloride/Sodium Chloride (Normal Saline 0.9% W/20 Meq Kcl)  1,000 mls 

@ 100 mls/hr IV .Q10H [facility]


   Last Infusion: 06/09/22 08:00 Dose:  100 mls/hr


   


Potassium Chloride (Potassium Chloride)  10 meq in 100 mls @ 100 mls/hr IV Q1H 

NEISHA


   Stop: 06/09/22 16:59


Sodium Chloride (Normal Saline 0.9%)  1,000 mls @ 999 mls/hr IV ONCE ONE


   Stop: 06/09/22 09:28


Lorazepam (Lorazepam 2 Mg/Ml Vial)  0.5 mg IVP Q2H PRN


   PRN Reason: Anxiety


   Last Admin: 06/08/22 21:02 Dose:  0.5 mg


   


Ondansetron HCl (Ondansetron Odt 4 Mg Tablet)  4 mg TL Q4HR PRN


   PRN Reason: Nausea / Vomiting


Polyethylene Glycol (Polyethylene Glycol 3350 17 Gm Packet)  17 gm PO DAILY [facility]


   Last Admin: 06/09/22 08:10 Dose:  Not Given


   


Potassium Chloride (Potassium Chloride 20 Meq Tablet)  40 meq PO TIDWM [facility]





                                        





Spironolactone 12.5 mg PO BID 10/16/19 


Lisinopril [Zestril] 2.5 mg PO DAILY 02/12/21 


Ergocalciferol (Vitamin D2) [Vitamin D2] 1.25 mg PO Q7D 02/13/21 


Potassium Chloride 100 meq PO QID 02/13/21 











Objective





- Vital Signs/Intake & Output


Reviewed Vital Signs: Yes


Vital Signs: 





                                   Vital Signs











  Pulse Resp BP Pulse Ox


 


 06/09/22 07:00  88  15  108/71  96


 


 06/09/22 06:00  82  21  108/71  96


 


 06/09/22 05:00  81  24  94/68  94


 


 06/09/22 04:00  83  22  113/56 L  96











Intake & Output: 





                                 Intake & Output











 06/06/22 06/07/22 06/08/22 06/09/22





 23:59 23:59 23:59 23:59


 


Intake Total   200 2100


 


Output Total   900 1575


 


Balance   -700 525














- Lab Results


Fish Bones: 


                                 06/09/22 07:01





                                 06/09/22 07:01


Other Labs: 





                               Lab Results x24hrs











  06/09/22 06/08/22 06/08/22 Range/Units





  07:01 21:44 21:40 


 


WBC     (4.8-10.8)  x10^3/uL


 


RBC     (4.70-6.10)  10^6/uL


 


Hgb     (14.0-18.0)  g/dL


 


Hct     (42.0-52.0)  %


 


MCV     (80.0-94.0)  fL


 


MCH     (27.0-31.0)  pg


 


MCHC     (32.0-36.0)  g/dL


 


RDW     (12.0-15.0)  %


 


Plt Count     (130-450)  10^3/uL


 


MPV     (7.4-11.4)  fL


 


Neut # (Auto)     


 


Lymph # (Auto)     


 


Mono # (Auto)     


 


Eos # (Auto)     


 


Baso # (Auto)     


 


Absolute Nucleated RBC     


 


Total Counted     


 


Band Neuts % (Manual)    (0 - 10) %


 


Abnorm Lymph % (Manual)     %


 


Metamyelocytes %    ( - 0) %


 


Nucleated RBC %     


 


Neutrophils # (Manual)     (1.5-6.6)  10^3/uL


 


Lymphocytes # (Manual)     (1.5-3.5)  10^3/uL


 


Monocytes # (Manual)     (0.0-1.0)  10^3/uL


 


Eosinophils # (Manual)     (0-0.7)  10^3/uL


 


Basophils # (Manual)     (0-0.1)  10^3/uL


 


Differential Comment     


 


WBC Morphology     (NORMAL)  


 


Platelet Estimate     (NORMAL)  


 


Platelet Morphology     (NORMAL)  


 


RBC Morph Micro Appear     (NORMAL)  


 


VBG pH  7.427 H    (7.31-7.41)  


 


Ionized Calcium  1.04 L    (1.15-1.33)  mmol/L


 


Sodium    125 L  (135-145)  mmol/L


 


Potassium    < 1.5 L*  (3.5-5.0)  mmol/L


 


Chloride    93 L  (101-111)  mmol/L


 


Carbon Dioxide    19 L  (21-32)  mmol/L


 


Anion Gap    13.0  (6-13)  


 


BUN    26 H  (6-20)  mg/dL


 


Creatinine    1.9 H  (0.6-1.2)  mg/dL


 


Estimated GFR (MDRD)    41 L  (>89)  


 


Glucose    109 H  ()  mg/dL


 


POC Whole Bld Glucose   127 H   (70 - 100)  mg/dL


 


Calcium    8.0 L  (8.5-10.3)  mg/dL


 


Phosphorus     (2.5-4.6)  mg/dL


 


Magnesium     (1.7-2.8)  mg/dL


 


Total Bilirubin     (0.2-1.0)  mg/dL


 


AST     (10-42)  IU/L


 


ALT     (10-60)  IU/L


 


Alkaline Phosphatase     ()  IU/L


 


Troponin I High Sens     (2.3-19.7)  ng/L


 


Total Protein     (6.7-8.2)  g/dL


 


Albumin     (3.2-5.5)  g/dL


 


Globulin     (2.1-4.2)  g/dL


 


Albumin/Globulin Ratio     (1.0-2.2)  


 


Lipase     (22-51)  U/L


 


Urine Color     


 


Urine Clarity     (CLEAR)  


 


Urine pH     (5.0-7.5)  PH


 


Ur Specific Gravity     (1.002-1.030)  


 


Urine Protein     (NEGATIVE)  mg/dL


 


Urine Glucose (UA)     (NEGATIVE)  mg/dL


 


Urine Ketones     (NEGATIVE)  mg/dL


 


Urine Occult Blood     (NEGATIVE)  


 


Urine Nitrite     (NEGATIVE)  


 


Urine Bilirubin     (NEGATIVE)  


 


Urine Urobilinogen     (NORMAL)  E.U./dL


 


Ur Leukocyte Esterase     (NEGATIVE)  


 


Urine RBC     (0-5)  /HPF


 


Urine WBC     (0-3)  /HPF


 


Ur Squamous Epith Cells     (<= Few)  


 


Urine Bacteria     (None Seen)  /HPF


 


Urine Casts     /LPF


 


Ur Microscopic Review     


 


Urine Culture Comments     


 


Nasal Adenovirus (PCR)     


 


Nasal B. parapertussis DNA (PCR)     


 


Nasal Coronavir 229E PCR     


 


Nasal Coronavir HKU1 PCR     


 


Nasal Coronavir NL63 PCR     


 


Nasal Coronavir OC43 PCR     


 


Nasal Enterovir/Rhinovir PCR     


 


Nasal Influenza B PCR     


 


Nasal Influenza A PCR     


 


Nasal Parainfluen 1 PCR     


 


Nasal Parainfluen 2 PCR     


 


Nasal Parainfluen 3 PCR     


 


Nasal Parainfluen 4 PCR     


 


Nasal RSV (PCR)     


 


Nasal Screen MRSA (PCR)     (NEGATIVE)  


 


Nasal B.pertussis DNA PCR     


 


Nasal C.pneumoniae (PCR)     


 


Augusto Human Metapneumo PCR     


 


Nasal M.pneumoniae (PCR)     


 


Nasal SARS-CoV-2 (PCR)     














  06/08/22 06/08/22 06/08/22 Range/Units





  21:40 21:25 20:40 


 


WBC     (4.8-10.8)  x10^3/uL


 


RBC     (4.70-6.10)  10^6/uL


 


Hgb     (14.0-18.0)  g/dL


 


Hct     (42.0-52.0)  %


 


MCV     (80.0-94.0)  fL


 


MCH     (27.0-31.0)  pg


 


MCHC     (32.0-36.0)  g/dL


 


RDW     (12.0-15.0)  %


 


Plt Count     (130-450)  10^3/uL


 


MPV     (7.4-11.4)  fL


 


Neut # (Auto)     


 


Lymph # (Auto)     


 


Mono # (Auto)     


 


Eos # (Auto)     


 


Baso # (Auto)     


 


Absolute Nucleated RBC     


 


Total Counted     


 


Band Neuts % (Manual)    (0 - 10) %


 


Abnorm Lymph % (Manual)     %


 


Metamyelocytes %    ( - 0) %


 


Nucleated RBC %     


 


Neutrophils # (Manual)     (1.5-6.6)  10^3/uL


 


Lymphocytes # (Manual)     (1.5-3.5)  10^3/uL


 


Monocytes # (Manual)     (0.0-1.0)  10^3/uL


 


Eosinophils # (Manual)     (0-0.7)  10^3/uL


 


Basophils # (Manual)     (0-0.1)  10^3/uL


 


Differential Comment     


 


WBC Morphology     (NORMAL)  


 


Platelet Estimate     (NORMAL)  


 


Platelet Morphology     (NORMAL)  


 


RBC Morph Micro Appear     (NORMAL)  


 


VBG pH     (7.31-7.41)  


 


Ionized Calcium     (1.15-1.33)  mmol/L


 


Sodium     (135-145)  mmol/L


 


Potassium     (3.5-5.0)  mmol/L


 


Chloride     (101-111)  mmol/L


 


Carbon Dioxide     (21-32)  mmol/L


 


Anion Gap     (6-13)  


 


BUN     (6-20)  mg/dL


 


Creatinine     (0.6-1.2)  mg/dL


 


Estimated GFR (MDRD)     (>89)  


 


Glucose     ()  mg/dL


 


POC Whole Bld Glucose     (70 - 100)  mg/dL


 


Calcium     (8.5-10.3)  mg/dL


 


Phosphorus  2.6    (2.5-4.6)  mg/dL


 


Magnesium   2.7   (1.7-2.8)  mg/dL


 


Total Bilirubin     (0.2-1.0)  mg/dL


 


AST     (10-42)  IU/L


 


ALT     (10-60)  IU/L


 


Alkaline Phosphatase     ()  IU/L


 


Troponin I High Sens     (2.3-19.7)  ng/L


 


Total Protein     (6.7-8.2)  g/dL


 


Albumin     (3.2-5.5)  g/dL


 


Globulin     (2.1-4.2)  g/dL


 


Albumin/Globulin Ratio     (1.0-2.2)  


 


Lipase     (22-51)  U/L


 


Urine Color    YELLOW  


 


Urine Clarity    CLEAR  (CLEAR)  


 


Urine pH    7.0  (5.0-7.5)  PH


 


Ur Specific Gravity    1.010  (1.002-1.030)  


 


Urine Protein    30 H  (NEGATIVE)  mg/dL


 


Urine Glucose (UA)    NEGATIVE  (NEGATIVE)  mg/dL


 


Urine Ketones    NEGATIVE  (NEGATIVE)  mg/dL


 


Urine Occult Blood    MODERATE H  (NEGATIVE)  


 


Urine Nitrite    NEGATIVE  (NEGATIVE)  


 


Urine Bilirubin    NEGATIVE  (NEGATIVE)  


 


Urine Urobilinogen    0.2 (NORMAL)  (NORMAL)  E.U./dL


 


Ur Leukocyte Esterase    NEGATIVE  (NEGATIVE)  


 


Urine RBC    6-10 H  (0-5)  /HPF


 


Urine WBC    0-3  (0-3)  /HPF


 


Ur Squamous Epith Cells    RARE Squamous  (<= Few)  


 


Urine Bacteria    None Seen  (None Seen)  /HPF


 


Urine Casts    3-5 Course Granular  /LPF


 


Ur Microscopic Review    INDICATED  


 


Urine Culture Comments    NOT INDICATED  


 


Nasal Adenovirus (PCR)     


 


Nasal B. parapertussis DNA (PCR)     


 


Nasal Coronavir 229E PCR     


 


Nasal Coronavir HKU1 PCR     


 


Nasal Coronavir NL63 PCR     


 


Nasal Coronavir OC43 PCR     


 


Nasal Enterovir/Rhinovir PCR     


 


Nasal Influenza B PCR     


 


Nasal Influenza A PCR     


 


Nasal Parainfluen 1 PCR     


 


Nasal Parainfluen 2 PCR     


 


Nasal Parainfluen 3 PCR     


 


Nasal Parainfluen 4 PCR     


 


Nasal RSV (PCR)     


 


Nasal Screen MRSA (PCR)     (NEGATIVE)  


 


Nasal B.pertussis DNA PCR     


 


Nasal C.pneumoniae (PCR)     


 


Augusto Human Metapneumo PCR     


 


Nasal M.pneumoniae (PCR)     


 


Nasal SARS-CoV-2 (PCR)     














  06/08/22 06/08/22 06/08/22 Range/Units





  20:40 17:40 17:40 


 


WBC     (4.8-10.8)  x10^3/uL


 


RBC     (4.70-6.10)  10^6/uL


 


Hgb     (14.0-18.0)  g/dL


 


Hct     (42.0-52.0)  %


 


MCV     (80.0-94.0)  fL


 


MCH     (27.0-31.0)  pg


 


MCHC     (32.0-36.0)  g/dL


 


RDW     (12.0-15.0)  %


 


Plt Count     (130-450)  10^3/uL


 


MPV     (7.4-11.4)  fL


 


Neut # (Auto)     


 


Lymph # (Auto)     


 


Mono # (Auto)     


 


Eos # (Auto)     


 


Baso # (Auto)     


 


Absolute Nucleated RBC     


 


Total Counted     


 


Band Neuts % (Manual)    (0 - 10) %


 


Abnorm Lymph % (Manual)     %


 


Metamyelocytes %    ( - 0) %


 


Nucleated RBC %     


 


Neutrophils # (Manual)     (1.5-6.6)  10^3/uL


 


Lymphocytes # (Manual)     (1.5-3.5)  10^3/uL


 


Monocytes # (Manual)     (0.0-1.0)  10^3/uL


 


Eosinophils # (Manual)     (0-0.7)  10^3/uL


 


Basophils # (Manual)     (0-0.1)  10^3/uL


 


Differential Comment     


 


WBC Morphology     (NORMAL)  


 


Platelet Estimate     (NORMAL)  


 


Platelet Morphology     (NORMAL)  


 


RBC Morph Micro Appear     (NORMAL)  


 


VBG pH     (7.31-7.41)  


 


Ionized Calcium     (1.15-1.33)  mmol/L


 


Sodium     (135-145)  mmol/L


 


Potassium     (3.5-5.0)  mmol/L


 


Chloride     (101-111)  mmol/L


 


Carbon Dioxide     (21-32)  mmol/L


 


Anion Gap     (6-13)  


 


BUN     (6-20)  mg/dL


 


Creatinine     (0.6-1.2)  mg/dL


 


Estimated GFR (MDRD)     (>89)  


 


Glucose     ()  mg/dL


 


POC Whole Bld Glucose     (70 - 100)  mg/dL


 


Calcium     (8.5-10.3)  mg/dL


 


Phosphorus     (2.5-4.6)  mg/dL


 


Magnesium     (1.7-2.8)  mg/dL


 


Total Bilirubin     (0.2-1.0)  mg/dL


 


AST     (10-42)  IU/L


 


ALT     (10-60)  IU/L


 


Alkaline Phosphatase     ()  IU/L


 


Troponin I High Sens    171.2 H*  (2.3-19.7)  ng/L


 


Total Protein     (6.7-8.2)  g/dL


 


Albumin     (3.2-5.5)  g/dL


 


Globulin     (2.1-4.2)  g/dL


 


Albumin/Globulin Ratio     (1.0-2.2)  


 


Lipase     (22-51)  U/L


 


Urine Color     


 


Urine Clarity     (CLEAR)  


 


Urine pH     (5.0-7.5)  PH


 


Ur Specific Gravity     (1.002-1.030)  


 


Urine Protein     (NEGATIVE)  mg/dL


 


Urine Glucose (UA)     (NEGATIVE)  mg/dL


 


Urine Ketones     (NEGATIVE)  mg/dL


 


Urine Occult Blood     (NEGATIVE)  


 


Urine Nitrite     (NEGATIVE)  


 


Urine Bilirubin     (NEGATIVE)  


 


Urine Urobilinogen     (NORMAL)  E.U./dL


 


Ur Leukocyte Esterase     (NEGATIVE)  


 


Urine RBC     (0-5)  /HPF


 


Urine WBC     (0-3)  /HPF


 


Ur Squamous Epith Cells     (<= Few)  


 


Urine Bacteria     (None Seen)  /HPF


 


Urine Casts     /LPF


 


Ur Microscopic Review     


 


Urine Culture Comments     


 


Nasal Adenovirus (PCR)   NOT DETECTED   


 


Nasal B. parapertussis DNA (PCR)   NOT DETECTED   


 


Nasal Coronavir 229E PCR   NOT DETECTED   


 


Nasal Coronavir HKU1 PCR   NOT DETECTED   


 


Nasal Coronavir NL63 PCR   NOT DETECTED   


 


Nasal Coronavir OC43 PCR   NOT DETECTED   


 


Nasal Enterovir/Rhinovir PCR   NOT DETECTED   


 


Nasal Influenza B PCR   NOT DETECTED   


 


Nasal Influenza A PCR   NOT DETECTED   


 


Nasal Parainfluen 1 PCR   NOT DETECTED   


 


Nasal Parainfluen 2 PCR   NOT DETECTED   


 


Nasal Parainfluen 3 PCR   NOT DETECTED   


 


Nasal Parainfluen 4 PCR   NOT DETECTED   


 


Nasal RSV (PCR)   NOT DETECTED   


 


Nasal Screen MRSA (PCR)  NEGATIVE    (NEGATIVE)  


 


Nasal B.pertussis DNA PCR   NOT DETECTED   


 


Nasal C.pneumoniae (PCR)   NOT DETECTED   


 


Augusto Human Metapneumo PCR   NOT DETECTED   


 


Nasal M.pneumoniae (PCR)   NOT DETECTED   


 


Nasal SARS-CoV-2 (PCR)   NOT DETECTED   














  06/08/22 06/08/22 06/08/22 Range/Units





  16:47 16:47 16:47 


 


WBC    30.3 H  (4.8-10.8)  x10^3/uL


 


RBC    6.62 H  (4.70-6.10)  10^6/uL


 


Hgb    19.2 H  (14.0-18.0)  g/dL


 


Hct    51.2  (42.0-52.0)  %


 


MCV    77.3 L  (80.0-94.0)  fL


 


MCH    29.0  (27.0-31.0)  pg


 


MCHC    37.5 H  (32.0-36.0)  g/dL


 


RDW    13.8  (12.0-15.0)  %


 


Plt Count    463 H  (130-450)  10^3/uL


 


MPV    8.2  (7.4-11.4)  fL


 


Neut # (Auto)    Not Reportable  


 


Lymph # (Auto)    Not Reportable  


 


Mono # (Auto)    Not Reportable  


 


Eos # (Auto)    Not Reportable  


 


Baso # (Auto)    Not Reportable  


 


Absolute Nucleated RBC    Not Reportable  


 


Total Counted    100  


 


Band Neuts % (Manual)    3 (0 - 10) %


 


Abnorm Lymph % (Manual)    0  %


 


Metamyelocytes %    1 H ( - 0) %


 


Nucleated RBC %    Not Reportable  


 


Neutrophils # (Manual)    27.9 H  (1.5-6.6)  10^3/uL


 


Lymphocytes # (Manual)    1.8  (1.5-3.5)  10^3/uL


 


Monocytes # (Manual)    0.3  (0.0-1.0)  10^3/uL


 


Eosinophils # (Manual)    0.0  (0-0.7)  10^3/uL


 


Basophils # (Manual)    0.0  (0-0.1)  10^3/uL


 


Differential Comment    MANUAL DIFFERENTIAL  


 


WBC Morphology    NORMAL APPEARANCE  (NORMAL)  


 


Platelet Estimate    INCREASED (>450,000)  (NORMAL)  


 


Platelet Morphology    NORMAL APPEARANCE  (NORMAL)  


 


RBC Morph Micro Appear    NORMAL APPEARANCE  (NORMAL)  


 


VBG pH     (7.31-7.41)  


 


Ionized Calcium     (1.15-1.33)  mmol/L


 


Sodium   126 L   (135-145)  mmol/L


 


Potassium   < 1.5 L*   (3.5-5.0)  mmol/L


 


Chloride   90 L   (101-111)  mmol/L


 


Carbon Dioxide   21   (21-32)  mmol/L


 


Anion Gap   15.0 H   (6-13)  


 


BUN   28 H   (6-20)  mg/dL


 


Creatinine   2.0 H   (0.6-1.2)  mg/dL


 


Estimated GFR (MDRD)   38 L   (>89)  


 


Glucose   116 H   ()  mg/dL


 


POC Whole Bld Glucose     (70 - 100)  mg/dL


 


Calcium   8.3 L   (8.5-10.3)  mg/dL


 


Phosphorus   1.2 L   (2.5-4.6)  mg/dL


 


Magnesium   2.8   (1.7-2.8)  mg/dL


 


Total Bilirubin   0.7   (0.2-1.0)  mg/dL


 


AST   29   (10-42)  IU/L


 


ALT   27   (10-60)  IU/L


 


Alkaline Phosphatase   88   ()  IU/L


 


Troponin I High Sens  177.4 H*    (2.3-19.7)  ng/L


 


Total Protein   8.1   (6.7-8.2)  g/dL


 


Albumin   3.3   (3.2-5.5)  g/dL


 


Globulin   4.8 H   (2.1-4.2)  g/dL


 


Albumin/Globulin Ratio   0.7 L   (1.0-2.2)  


 


Lipase   88 H   (22-51)  U/L


 


Urine Color     


 


Urine Clarity     (CLEAR)  


 


Urine pH     (5.0-7.5)  PH


 


Ur Specific Gravity     (1.002-1.030)  


 


Urine Protein     (NEGATIVE)  mg/dL


 


Urine Glucose (UA)     (NEGATIVE)  mg/dL


 


Urine Ketones     (NEGATIVE)  mg/dL


 


Urine Occult Blood     (NEGATIVE)  


 


Urine Nitrite     (NEGATIVE)  


 


Urine Bilirubin     (NEGATIVE)  


 


Urine Urobilinogen     (NORMAL)  E.U./dL


 


Ur Leukocyte Esterase     (NEGATIVE)  


 


Urine RBC     (0-5)  /HPF


 


Urine WBC     (0-3)  /HPF


 


Ur Squamous Epith Cells     (<= Few)  


 


Urine Bacteria     (None Seen)  /HPF


 


Urine Casts     /LPF


 


Ur Microscopic Review     


 


Urine Culture Comments     


 


Nasal Adenovirus (PCR)     


 


Nasal B. parapertussis DNA (PCR)     


 


Nasal Coronavir 229E PCR     


 


Nasal Coronavir HKU1 PCR     


 


Nasal Coronavir NL63 PCR     


 


Nasal Coronavir OC43 PCR     


 


Nasal Enterovir/Rhinovir PCR     


 


Nasal Influenza B PCR     


 


Nasal Influenza A PCR     


 


Nasal Parainfluen 1 PCR     


 


Nasal Parainfluen 2 PCR     


 


Nasal Parainfluen 3 PCR     


 


Nasal Parainfluen 4 PCR     


 


Nasal RSV (PCR)     


 


Nasal Screen MRSA (PCR)     (NEGATIVE)  


 


Nasal B.pertussis DNA PCR     


 


Nasal C.pneumoniae (PCR)     


 


Augusto Human Metapneumo PCR     


 


Nasal M.pneumoniae (PCR)     


 


Nasal SARS-CoV-2 (PCR)     














Assessment/Plan





- Problem List


(1) Hypokalemia


Impression: 


Secondary to noncompliance with his home medications.  His potassium remains 

significantly decreased at less than 1.5 this morning despite multiple potassium

riders. His magnesium is within normal limits. We will give another 8 potassium 

riders and start him on 40 meq of potassium 3 times daily.  We will hold his 

home spironolactone lisinopril given the acute kidney injury but these will need

to be resumed at some point.  We will continue to keep him in the ICU as he is 

at risk for arrhythmias.  If necessary, we we will consider a central line for 

higher dose potassium supplementation.  Continue to monitor on telemetry.  Check

labs every 6 hours.








(2) Gitelman syndrome


Impression: 


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 

hypokalemia is due to medication noncompliance.  We are managing his hypokalemia

as mentioned above.  Eventually to resume his home potassium dose as well as the

spironolactone and lisinopril.  Continue outpatient follow-up with nephrology.








(3) Elevated troponin


Impression: 


This is likely demand ischemia secondary to acute kidney injury and the 

hypokalemia.  His EKG on admission was abnormal with ST depression in lateral 

leads but this is likely due to the hypokalemia rather than ischemia.  He has no

chest pain and his troponins are flat.  We will continue to monitor on telemetr

y.








(4) Acute kidney injury


Impression: 


Creatinine is elevated at 2.0 on admission his baseline is 1.1.  This likely 

prerenal injury due to volume depletion.  His creatinine this morning has 

improved to 1.8.  We will continue IV hydration with normal saline.  We will 

give him another liter of normal saline.  Hold home spironolactone and 

lisinopril until the acute kidney injury resolves.  Monitor renal function and 

urine output.








(5) Hyponatremia


Impression: 


This is hypovolemic hyponatremia.  His sodium has improved to 130 this morning 

after IV hydration.  We will continue normal saline with potassium.  Continue to

monitor his sodium.








(6) Leukocytosis


Impression: 


We suspect this is likely reactive and predominantly due to hemoconcentration.  

All cell counts have decreased after IV fluids.  He still appears hypovolemic so

we will continue IV hydration.  Chest x-ray suggestive of potential pneumonia 

but clinically he has no evidence of infection.  We will hold off on 

antibiotics.

## 2022-06-09 NOTE — DISCHARGE SUMMARY
Discharge Summary


Admit Date: 06/08/22


Discharge Date: 06/09/22


Discharging Provider: Vernon Tyler


Code Status: Attempt Resuscitation


Condition at Discharge: Critical


Discharge Disposition: 07 Against Medical Advice





- DIAGNOSES


Admission Diagnoses: 


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


Noncompliance to medical regimen


Leukocytosis


Dehydration


Discharge Diagnoses with Status of Each Condition: 


Hypokalemia - ongoing.


Gitelman syndrome - ongoing.


Elevated troponin - stable.


Acute kidney injury - improved.


Hyponatremia - improved.


Leukocytosis - improved.





- HPI


History of Present Illness: 


H&P per Dr. Fenton:





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  He is well-known to our service.  He is followed

by Dr. Macdonald at [facility], nephrology.  He states that he does 

have a primary care provider but he has not seen her in years.  About 2 months 

ago he stopped taking his medications.  Cannot really state why.  There is no p

hilosophical decision here.  He just "says I just stopped taking them".  In 

addition he has developed nausea over the last 2 to 3 weeks and its getting 

worse.  Keeping anything down has been problematic.  He lives with his mother 

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  His last admission to our service was February 12, 2021 and he was discharged on February 15, 2021.





His current symptomatology is that of nausea, severe weakness.  It is hard for 

him to even sit up or use his arms to dress himself.  He denies fever, chills.  

He denies cough, shortness of breath.  There is no diarrhea.  The emergency room

that he was having diarrhea but he is telling me that he is not.  I asked him 

about that and he says he does not remember. There is occasional emesis.  There 

is no vomiting of blood.  There is no change in the color of his stool.  He 

denies urgency, frequency.





He is described as weak, fatigued, frequent PACs on telemetry.  Potassium was 

1.4.  Creatinine is 2.  White cell count is over 30,000.  Chest x-ray shows a 

possible right lower lung infiltrate versus atelectasis.  In the emergency room 

he is already started receiving potassium riders.  We are now being asked to 

admit him to our service.





- HOSPITAL COURSE


Hospital Course: 


He was admitted to intensive care unit due to severe hypokalemia secondary to 

gitelman syndrome.  He was given multiple potassium riders as well as oral 

potassium without any improvement in his potassium.  It remained at less than 

1.5.  His magnesium was within normal limits.  His hyponatremia improved with IV

fluids and his creatinine did improve to 1.8 from 2.0 on admit.  He was quite 

volume depleted on admission and all his cell counts were significantly elevated

which suggested the dehydration.  His white blood cell count did improved to 

18,000 from 30,000.  His chest x-ray was read as possible infiltrate but cl

inically he had no evidence of pneumonia so he was not treated with antibiotics.

 Her troponin was elevated in 170s but this was flat.  His EKG did suggest 

possible ischemia but is likely due to the hypokalemia rather than underlying 

heart disease.  He was chest pain-free throughout this hospitalization.  The 

following day after admission, the patient requested to leave AGAINST MEDICAL 

ADVICE as he felt much better.  He stated his plan was only to get a few 

potassium riders to start feeling better before going home and that he did not 

think he needed to be admitted.  I discussed with him that his potassium is 

still critically low at less than 1.5 and that he is a danger of arrhythmias and

potential death.  He states he understands that he has been dealing with this 

for most of his life and he knows the risks and benefits and prefers to go home.

 He was able to express to me that he can potentially die from this and he 

understands.  He denies any suicidal thoughts or ideations.  He just wants to go

home to take care of finances and to fix the water situation at his home.  He 

did ask for a prescription for muscle relaxant or Ativan on discharge but I 

informed him that I cannot prescribe him these medications especially with the 

fact that he is leaving AGAINST MEDICAL ADVICE.  I did ask him if he had 

potassium at home and he stated he only had a few days left so did provide him 

with 30 days of potassium supplementation.  He was given a prescription for 80 

mEq of potassium Klor-Con 4 times daily which is his home dose.  He states he 

will follow-up with his primary care physician for future refills.  I instructed

him to return to the emergency department if he develops any evidence of 

syncope, chest pain, palpitations, muscle aches/pain.  He expressed 

understanding.





- ALLERGIES


Allergies/Adverse Reactions: 


                                    Allergies











Allergy/AdvReac Type Severity Reaction Status Date / Time


 


No Known Drug Allergies Allergy   Verified 10/16/19 15:07














- MEDICATIONS


Home Medications: 


                                Ambulatory Orders











 Medication  Instructions  Recorded  Confirmed


 


Potassium Chloride [Klor-Con] 80 meq PO QID 30 Days #480 ea 06/09/22 


 


lisinopriL [Zestril] 5 mg PO DAILY 06/09/22 06/09/22














- PHYSICAL EXAM AT DISCHARGE


General Appearance: positive: No acute distress, Alert


Eyes Bilateral: positive: Normal inspection, Conjunctivae nml


ENT: positive: Dry mucous membranes.  negative: No signs of dehydration


Neck: positive: Nml inspection


Respiratory: positive: No respiratory distress.  negative: Wheezes, Rales


Cardiovascular: positive: Regular rate & rhythm, No murmur.  negative: 

Tachycardia


Abdomen: positive: Non-tender, No distention.  negative: Tenderness


Skin: positive: Warm, Dry


Extremities: positive: No pedal edema


Neurologic/Psychiatric: positive: Motor nml.  negative: Disoriented to person, 

Disoriented to place


Physical Exam Other/Comments: 








                               Vital Signs - 24 hr











  06/08/22 06/08/22 06/08/22





  21:00 22:00 23:00


 


Temperature   


 


Heart Rate [ 77 92 91





Monitoring   





electrodes]   


 


Respiratory 24 22 27 H





Rate   


 


Blood Pressure 111/69 98/81 H 109/72





[Right Brachial   





artery]   


 


O2 Saturation 96 98 95














  06/09/22 06/09/22 06/09/22





  00:00 01:00 02:00


 


Temperature 36.7 C  


 


Heart Rate [ 88 76 80





Monitoring   





electrodes]   


 


Respiratory 26 H 27 H 25 H





Rate   


 


Blood Pressure 113/61 115/64 90/59 L





[Right Brachial   





artery]   


 


O2 Saturation 91 L 96 93














  06/09/22 06/09/22 06/09/22





  03:00 04:00 05:00


 


Temperature   


 


Heart Rate [ 81 83 81





Monitoring   





electrodes]   


 


Respiratory 24 22 24





Rate   


 


Blood Pressure 93/62 113/56 L 94/68





[Right Brachial   





artery]   


 


O2 Saturation 92 96 94














  06/09/22 06/09/22 06/09/22





  06:00 07:00 08:00


 


Temperature   36.4 C L


 


Heart Rate [ 82 88 85





Monitoring   





electrodes]   


 


Respiratory 21 15 22





Rate   


 


Blood Pressure 108/71 108/71 131/76 H





[Right Brachial   





artery]   


 


O2 Saturation 96 96 97














  06/09/22 06/09/22 06/09/22





  09:00 10:00 11:00


 


Temperature   


 


Heart Rate [ 99 90 80





Monitoring   





electrodes]   


 


Respiratory 22 20 18





Rate   


 


Blood Pressure 112/70 115/78 94/65





[Right Brachial   





artery]   


 


O2 Saturation 97 98 97














  06/09/22 06/09/22 06/09/22





  12:00 13:00 14:00


 


Temperature 36.6 C  


 


Heart Rate [ 92 99 94





Monitoring   





electrodes]   


 


Respiratory 20 20 18





Rate   


 


Blood Pressure 122/82 H 93/68 119/77





[Right Brachial   





artery]   


 


O2 Saturation 99 99 97














  06/09/22





  14:59


 


Temperature 


 


Heart Rate [ 100





Monitoring 





electrodes] 


 


Respiratory 20





Rate 


 


Blood Pressure 109/78





[Right Brachial 





artery] 


 


O2 Saturation 98








                                     Oxygen











O2 Source                      Room air

















- LABS


Result Diagrams: 


                                 06/09/22 07:01





                                 06/09/22 13:12





- DIAGNOSTIC IMAGING


Diagnostic Imaging Results: Final report reviewed





- FOLLOW UP


Follow Up: 


He was instructed to follow-up with primary care physician as soon as possible 

and to return to the emergency department if he were develop any evidence of 

syncope, chest pain, palpitations.





- TIME SPENT


Time Spent in Discharge (Minutes): 31

## 2022-06-09 NOTE — PHARMACY PROGRESS NOTE
- Best Possible Medication History


Admit Date and Time: 06/08/22 1900


Processed by: Pharmacy


Medication History completed: Yes


Secondary Source(s): Insurance records


Patient reports he has not been taking any medications for several weeks. Med 

list updated with most recent fill history for reference.





As the person ultimately responsible for medication therapy, providers are able 

to order a medication from an existing home medication list in [facility] via the 

"Reconcile Routine" prior to Confirmation of that medication by support staff. 

Such practice is discouraged except when the physician, in their clinical 

judgment, deems that a medical need exists for a medication without regard to 

previous use.
Well Baby

## 2022-06-10 NOTE — XRAY REPORT
PROCEDURE: CHEST 1 View

 

INDICATIONS: Chest pain

 

TECHNIQUE: Single frontal view of the chest was obtained

 

COMPARISON: None.

 

FINDINGS: 

Left basilar atelectasis and or infiltrate present. Heart size, pulmonary vasculature normal. Right l
sue and pleural space clear.

 

IMPRESSION: 

Left basilar atelectasis and or infiltrate

 

Reviewed by: Jean-Paul Lyons MD on 6/8/2022 5:24 PM AKDT

Approved by: Jean-Paul Lyons MD on 6/8/2022 5:24 PM AKDT

 

 

Station ID:  [id number]

## 2022-10-10 ENCOUNTER — HOSPITAL ENCOUNTER (INPATIENT)
Dept: HOSPITAL 76 - ED | Age: 32
LOS: 4 days | Discharge: LEFT BEFORE DISCHARGE | DRG: 640 | End: 2022-10-14
Attending: SPECIALIST | Admitting: INTERNAL MEDICINE
Payer: MEDICAID

## 2022-10-10 ENCOUNTER — HOSPITAL ENCOUNTER (OUTPATIENT)
Dept: HOSPITAL 76 - EMS | Age: 32
Discharge: ANOTHER HOSPITAL | End: 2022-10-10
Payer: MEDICAID

## 2022-10-10 DIAGNOSIS — Z84.1: ICD-10-CM

## 2022-10-10 DIAGNOSIS — Z91.199: ICD-10-CM

## 2022-10-10 DIAGNOSIS — R53.1: Primary | ICD-10-CM

## 2022-10-10 DIAGNOSIS — R19.7: ICD-10-CM

## 2022-10-10 DIAGNOSIS — L73.1: ICD-10-CM

## 2022-10-10 DIAGNOSIS — F41.9: ICD-10-CM

## 2022-10-10 DIAGNOSIS — R53.81: ICD-10-CM

## 2022-10-10 DIAGNOSIS — N17.9: ICD-10-CM

## 2022-10-10 DIAGNOSIS — U07.1: ICD-10-CM

## 2022-10-10 DIAGNOSIS — E87.6: Primary | ICD-10-CM

## 2022-10-10 DIAGNOSIS — N25.89: ICD-10-CM

## 2022-10-10 DIAGNOSIS — D72.829: ICD-10-CM

## 2022-10-10 DIAGNOSIS — F17.200: ICD-10-CM

## 2022-10-10 DIAGNOSIS — F32.A: ICD-10-CM

## 2022-10-10 DIAGNOSIS — Z87.898: ICD-10-CM

## 2022-10-10 DIAGNOSIS — F06.8: ICD-10-CM

## 2022-10-10 DIAGNOSIS — E87.1: ICD-10-CM

## 2022-10-10 LAB
ALBUMIN DIAFP-MCNC: 3 G/DL (ref 3.2–5.5)
ALBUMIN/GLOB SERPL: 0.6 {RATIO} (ref 1–2.2)
ALP SERPL-CCNC: 173 IU/L (ref 42–121)
ALT SERPL W P-5'-P-CCNC: 19 IU/L (ref 10–60)
ANION GAP SERPL CALCULATED.4IONS-SCNC: 13 MMOL/L (ref 6–13)
ANION GAP SERPL CALCULATED.4IONS-SCNC: 14 MMOL/L (ref 6–13)
ANION GAP SERPL CALCULATED.4IONS-SCNC: 14 MMOL/L (ref 6–13)
AST SERPL W P-5'-P-CCNC: 18 IU/L (ref 10–42)
B PARAPERT DNA SPEC QL NAA+PROBE: NOT DETECTED
B PERT DNA SPEC QL NAA+PROBE: NOT DETECTED
BASOPHILS # BLD MANUAL: 0 10^3/UL (ref 0–0.1)
BASOPHILS NFR BLD AUTO: 0.1 %
BILIRUB BLD-MCNC: 0.5 MG/DL (ref 0.2–1)
BUN SERPL-MCNC: 33 MG/DL (ref 6–20)
BUN SERPL-MCNC: 35 MG/DL (ref 6–20)
BUN SERPL-MCNC: 38 MG/DL (ref 6–20)
C PNEUM DNA NPH QL NAA+NON-PROBE: NOT DETECTED
CALCIUM UR-MCNC: 6.7 MG/DL (ref 8.5–10.3)
CALCIUM UR-MCNC: 7.3 MG/DL (ref 8.5–10.3)
CALCIUM UR-MCNC: 7.7 MG/DL (ref 8.5–10.3)
CHLORIDE SERPL-SCNC: 82 MMOL/L (ref 101–111)
CHLORIDE SERPL-SCNC: 86 MMOL/L (ref 101–111)
CHLORIDE SERPL-SCNC: 89 MMOL/L (ref 101–111)
CO2 SERPL-SCNC: 17 MMOL/L (ref 21–32)
CO2 SERPL-SCNC: 18 MMOL/L (ref 21–32)
CO2 SERPL-SCNC: 20 MMOL/L (ref 21–32)
CREAT SERPLBLD-SCNC: 2.3 MG/DL (ref 0.6–1.2)
CREAT SERPLBLD-SCNC: 2.4 MG/DL (ref 0.6–1.2)
CREAT SERPLBLD-SCNC: 2.5 MG/DL (ref 0.6–1.2)
EOSINOPHIL # BLD MANUAL: 0 10^3/UL (ref 0–0.7)
EOSINOPHIL NFR BLD AUTO: 0.1 %
ERYTHROCYTE [DISTWIDTH] IN BLOOD BY AUTOMATED COUNT: 13.2 % (ref 12–15)
FLUAV RNA RESP QL NAA+PROBE: NOT DETECTED
GFRSERPLBLD MDRD-ARVRAT: 30 ML/MIN/{1.73_M2} (ref 89–?)
GFRSERPLBLD MDRD-ARVRAT: 31 ML/MIN/{1.73_M2} (ref 89–?)
GFRSERPLBLD MDRD-ARVRAT: 33 ML/MIN/{1.73_M2} (ref 89–?)
GLOBULIN SER-MCNC: 4.9 G/DL (ref 2.1–4.2)
GLUCOSE SERPL-MCNC: 100 MG/DL (ref 70–100)
GLUCOSE SERPL-MCNC: 119 MG/DL (ref 70–100)
GLUCOSE SERPL-MCNC: 132 MG/DL (ref 70–100)
HAEM INFLU B DNA SPEC QL NAA+PROBE: NOT DETECTED
HCOV 229E RNA SPEC QL NAA+PROBE: NOT DETECTED
HCOV HKU1 RNA UPPER RESP QL NAA+PROBE: NOT DETECTED
HCOV NL63 RNA ASPIRATE QL NAA+PROBE: NOT DETECTED
HCOV OC43 RNA SPEC QL NAA+PROBE: NOT DETECTED
HCT VFR BLD AUTO: 49 % (ref 42–52)
HGB UR QL STRIP: 18.9 G/DL (ref 14–18)
HMPV AG SPEC QL: NOT DETECTED
HPIV1 RNA NPH QL NAA+PROBE: NOT DETECTED
HPIV2 SPEC QL CULT: NOT DETECTED
HPIV3 AB TITR SER CF: NOT DETECTED {TITER}
HPIV4 RNA SPEC QL NAA+PROBE: NOT DETECTED
LYMPH ABN NFR BLD MANUAL: 0 %
LYMPHOBLASTS # BLD: 4 %
LYMPHOCYTES # BLD MANUAL: 2.9 10^3/UL (ref 1.5–3.5)
LYMPHOCYTES NFR BLD AUTO: 3.1 %
M PNEUMO DNA SPEC QL NAA+PROBE: NOT DETECTED
MAGNESIUM SERPL-MCNC: 2 MG/DL (ref 1.7–2.8)
MANUAL DIF COMMENT BLD-IMP: (no result)
MCH RBC QN AUTO: 30.1 PG (ref 27–31)
MCHC RBC AUTO-ENTMCNC: 38.6 G/DL (ref 32–36)
MCV RBC AUTO: 78 FL (ref 80–94)
MONOCYTES # BLD MANUAL: 0.5 10^3/UL (ref 0–1)
MONOCYTES NFR BLD AUTO: 4 %
NEUTROPHILS # SNV AUTO: 48.1 X10^3/UL (ref 4.8–10.8)
NEUTROPHILS NFR BLD AUTO: 89.7 %
NEUTROPHILS NFR BLD MANUAL: 44.7 10^3/UL (ref 1.5–6.6)
NEUTS BAND NFR BLD: 13 %
PDW BLD AUTO: 8.6 FL (ref 7.4–11.4)
PHOSPHATE BLD-MCNC: 3 MG/DL (ref 2.5–4.6)
PLATELET # BLD: 492 10^3/UL (ref 130–450)
POTASSIUM SERPL-SCNC: < 1.5 MMOL/L (ref 3.5–5)
PROT SPEC-MCNC: 7.9 G/DL (ref 6.7–8.2)
RBC MAR: 6.28 10^6/UL (ref 4.7–6.1)
RSV RNA RESP QL NAA+PROBE: NOT DETECTED
RV+EV RNA SPEC QL NAA+PROBE: NOT DETECTED
SARS-COV-2 RNA PNL SPEC NAA+PROBE: DETECTED
SODIUM SERPLBLD-SCNC: 116 MMOL/L (ref 135–145)
SODIUM SERPLBLD-SCNC: 118 MMOL/L (ref 135–145)
SODIUM SERPLBLD-SCNC: 119 MMOL/L (ref 135–145)
VARIANT LYMPHS NFR BLD MANUAL: 2 %

## 2022-10-10 PROCEDURE — 80306 DRUG TEST PRSMV INSTRMNT: CPT

## 2022-10-10 PROCEDURE — 99285 EMERGENCY DEPT VISIT HI MDM: CPT

## 2022-10-10 PROCEDURE — 99283 EMERGENCY DEPT VISIT LOW MDM: CPT

## 2022-10-10 PROCEDURE — 93005 ELECTROCARDIOGRAM TRACING: CPT

## 2022-10-10 PROCEDURE — 36415 COLL VENOUS BLD VENIPUNCTURE: CPT

## 2022-10-10 PROCEDURE — 96365 THER/PROPH/DIAG IV INF INIT: CPT

## 2022-10-10 PROCEDURE — 80053 COMPREHEN METABOLIC PANEL: CPT

## 2022-10-10 PROCEDURE — 84100 ASSAY OF PHOSPHORUS: CPT

## 2022-10-10 PROCEDURE — 83735 ASSAY OF MAGNESIUM: CPT

## 2022-10-10 PROCEDURE — 80048 BASIC METABOLIC PNL TOTAL CA: CPT

## 2022-10-10 PROCEDURE — 87150 DNA/RNA AMPLIFIED PROBE: CPT

## 2022-10-10 PROCEDURE — 71045 X-RAY EXAM CHEST 1 VIEW: CPT

## 2022-10-10 PROCEDURE — 85025 COMPLETE CBC W/AUTO DIFF WBC: CPT

## 2022-10-10 PROCEDURE — 87633 RESP VIRUS 12-25 TARGETS: CPT

## 2022-10-10 RX ADMIN — SODIUM CHLORIDE SCH MLS/HR: 9 INJECTION, SOLUTION INTRAVENOUS at 18:32

## 2022-10-10 RX ADMIN — SODIUM CHLORIDE, PRESERVATIVE FREE SCH ML: 5 INJECTION INTRAVENOUS at 17:51

## 2022-10-10 RX ADMIN — POTASSIUM CHLORIDE SCH MLS/HR: 7.46 INJECTION, SOLUTION INTRAVENOUS at 22:10

## 2022-10-10 RX ADMIN — POTASSIUM CHLORIDE SCH MEQ: 20 SOLUTION ORAL at 22:10

## 2022-10-10 RX ADMIN — POTASSIUM CHLORIDE SCH MLS/HR: 7.46 INJECTION, SOLUTION INTRAVENOUS at 18:20

## 2022-10-10 RX ADMIN — POTASSIUM CHLORIDE SCH MLS/HR: 7.46 INJECTION, SOLUTION INTRAVENOUS at 20:56

## 2022-10-10 RX ADMIN — POTASSIUM CHLORIDE SCH MLS/HR: 7.46 INJECTION, SOLUTION INTRAVENOUS at 19:33

## 2022-10-10 RX ADMIN — POTASSIUM CHLORIDE SCH MLS/HR: 7.46 INJECTION, SOLUTION INTRAVENOUS at 23:17

## 2022-10-10 NOTE — ED PHYSICIAN DOCUMENTATION
History of Present Illness





- Stated complaint


Stated Complaint: GEN WEAKNESS





- Chief complaint


Chief Complaint: General





- History obtained from


History obtained from: Patient





- Additonal information


Additional information: 


The patient comes to the emergency department chief complaint of generalized 

weakness.  He has a history of hypokalemia and feels as though his potassium may

be low again.  He denies fevers or chills.  No feeling of distinct illness.  No 

shortness of breath, cough, or chest pain.  No abdominal pain or vomiting.  No 

other complaints at this time.








Review of Systems


Ten Systems: 10 systems reviewed and negative


Constitutional: reports: Reviewed and negative


Eyes: reports: Reviewed and negative


Ears: reports: Reviewed and negative


Nose: reports: Reviewed and negative


Throat: reports: Reviewed and negative


Cardiac: reports: Reviewed and negative


Respiratory: reports: Reviewed and negative


GI: reports: Reviewed and negative


: reports: Reviewed and negative


Skin: reports: Reviewed and negative


Musculoskeletal: reports: Reviewed and negative


Neurologic: reports: Generalized weakness


Psychiatric: reports: Reviewed and negative


Endocrine: reports: Reviewed and negative


Immunocompromised: reports: Reviewed and negative





PD PAST MEDICAL HISTORY





- Past Medical History


Past Medical History: Yes


Cardiovascular: None


Respiratory: None


Neuro: Other


Endocrine/Autoimmune: None


GI: None


GYN: Other (Renal tubular acidosis)


: None, Other


HEENT: None


Psych: Depression, Anxiety


Musculoskeletal: None


Derm: None, Other





- Past Surgical History


Past Surgical History: Yes





- Present Medications


Home Medications: 


                                Ambulatory Orders











 Medication  Instructions  Recorded  Confirmed


 


Potassium Chloride [Klor-Con] 80 meq PO QID 30 Days #480 ea 06/09/22 10/10/22


 


Spironolactone [Aldactone] 12.5 mg PO BID 10/10/22 10/10/22


 


ARIPiprazole [Abilify] 5 mg PO DAILY #30 tablet 10/14/22 














- Allergies


Allergies/Adverse Reactions: 


                                    Allergies











Allergy/AdvReac Type Severity Reaction Status Date / Time


 


No Known Drug Allergies Allergy   Verified 10/10/22 11:14














- Social History


Does the pt smoke?: Yes


Smoking Status: Current every day smoker


Does the pt drink ETOH?: No


Does the pt have substance abuse?: Yes





- Immunizations


Immunizations are current?: Yes





- POLST


Patient has POLST: No


POLST Status: Full Code





PD ED PE NORMAL





- Vitals


Vital signs reviewed: Yes





- General


General: Alert and oriented X 3, No acute distress, Well developed/nourished





- HEENT


HEENT: Atraumatic, PERRL, EOMI, Moist mucous membranes





- Neck


Neck: Supple, no meningeal sign





- Cardiac


Cardiac: RRR, No murmur, Strong equal pulses





- Respiratory


Respiratory: No respiratory distress, Clear bilaterally





- Abdomen


Abdomen: Soft, Non tender, Non distended





- Derm


Derm: Normal color, Warm and dry, No rash





- Extremities


Extremities: No deformity, No edema





- Neuro


Neuro: Alert and oriented X 3, CNs 2-12 intact, Normal speech





- Psych


Psych: Normal mood, Normal affect





Results





- Vitals


Vitals: 


                                     Oxygen











O2 Source                      Room air

















- EKG (time done)


  ** 1330


Rate: Rate (enter#) (71)


Rhythm: NSR


Axis: Normal


Intervals: Prolonged HI, Prolonged QT


QRS: Normal


Ischemia: Non specific changes


Compare to prior EKG: Old EKG unavailable


Computer interpretation: Agree with computer





- Labs


Labs: 


                                Laboratory Tests











  10/10/22 10/10/22 10/10/22





  12:07 12:07 13:15


 


WBC  48.1 H*  


 


RBC  6.28 H  


 


Hgb  18.9 H  


 


Hct  49.0  


 


MCV  78.0 L  


 


MCH  30.1  


 


MCHC  38.6 H  


 


RDW  13.2  


 


Plt Count  492 H  


 


MPV  8.6  


 


Neut # (Auto)  Not Reportable  


 


Lymph # (Auto)  Not Reportable  


 


Mono # (Auto)  Not Reportable  


 


Eos # (Auto)  Not Reportable  


 


Baso # (Auto)  Not Reportable  


 


Absolute Nucleated RBC  Not Reportable  


 


Total Counted  100  


 


Band Neuts % (Manual)  13 H  


 


Reactive Lymphs % (Man)  2  


 


Abnorm Lymph % (Manual)  0  


 


Nucleated RBC %  Not Reportable  


 


Neutrophils # (Manual)  44.7 H  


 


Lymphocytes # (Manual)  2.9  


 


Monocytes # (Manual)  0.5  


 


Eosinophils # (Manual)  0.0  


 


Basophils # (Manual)  0.0  


 


Differential Comment  MANUAL DIFFERENTIAL  


 


Sodium   116 L* 


 


Potassium   < 1.5 L* 


 


Chloride   82 L 


 


Carbon Dioxide   20 L 


 


Anion Gap   14.0 H 


 


BUN   38 H 


 


Creatinine   2.5 H 


 


Estimated GFR (MDRD)   30 L 


 


Glucose   119 H 


 


Calcium   7.7 L 


 


Phosphorus   3.0 


 


Magnesium   2.0 


 


Total Bilirubin   0.5 


 


AST   18 


 


ALT   19 


 


Alkaline Phosphatase   173 H 


 


Total Protein   7.9 


 


Albumin   3.0 L 


 


Globulin   4.9 H 


 


Albumin/Globulin Ratio   0.6 L 


 


Nasal Adenovirus (PCR)    NOT DETECTED


 


Nasal B. parapertussis DNA (PCR)    NOT DETECTED


 


Nasal Coronavir 229E PCR    NOT DETECTED


 


Nasal Coronavir HKU1 PCR    NOT DETECTED


 


Nasal Coronavir NL63 PCR    NOT DETECTED


 


Nasal Coronavir OC43 PCR    NOT DETECTED


 


Nasal Enterovir/Rhinovir PCR    NOT DETECTED


 


Nasal Influenza B PCR    NOT DETECTED


 


Nasal Influenza A PCR    NOT DETECTED


 


Nasal Parainfluen 1 PCR    NOT DETECTED


 


Nasal Parainfluen 2 PCR    NOT DETECTED


 


Nasal Parainfluen 3 PCR    NOT DETECTED


 


Nasal Parainfluen 4 PCR    NOT DETECTED


 


Nasal RSV (PCR)    NOT DETECTED


 


Nasal B.pertussis DNA PCR    NOT DETECTED


 


Nasal C.pneumoniae (PCR)    NOT DETECTED


 


Augusto Human Metapneumo PCR    NOT DETECTED


 


Nasal M.pneumoniae (PCR)    NOT DETECTED


 


Nasal SARS-CoV-2 (PCR)    DETECTED A














PD MEDICAL DECISION MAKING





- ED course


Complexity details: reviewed old records, reviewed results, re-evaluated 

patient, considered differential, d/w patient


ED course: 


The patient was worked up with labs and found to have a white blood cell count 

of 48 which was new, and elevated hemoglobin of 18 and platelets over 400.  His 

sodium was 119 and his potassium was 1.5.  His creatinine was elevated at 2.1 

also.  The patient was started on IV normal saline and IV potassium.  I spoke 

with Dr. Patricia, who agreed to admit the patient to her service.








Departure





- Departure


Disposition: [facility] DC/Xfer


Clinical Impression: 


 Hypokalemia, Gitelman syndrome, Renal tubular acidosis type II, Generalized 

weakness





Condition: Fair


Discharge Date/Time: 10/10/22 14:40

## 2022-10-10 NOTE — XRAY REPORT
PROCEDURE:  Chest 1 View X-Ray

 

INDICATIONS:  COVID (+) on labs

 

TECHNIQUE:  One view of the chest was acquired.  

 

COMPARISON:  9/14/2022

 

FINDINGS:  

 

Surgical changes and devices:  None.  

 

Lungs and pleura:  Low lung volumes accentuate the pulmonary interstitium and heart size.

 

Mediastinum:  Mediastinal contours appear normal.  Heart size is normal.  

 

Bones and chest wall:  No suspicious bony lesions.  Overlying soft tissues appear unremarkable.  

 

 

IMPRESSION:  

 

Limited by low lung volumes. Consider full upright two-view inspiratory chest x-ray

 

Right lateral atelectasis and or infiltrate

 

Reviewed by: Jean-Paul Lyons MD on 10/10/2022 5:48 PM AKDT

Approved by: Jean-Paul Lyons MD on 10/10/2022 5:48 PM AKDT

 

 

Station ID:  [id number]

## 2022-10-10 NOTE — PHARMACY PROGRESS NOTE
- Best Possible Medication History


Admit Date and Time: 10/10/22 1418


Processed by: Pharmacy


Medication History completed: Yes


Patient Interview: Pt unable to participate


Secondary Source(s): Pharmacy records, Insurance records





As the person ultimately responsible for medication therapy, providers are able 

to order a medication from an existing home medication list in [facility] via the 

"Reconcile Routine" prior to Confirmation of that medication by support staff. 

Such practice is discouraged except when the physician, in their clinical 

judgment, deems that a medical need exists for a medication without regard to 

previous use.

## 2022-10-10 NOTE — HISTORY & PHYSICAL EXAMINATION
Chief Complaint





- Chief Complaint


Chief Complaint: Weakness





History of Present Illness





- Admitted From


Admitted From:: ED





- History Obtained From


History obtained from: ED provider, chart review





- History of Present Illness


HPI Comment/Other: 





This is a 32-year-old male who has a history of inherited kidney disorder called

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  He is on a high-dose of

daily potassium replacement 80 mEq p.o. 4 times daily.  He is well-known to our 

service since he needs admission for replacing potassium, the last admission was

1 month ago. He also has a recent new history of auditory and visual 

hallucinations on the admission 1 month ago. He has a long history of 

noncompliance. Leaving AMA.  


Patient stopped taking his potassium and said the reason was because he had "a 

lot going on".  By this he means that he was worried about what people would 

think about him having continued visual and auditory hallucinations.  He did not

see a counselor or any provider after that admission in September when he 

reported the same symptoms.  He is not suicidal.  He started to feel weak today 

and came to the ED and found to have a potassium level that was less than 1.5 

(not measurable).  He also has a sodium 116, white blood count 48.  Patient 

tested positive for COVID on the admission a month ago and again pos today.  He 

said he never had a cough or any shortness of breath.  He does have intermittent

diarrhea and had his last episode of diarrhea last night.  There has been no 

fever.  His appetite is okay.





The patient is being admitted to the Hospitalist service in the ICU for 

management of his severe electrolyte abnormalities.








History





- Past Medical History


Cardiovascular: reports: None


Respiratory: reports: None


Endocrine/Autoimmune: reports: None


GI: reports: None


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


: reports: None, Other (Gettelman's syndrome)


HEENT: reports: None


Psych: reports: Depression, Anxiety


Musculoskeletal: reports: None


Derm: reports: None, Other


MRSA Hx?: No





- Family & Social History


Family History: Mother: Alive and Well, Father: Alive and Well


Family History Comment/Other: Patients half sister has Gettlemans disease


Living arrangement: At home


Living Situation: With family


Social History Notes: Patient is currently living in an   near [place] [facility]

[facility] and lives with his parents. He and his family are originally from [place].

He is part of a indigenous [place] out of [place]. The patient moved to [facility] with his family more than 3 years ago. He suffers from a number of mental

health issues including depression, anxiety, and anger disorder. The patient is 

unemployed. The patient states his and family's thinks about moving out to 

[place]. He continues to smoke half a pack a day. He occasionaly smoke 

marijuana and denies any alcohol or other drug use.





- Substance History


Use: Uses substance without health or social issues: Tobacco, Cannabis





- POLST


Patient has POLST: No


POLST Status: Full Code





Meds/Allgy





- Home Medications


Home Medications: 


                                Ambulatory Orders











 Medication  Instructions  Recorded  Confirmed


 


Potassium Chloride [Klor-Con] 80 meq PO QID 30 Days #480 ea 06/09/22 10/10/22


 


Spironolactone [Aldactone] 12.5 mg PO BID 10/10/22 10/10/22














- Allergies


Allergies/Adverse Reactions: 


                                    Allergies











Allergy/AdvReac Type Severity Reaction Status Date / Time


 


No Known Drug Allergies Allergy   Verified 10/10/22 11:14














Review of Systems





- Constitutional


Constitutional: reports: Fatigue, Weakness





- Gastrointestinal


Gastrointestinal: reports: Diarrhea





- Musculoskeletal


Musculoskeletal: reports: Muscle weakness





- Psychiatric


Psychiatric: reports: Hallucinations (auditory and visual)





- All Other Systems


All Other Systems: reports: Reviewed and negative





Exam





- Vital Signs


Vital Signs: 





                                Vital Signs x48h











  Temp Pulse Resp BP Pulse Ox


 


 10/10/22 13:14   70  24  106/65  98


 


 10/10/22 11:14  36.5 C  90  20  100/60  98


 


 10/10/22 11:12  36 C L  90  20  100/60  98














- Physical Exam


General Appearance: positive: No acute distress, Alert


Eyes Bilateral: positive: Normal inspection, EOMI


ENT: positive: ENT inspection nml, No signs of dehydration


Neck: positive: Nml inspection, No JVD


Respiratory: positive: No respiratory distress


Cardiovascular: positive: Regular rate & rhythm, No murmur


Rectal: positive: Non-tender


Skin: positive: Warm, Dry


Extremities: positive: Non-tender, No pedal edema


Neurologic/Psychiatric: positive: Oriented x3 (Non-focal)





Conclusion/Plan





- Problem List


(1) Hypokalemia


Conclusion/Plan: 


Due to Gettleman syndrome.  Patient also has problems with noncompliance.


He will be admitted to the ICU for close monitoring on telemetry and rapid 

electrolyte replacement


Inpatient stay


We will start IV fluids with sodium and potassium plus resume his oral Potassium

 dosing


Recheck potassium every 6 hours








(2) Hyponatremia


Conclusion/Plan: 


He appears euvolemic.


Will order IV saline infusion.


Will order free water restriction.


Will follow his serum sodium every 6 hours








(3) Acute kidney injury


Conclusion/Plan: 


Acute kidney injury  is present with a concentrated hemoglobin, elevated 

BUN/creat.


Will order iv saline containing potassium at 100 cc an hour 


Avoid nephrotoxins.


Recheck BMP every 6 hours








(4) Gitelman syndrome


Conclusion/Plan: 


RTA Type II, as per Hx








(5) Leukocytosis


Conclusion/Plan: 


He had this during the last admission 1 month ago but at that time he had an 

abscess that required drainage.


Will evaluate for source of infection.


If he continues to have an elevated white cell count should consider a 

myeloproliferative disorder and he would need a referral to hematology.


Follow CBC with diff daily








(6) COVID-19


Conclusion/Plan: 


He was COVID-positive on the last admission a month ago and had abnormal chest 

x-ray.  His COVID test is positive today.


Will obtain a chest x-ray.


Will order isolation as he reports sx of diarrhea, as recent as yesterday.








(7) Hallucinations


Conclusion/Plan: 


Patient said he "would open up to a doctor who would sit and listen to his 

problems".  He would agree to have a psychiatry evaluation.


Will order a telepsych eval.








- Lab Results


Fish Bones: 


                                 10/10/22 12:07





                                 10/10/22 17:08





- Other


Other Results/Comments: 





Attestation: The patient is expected to be discharged or transferred to another 

facility within 96 hours: Yes.

## 2022-10-11 LAB
ANION GAP SERPL CALCULATED.4IONS-SCNC: 10 MMOL/L (ref 6–13)
ANION GAP SERPL CALCULATED.4IONS-SCNC: 11 MMOL/L (ref 6–13)
ANION GAP SERPL CALCULATED.4IONS-SCNC: 12 MMOL/L (ref 6–13)
BASOPHILS # BLD MANUAL: 0 10^3/UL (ref 0–0.1)
BASOPHILS NFR BLD AUTO: 0.2 %
BUN SERPL-MCNC: 23 MG/DL (ref 6–20)
BUN SERPL-MCNC: 26 MG/DL (ref 6–20)
BUN SERPL-MCNC: 28 MG/DL (ref 6–20)
CALCIUM UR-MCNC: 6.5 MG/DL (ref 8.5–10.3)
CALCIUM UR-MCNC: 7.2 MG/DL (ref 8.5–10.3)
CALCIUM UR-MCNC: 7.6 MG/DL (ref 8.5–10.3)
CHLORIDE SERPL-SCNC: 103 MMOL/L (ref 101–111)
CHLORIDE SERPL-SCNC: 92 MMOL/L (ref 101–111)
CHLORIDE SERPL-SCNC: 97 MMOL/L (ref 101–111)
CO2 SERPL-SCNC: 15 MMOL/L (ref 21–32)
CO2 SERPL-SCNC: 16 MMOL/L (ref 21–32)
CO2 SERPL-SCNC: 18 MMOL/L (ref 21–32)
CREAT SERPLBLD-SCNC: 2 MG/DL (ref 0.6–1.2)
CREAT SERPLBLD-SCNC: 2.1 MG/DL (ref 0.6–1.2)
CREAT SERPLBLD-SCNC: 2.1 MG/DL (ref 0.6–1.2)
EOSINOPHIL # BLD MANUAL: 0 10^3/UL (ref 0–0.7)
EOSINOPHIL NFR BLD AUTO: 0.4 %
ERYTHROCYTE [DISTWIDTH] IN BLOOD BY AUTOMATED COUNT: 13.1 % (ref 12–15)
GFRSERPLBLD MDRD-ARVRAT: 36 ML/MIN/{1.73_M2} (ref 89–?)
GFRSERPLBLD MDRD-ARVRAT: 36 ML/MIN/{1.73_M2} (ref 89–?)
GFRSERPLBLD MDRD-ARVRAT: 38 ML/MIN/{1.73_M2} (ref 89–?)
GLUCOSE SERPL-MCNC: 105 MG/DL (ref 70–100)
GLUCOSE SERPL-MCNC: 110 MG/DL (ref 70–100)
GLUCOSE SERPL-MCNC: 123 MG/DL (ref 70–100)
HCT VFR BLD AUTO: 37.4 % (ref 42–52)
HGB UR QL STRIP: 14.5 G/DL (ref 14–18)
LYMPH ABN NFR BLD MANUAL: 1 %
LYMPHOBLASTS # BLD: 8 %
LYMPHOCYTES # BLD MANUAL: 2.6 10^3/UL (ref 1.5–3.5)
LYMPHOCYTES NFR BLD AUTO: 7.5 %
MAGNESIUM SERPL-MCNC: 1.8 MG/DL (ref 1.7–2.8)
MANUAL DIF COMMENT BLD-IMP: (no result)
MCH RBC QN AUTO: 30.3 PG (ref 27–31)
MCHC RBC AUTO-ENTMCNC: 38.8 G/DL (ref 32–36)
MCV RBC AUTO: 78.1 FL (ref 80–94)
MONOCYTES # BLD MANUAL: 1.2 10^3/UL (ref 0–1)
MONOCYTES NFR BLD AUTO: 3 %
NEUTROPHILS # SNV AUTO: 29 X10^3/UL (ref 4.8–10.8)
NEUTROPHILS NFR BLD AUTO: 86 %
NEUTROPHILS NFR BLD MANUAL: 25.2 10^3/UL (ref 1.5–6.6)
NEUTS BAND NFR BLD: 1 %
PDW BLD AUTO: 9.5 FL (ref 7.4–11.4)
PLAT MORPH BLD: (no result)
PLATELET # BLD: 308 10^3/UL (ref 130–450)
PLATELET BLD QL SMEAR: (no result)
POTASSIUM SERPL-SCNC: 1.5 MMOL/L (ref 3.5–5)
POTASSIUM SERPL-SCNC: 1.9 MMOL/L (ref 3.5–5)
POTASSIUM SERPL-SCNC: 2.8 MMOL/L (ref 3.5–5)
RBC MAR: 4.79 10^6/UL (ref 4.7–6.1)
RBC MORPH BLD: (no result)
SODIUM SERPLBLD-SCNC: 119 MMOL/L (ref 135–145)
SODIUM SERPLBLD-SCNC: 124 MMOL/L (ref 135–145)
SODIUM SERPLBLD-SCNC: 131 MMOL/L (ref 135–145)
WBC MORPH BLD: (no result)

## 2022-10-11 RX ADMIN — POTASSIUM CHLORIDE SCH MLS/HR: 7.46 INJECTION, SOLUTION INTRAVENOUS at 01:32

## 2022-10-11 RX ADMIN — SODIUM CHLORIDE SCH MLS/HR: 9 INJECTION, SOLUTION INTRAVENOUS at 13:49

## 2022-10-11 RX ADMIN — SODIUM CHLORIDE SCH MLS/HR: 9 INJECTION, SOLUTION INTRAVENOUS at 01:12

## 2022-10-11 RX ADMIN — SODIUM CHLORIDE, PRESERVATIVE FREE SCH ML: 5 INJECTION INTRAVENOUS at 09:43

## 2022-10-11 RX ADMIN — SODIUM CHLORIDE SCH MLS/HR: 9 INJECTION, SOLUTION INTRAVENOUS at 09:39

## 2022-10-11 RX ADMIN — SODIUM CHLORIDE, PRESERVATIVE FREE SCH ML: 5 INJECTION INTRAVENOUS at 18:34

## 2022-10-11 RX ADMIN — POTASSIUM CHLORIDE SCH MEQ: 20 SOLUTION ORAL at 10:42

## 2022-10-11 RX ADMIN — POTASSIUM CHLORIDE SCH MEQ: 20 SOLUTION ORAL at 21:41

## 2022-10-11 RX ADMIN — POTASSIUM CHLORIDE SCH MEQ: 20 SOLUTION ORAL at 05:00

## 2022-10-11 RX ADMIN — POTASSIUM CHLORIDE SCH MEQ: 20 SOLUTION ORAL at 16:10

## 2022-10-11 RX ADMIN — SODIUM CHLORIDE SCH MLS/HR: 9 INJECTION, SOLUTION INTRAVENOUS at 08:15

## 2022-10-11 RX ADMIN — SODIUM CHLORIDE, PRESERVATIVE FREE SCH: 5 INJECTION INTRAVENOUS at 00:20

## 2022-10-11 RX ADMIN — SODIUM CHLORIDE SCH MLS/HR: 9 INJECTION, SOLUTION INTRAVENOUS at 18:32

## 2022-10-11 RX ADMIN — SODIUM CHLORIDE SCH MLS/HR: 9 INJECTION, SOLUTION INTRAVENOUS at 23:20

## 2022-10-11 RX ADMIN — POTASSIUM CHLORIDE SCH MLS/HR: 7.46 INJECTION, SOLUTION INTRAVENOUS at 00:23

## 2022-10-11 RX ADMIN — POTASSIUM CHLORIDE SCH MLS/HR: 7.46 INJECTION, SOLUTION INTRAVENOUS at 02:34

## 2022-10-11 NOTE — PROVIDER PROGRESS NOTE
Subjective





- Prog Note Date


Prog Note Date: 10/11/22


Prog Note Time: 13:48





- Subjective


Pt reports feeling: Improved (Reports slight improvement.)





Current Medications





- Current Medications


Current Medications: 





Active Medications





Bacitracin (Bacitracin Zinc Oint 1 Packet)  1 packet TOP PRN PRN


   PRN Reason: Skin Care


   Last Admin: 10/11/22 10:00 Dose:  1 packet


   


Sodium Chloride (Normal Saline 0.9%)  1,000 mls @ 150 mls/hr IV .[id number] [facility]


   Last Infusion: 10/11/22 11:10 Dose:  0 mls/hr


   


Potassium Chloride 40 meq/ (Sodium Chloride)  520 mls @ 125 mls/hr IV Q4H [facility]


   Stop: 10/11/22 16:59


   Last Infusion: 10/11/22 13:48 Dose:  125 mls/hr


   


Ondansetron HCl (Ondansetron 4 Mg/2 Ml Vial)  4 mg IVP Q6HR PRN


   PRN Reason: Nausea / Vomiting


Potassium Chloride (Potassium Chloride 20 Meq/15 Ml Udc)  80 meq PO Q6H [facility]


   Last Admin: 10/11/22 10:42 Dose:  80 meq


   


Sodium Chloride (Sodium Chloride Flush 0.9% 10 Ml Syringe)  10 ml IVP 

0100,0900,1700 [facility]


   Last Admin: 10/11/22 09:43 Dose:  10 ml


   


Sodium Chloride (Sodium Chloride Flush 0.9% 10 Ml Syringe)  10 ml IVP PRN PRN


   PRN Reason: AS NEEDED PER PROVIDER ORDERS





                                        





Spironolactone [Aldactone] 12.5 mg PO BID 10/10/22 











Objective





- Vital Signs/Intake & Output


Reviewed Vital Signs: Yes


Vital Signs: 


                                Vital Signs x48h











  Temp Pulse Resp BP Pulse Ox


 


 10/11/22 13:00   79  21  87/54 L 


 


 10/11/22 12:00  36.3 C L  90  15  100/71  95


 


 10/11/22 11:00   91  21  91/60 


 


 10/11/22 10:00   78  21  100/69  95


 


 10/11/22 09:00   88  23  100/58 L  94


 


 10/11/22 08:00  36.5 C  77  20  109/64  100


 


 10/11/22 07:00   71  16  79/48 L  96


 


 10/11/22 06:00   81  16  89/62 L  99











Intake & Output: 


                                 Intake & Output











 10/08/22 10/09/22 10/10/22 10/11/22





 23:59 23:59 23:59 23:59


 


Intake Total   2697.5 6347.5


 


Output Total   2350 4350


 


Balance   347.5 1997.5














- Objective


General Appearance: positive: No acute distress, Alert


Eyes Bilateral: positive: Normal inspection, EOMI


ENT: positive: ENT inspection nml


Neck: positive: Nml inspection.  negative: Stiff neck


Respiratory: positive: Chest non-tender, No respiratory distress


Cardiovascular: positive: Regular rate & rhythm


Abdomen: positive: Non-tender, No distention


Back: positive: Nml inspection


Skin: positive: Color nml


Extremities: positive: Non-tender


Neurologic/Psychiatric: positive: Oriented x3





- Lab Results


Fish Bones: 


                                 10/11/22 04:14





                                 10/11/22 10:52


Other Labs: 


                               Lab Results x24hrs











  10/11/22 10/11/22 10/11/22 Range/Units





  10:52 04:14 04:14 


 


WBC    29.0 H  (4.8-10.8)  x10^3/uL


 


RBC    4.79  (4.70-6.10)  10^6/uL


 


Hgb    14.5  (14.0-18.0)  g/dL


 


Hct    37.4 L  (42.0-52.0)  %


 


MCV    78.1 L  (80.0-94.0)  fL


 


MCH    30.3  (27.0-31.0)  pg


 


MCHC    38.8 H  (32.0-36.0)  g/dL


 


RDW    13.1  (12.0-15.0)  %


 


Plt Count    308  (130-450)  10^3/uL


 


MPV    9.5  (7.4-11.4)  fL


 


Neut # (Auto)    Not Reportable  


 


Lymph # (Auto)    Not Reportable  


 


Mono # (Auto)    Not Reportable  


 


Eos # (Auto)    Not Reportable  


 


Baso # (Auto)    Not Reportable  


 


Absolute Nucleated RBC    Not Reportable  


 


Total Counted    100  


 


Band Neuts % (Manual)    1 (0 - 10) %


 


Abnorm Lymph % (Manual)    1  %


 


Nucleated RBC %    Not Reportable  


 


Neutrophils # (Manual)    25.2 H  (1.5-6.6)  10^3/uL


 


Lymphocytes # (Manual)    2.6  (1.5-3.5)  10^3/uL


 


Monocytes # (Manual)    1.2 H  (0.0-1.0)  10^3/uL


 


Eosinophils # (Manual)    0.0  (0-0.7)  10^3/uL


 


Basophils # (Manual)    0.0  (0-0.1)  10^3/uL


 


Differential Comment    MANUAL DIFFERENTIAL  


 


WBC Morphology    NORMAL APPEARANCE  (NORMAL)  


 


Platelet Estimate    NORMAL (130-450,000)  (NORMAL)  


 


Platelet Morphology    NORMAL APPEARANCE  (NORMAL)  


 


RBC Morph Micro Appear    2+ MACROCYTOSIS  (NORMAL)  


 


Sodium  124 L  119 L*   (135-145)  mmol/L


 


Potassium  1.9 L*  1.5 L*   (3.5-5.0)  mmol/L


 


Chloride  97 L  92 L   (101-111)  mmol/L


 


Carbon Dioxide  16 L  15 L   (21-32)  mmol/L


 


Anion Gap  11.0  12.0   (6-13)  


 


BUN  26 H  28 H   (6-20)  mg/dL


 


Creatinine  2.1 H  2.1 H   (0.6-1.2)  mg/dL


 


Estimated GFR (MDRD)  36 L  36 L   (>89)  


 


Glucose  123 H  110 H   ()  mg/dL


 


Calcium  7.2 L  6.5 L*   (8.5-10.3)  mg/dL


 


Magnesium   1.8   (1.7-2.8)  mg/dL


 


Nasal Adenovirus (PCR)     


 


Nasal B. parapertussis DNA (PCR)     


 


Nasal Coronavir 229E PCR     


 


Nasal Coronavir HKU1 PCR     


 


Nasal Coronavir NL63 PCR     


 


Nasal Coronavir OC43 PCR     


 


Nasal Enterovir/Rhinovir PCR     


 


Nasal Influenza B PCR     


 


Nasal Influenza A PCR     


 


Nasal Parainfluen 1 PCR     


 


Nasal Parainfluen 2 PCR     


 


Nasal Parainfluen 3 PCR     


 


Nasal Parainfluen 4 PCR     


 


Nasal RSV (PCR)     


 


Nasal Screen MRSA (PCR)     (NEGATIVE)  


 


Nasal B.pertussis DNA PCR     


 


Nasal C.pneumoniae (PCR)     


 


Augusto Human Metapneumo PCR     


 


Nasal M.pneumoniae (PCR)     


 


Nasal SARS-CoV-2 (PCR)     














  10/10/22 10/10/22 10/10/22 Range/Units





  23:22 17:08 14:45 


 


WBC     (4.8-10.8)  x10^3/uL


 


RBC     (4.70-6.10)  10^6/uL


 


Hgb     (14.0-18.0)  g/dL


 


Hct     (42.0-52.0)  %


 


MCV     (80.0-94.0)  fL


 


MCH     (27.0-31.0)  pg


 


MCHC     (32.0-36.0)  g/dL


 


RDW     (12.0-15.0)  %


 


Plt Count     (130-450)  10^3/uL


 


MPV     (7.4-11.4)  fL


 


Neut # (Auto)     


 


Lymph # (Auto)     


 


Mono # (Auto)     


 


Eos # (Auto)     


 


Baso # (Auto)     


 


Absolute Nucleated RBC     


 


Total Counted     


 


Band Neuts % (Manual)    (0 - 10) %


 


Abnorm Lymph % (Manual)     %


 


Nucleated RBC %     


 


Neutrophils # (Manual)     (1.5-6.6)  10^3/uL


 


Lymphocytes # (Manual)     (1.5-3.5)  10^3/uL


 


Monocytes # (Manual)     (0.0-1.0)  10^3/uL


 


Eosinophils # (Manual)     (0-0.7)  10^3/uL


 


Basophils # (Manual)     (0-0.1)  10^3/uL


 


Differential Comment     


 


WBC Morphology     (NORMAL)  


 


Platelet Estimate     (NORMAL)  


 


Platelet Morphology     (NORMAL)  


 


RBC Morph Micro Appear     (NORMAL)  


 


Sodium  119 L*  118 L*   (135-145)  mmol/L


 


Potassium  < 1.5 L*  < 1.5 L*   (3.5-5.0)  mmol/L


 


Chloride  89 L  86 L   (101-111)  mmol/L


 


Carbon Dioxide  17 L  18 L   (21-32)  mmol/L


 


Anion Gap  13.0  14.0 H   (6-13)  


 


BUN  33 H  35 H   (6-20)  mg/dL


 


Creatinine  2.3 H  2.4 H   (0.6-1.2)  mg/dL


 


Estimated GFR (MDRD)  33 L  31 L   (>89)  


 


Glucose  100  132 H   ()  mg/dL


 


Calcium  6.7 L  7.3 L   (8.5-10.3)  mg/dL


 


Magnesium     (1.7-2.8)  mg/dL


 


Nasal Adenovirus (PCR)     


 


Nasal B. parapertussis DNA (PCR)     


 


Nasal Coronavir 229E PCR     


 


Nasal Coronavir HKU1 PCR     


 


Nasal Coronavir NL63 PCR     


 


Nasal Coronavir OC43 PCR     


 


Nasal Enterovir/Rhinovir PCR     


 


Nasal Influenza B PCR     


 


Nasal Influenza A PCR     


 


Nasal Parainfluen 1 PCR     


 


Nasal Parainfluen 2 PCR     


 


Nasal Parainfluen 3 PCR     


 


Nasal Parainfluen 4 PCR     


 


Nasal RSV (PCR)     


 


Nasal Screen MRSA (PCR)    NEGATIVE  (NEGATIVE)  


 


Nasal B.pertussis DNA PCR     


 


Nasal C.pneumoniae (PCR)     


 


Augusto Human Metapneumo PCR     


 


Nasal M.pneumoniae (PCR)     


 


Nasal SARS-CoV-2 (PCR)     














  10/10/22 Range/Units





  13:15 


 


WBC   (4.8-10.8)  x10^3/uL


 


RBC   (4.70-6.10)  10^6/uL


 


Hgb   (14.0-18.0)  g/dL


 


Hct   (42.0-52.0)  %


 


MCV   (80.0-94.0)  fL


 


MCH   (27.0-31.0)  pg


 


MCHC   (32.0-36.0)  g/dL


 


RDW   (12.0-15.0)  %


 


Plt Count   (130-450)  10^3/uL


 


MPV   (7.4-11.4)  fL


 


Neut # (Auto)   


 


Lymph # (Auto)   


 


Mono # (Auto)   


 


Eos # (Auto)   


 


Baso # (Auto)   


 


Absolute Nucleated RBC   


 


Total Counted   


 


Band Neuts % (Manual)  (0 - 10) %


 


Abnorm Lymph % (Manual)   %


 


Nucleated RBC %   


 


Neutrophils # (Manual)   (1.5-6.6)  10^3/uL


 


Lymphocytes # (Manual)   (1.5-3.5)  10^3/uL


 


Monocytes # (Manual)   (0.0-1.0)  10^3/uL


 


Eosinophils # (Manual)   (0-0.7)  10^3/uL


 


Basophils # (Manual)   (0-0.1)  10^3/uL


 


Differential Comment   


 


WBC Morphology   (NORMAL)  


 


Platelet Estimate   (NORMAL)  


 


Platelet Morphology   (NORMAL)  


 


RBC Morph Micro Appear   (NORMAL)  


 


Sodium   (135-145)  mmol/L


 


Potassium   (3.5-5.0)  mmol/L


 


Chloride   (101-111)  mmol/L


 


Carbon Dioxide   (21-32)  mmol/L


 


Anion Gap   (6-13)  


 


BUN   (6-20)  mg/dL


 


Creatinine   (0.6-1.2)  mg/dL


 


Estimated GFR (MDRD)   (>89)  


 


Glucose   ()  mg/dL


 


Calcium   (8.5-10.3)  mg/dL


 


Magnesium   (1.7-2.8)  mg/dL


 


Nasal Adenovirus (PCR)  NOT DETECTED  


 


Nasal B. parapertussis DNA (PCR)  NOT DETECTED  


 


Nasal Coronavir 229E PCR  NOT DETECTED  


 


Nasal Coronavir HKU1 PCR  NOT DETECTED  


 


Nasal Coronavir NL63 PCR  NOT DETECTED  


 


Nasal Coronavir OC43 PCR  NOT DETECTED  


 


Nasal Enterovir/Rhinovir PCR  NOT DETECTED  


 


Nasal Influenza B PCR  NOT DETECTED  


 


Nasal Influenza A PCR  NOT DETECTED  


 


Nasal Parainfluen 1 PCR  NOT DETECTED  


 


Nasal Parainfluen 2 PCR  NOT DETECTED  


 


Nasal Parainfluen 3 PCR  NOT DETECTED  


 


Nasal Parainfluen 4 PCR  NOT DETECTED  


 


Nasal RSV (PCR)  NOT DETECTED  


 


Nasal Screen MRSA (PCR)   (NEGATIVE)  


 


Nasal B.pertussis DNA PCR  NOT DETECTED  


 


Nasal C.pneumoniae (PCR)  NOT DETECTED  


 


Augusto Human Metapneumo PCR  NOT DETECTED  


 


Nasal M.pneumoniae (PCR)  NOT DETECTED  


 


Nasal SARS-CoV-2 (PCR)  DETECTED A  














ABX Reporting


Has patient been on IV antibiotics over the past 48 hours?: No





Assessment/Plan





- Problem List


(1) Hypokalemia


Impression: 


Due to Gitelman syndrome, today it is 1.9, up from 1.5 yesterday. Pt has a 

history of noncompliance and leaving AMA before his potassium is back to normal 

limits.





Plan:


Continue telemetry, rapid electrolyte replacement, and close monitoring in the 

ICU.


IV replacement of sodium and potassium plus his oral potassium dosing.


Continue to check potassium every 6 hours.











(2) Hyponatremia syndrome


Impression: 


Appears euvolemic. Sodium is still low today at 124, up from 119 yesterday.





Plan:


Continue IV saline infusion, free water restriction, re-check levels every 6 

hours.











(3) Acute kidney injury


Impression: 


Acute kidney injury  is present with a concentrated hemoglobin, elevated 

BUN/creat. Today his Cr is 2.1 (same as yesterday) and his BUN is 26 (down from 

28 yesterday).





Plan:


Continue IV saline with potassium at 100mL/hour. 


Avoid nephrotoxins.


Recheck BMP every 6 hours











(4) Gitelman syndrome


Impression: 


RTA Type II, as per Hx.





Plan:


Monitor and replace electrolytes as needed.








(5) Leukocytosis


Impression: 


He had leukocytosis during his last admission a moth ago, but at the time this 

was attributed to an abscess that required draining. Today, his WBC is still 

elevated at 29, but it is down from 48.1 yesterday.





Plan:


Evaluate for source of infection.


CBC daily.


If his WBC counts continue to be elevated, he should be evaluated for 

myoproliferative disorders, this would require a hematology referral.








(6) COVID-19


Impression: 


He was COVID-19 positive at his last admission a month ago. His chest x-ray was 

abnormal. His COVID- test is once again positive.


His chest x-ray shows right lateral atelectasis and/or infiltrate. 





Plan: 


Continue isolation precautions.


Monitor for worsening symptoms.








(7) Hallucinations


Impression: 


He is amenable to mental health. He denied any hallucinations this morning.





Plan:


A telepsych evaluation has been ordered on 10/10.


Will continue to monitor him for worsening of symptoms.

## 2022-10-12 LAB
ANION GAP SERPL CALCULATED.4IONS-SCNC: 10 MMOL/L (ref 6–13)
BASOPHILS # BLD MANUAL: 0 10^3/UL (ref 0–0.1)
BASOPHILS NFR BLD AUTO: 0.1 %
BUN SERPL-MCNC: 22 MG/DL (ref 6–20)
CALCIUM UR-MCNC: 7.9 MG/DL (ref 8.5–10.3)
CHLORIDE SERPL-SCNC: 112 MMOL/L (ref 101–111)
CO2 SERPL-SCNC: 14 MMOL/L (ref 21–32)
CREAT SERPLBLD-SCNC: 1.7 MG/DL (ref 0.6–1.2)
EOSINOPHIL # BLD MANUAL: 0.3 10^3/UL (ref 0–0.7)
EOSINOPHIL NFR BLD AUTO: 0.9 %
ERYTHROCYTE [DISTWIDTH] IN BLOOD BY AUTOMATED COUNT: 14.6 % (ref 12–15)
GFRSERPLBLD MDRD-ARVRAT: 46 ML/MIN/{1.73_M2} (ref 89–?)
GLUCOSE SERPL-MCNC: 87 MG/DL (ref 70–100)
HCT VFR BLD AUTO: 42.9 % (ref 42–52)
HGB UR QL STRIP: 15.1 G/DL (ref 14–18)
LYMPH ABN NFR BLD MANUAL: 0 %
LYMPHOBLASTS # BLD: 23 %
LYMPHOCYTES # BLD MANUAL: 3.2 10^3/UL (ref 1.5–3.5)
LYMPHOCYTES NFR BLD AUTO: 20.3 %
MANUAL DIF COMMENT BLD-IMP: (no result)
MCH RBC QN AUTO: 29.8 PG (ref 27–31)
MCHC RBC AUTO-ENTMCNC: 35.2 G/DL (ref 32–36)
MCV RBC AUTO: 84.6 FL (ref 80–94)
METAMYELOCYTES NFR BLD: 1 %
MONOCYTES # BLD MANUAL: 0.3 10^3/UL (ref 0–1)
MONOCYTES NFR BLD AUTO: 3.7 %
NEUTROPHILS # SNV AUTO: 13.9 X10^3/UL (ref 4.8–10.8)
NEUTROPHILS NFR BLD AUTO: 67.8 %
NEUTROPHILS NFR BLD MANUAL: 10 10^3/UL (ref 1.5–6.6)
NEUTS BAND NFR BLD: 1 %
PDW BLD AUTO: 8.7 FL (ref 7.4–11.4)
PLAT MORPH BLD: (no result)
PLATELET # BLD: 436 10^3/UL (ref 130–450)
PLATELET BLD QL SMEAR: (no result)
POTASSIUM SERPL-SCNC: 2.3 MMOL/L (ref 3.5–5)
RBC MAR: 5.07 10^6/UL (ref 4.7–6.1)
RBC MORPH BLD: (no result)
SODIUM SERPLBLD-SCNC: 136 MMOL/L (ref 135–145)
WBC MORPH BLD: (no result)

## 2022-10-12 RX ADMIN — SODIUM CHLORIDE, PRESERVATIVE FREE SCH ML: 5 INJECTION INTRAVENOUS at 09:01

## 2022-10-12 RX ADMIN — POTASSIUM CHLORIDE SCH MEQ: 20 SOLUTION ORAL at 16:06

## 2022-10-12 RX ADMIN — POTASSIUM CHLORIDE SCH MLS/HR: 7.46 INJECTION, SOLUTION INTRAVENOUS at 21:07

## 2022-10-12 RX ADMIN — SODIUM CHLORIDE, PRESERVATIVE FREE SCH: 5 INJECTION INTRAVENOUS at 15:20

## 2022-10-12 RX ADMIN — SODIUM CHLORIDE, PRESERVATIVE FREE SCH: 5 INJECTION INTRAVENOUS at 23:26

## 2022-10-12 RX ADMIN — POTASSIUM CHLORIDE SCH MEQ: 1500 TABLET, EXTENDED RELEASE ORAL at 21:09

## 2022-10-12 RX ADMIN — POTASSIUM CHLORIDE SCH MEQ: 20 SOLUTION ORAL at 04:19

## 2022-10-12 RX ADMIN — POTASSIUM CHLORIDE SCH MLS/HR: 7.46 INJECTION, SOLUTION INTRAVENOUS at 22:44

## 2022-10-12 RX ADMIN — SODIUM CHLORIDE SCH MLS/HR: 9 INJECTION, SOLUTION INTRAVENOUS at 09:01

## 2022-10-12 RX ADMIN — SODIUM CHLORIDE SCH MLS/HR: 9 INJECTION, SOLUTION INTRAVENOUS at 13:40

## 2022-10-12 RX ADMIN — POTASSIUM CHLORIDE SCH MLS/HR: 7.46 INJECTION, SOLUTION INTRAVENOUS at 17:53

## 2022-10-12 RX ADMIN — POTASSIUM CHLORIDE SCH MEQ: 20 SOLUTION ORAL at 09:01

## 2022-10-12 RX ADMIN — SODIUM CHLORIDE, PRESERVATIVE FREE SCH ML: 5 INJECTION INTRAVENOUS at 04:19

## 2022-10-12 RX ADMIN — POTASSIUM CHLORIDE SCH MLS/HR: 7.46 INJECTION, SOLUTION INTRAVENOUS at 19:17

## 2022-10-12 NOTE — PROVIDER PROGRESS NOTE
Subjective





- Prog Note Date


Prog Note Date: 10/12/22


Prog Note Time: 09:31





- Subjective


Pt reports feeling: Improved (Reports feeling better overall.)





Current Medications





- Current Medications


Current Medications: 





Active Medications





Acetaminophen (Acetaminophen 325 Mg Tablet)  650 mg PO Q4HR PRN


   PRN Reason: Pain or Fever > 38C (100.4F)


   Last Admin: 10/11/22 14:18 Dose:  650 mg


   


Bacitracin (Bacitracin Zinc Oint 1 Packet)  1 packet TOP PRN PRN


   PRN Reason: Skin Care


   Last Admin: 10/11/22 10:00 Dose:  1 packet


   


Potassium Chloride 40 meq/ (Sodium Chloride)  520 mls @ 130 mls/hr IV Q4H NEISHA


   Stop: 10/12/22 20:59


   Last Admin: 10/12/22 09:01 Dose:  130 mls/hr


   


Ondansetron HCl (Ondansetron 4 Mg/2 Ml Vial)  4 mg IVP Q6HR PRN


   PRN Reason: Nausea / Vomiting


Potassium Chloride (Potassium Chloride 20 Meq/15 Ml Udc)  80 meq PO Q6H NEISHA


   Last Admin: 10/12/22 09:01 Dose:  80 meq


   


Sodium Chloride (Sodium Chloride Flush 0.9% 10 Ml Syringe)  10 ml IVP 

0100,0900,1700 NEISHA


   Last Admin: 10/12/22 09:01 Dose:  10 ml


   


Sodium Chloride (Sodium Chloride Flush 0.9% 10 Ml Syringe)  10 ml IVP PRN PRN


   PRN Reason: AS NEEDED PER PROVIDER ORDERS





                                        





Spironolactone [Aldactone] 12.5 mg PO BID 10/10/22 











Objective





- Vital Signs/Intake & Output


Reviewed Vital Signs: Yes


Vital Signs: 


                                Vital Signs x48h











  Temp Pulse Resp BP Pulse Ox


 


 10/12/22 08:00  36.7 C  99  22  120/80  99


 


 10/12/22 07:00   86  17  111/59 L 


 


 10/12/22 06:00   81  18  94/53 L 


 


 10/12/22 05:00   81  20  97/58 L 


 


 10/12/22 04:00   88  17  107/64 


 


 10/12/22 03:00   82  17  112/59 L 


 


 10/12/22 02:00   73  20  90/48 L 











Intake & Output: 


                                 Intake & Output











 10/09/22 10/10/22 10/11/22 10/12/22





 23:59 23:59 23:59 23:59


 


Intake Total  2697.5 8447.50 890


 


Output Total  2350 7550 1100


 


Balance  347.5 897.50 -210














- Objective


General Appearance: positive: No acute distress, Anxious (Anxious about staying 

for any longer, states that he has a lot to do back home.)


Eyes Bilateral: positive: Normal inspection, PERRL, EOMI, No lid inflammation, 

No scleral icterus


ENT: positive: ENT inspection nml


Neck: positive: Nml inspection


Respiratory: positive: Chest non-tender, No respiratory distress


Cardiovascular: positive: Regular rate & rhythm


Abdomen: positive: Non-tender, Nml bowel sounds, No distention, Other (Left 

lower quadrant wound.)


Back: positive: Nml inspection


Skin: positive: Color nml


Extremities: positive: Nml appearance


Neurologic/Psychiatric: positive: Oriented x3





- Lab Results


Fish Bones: 


                                 10/12/22 04:22





                                 10/12/22 04:22


Other Labs: 


                               Lab Results x24hrs











  10/12/22 10/12/22 10/11/22 Range/Units





  04:22 04:22 17:12 


 


WBC   13.9 H   (4.8-10.8)  x10^3/uL


 


RBC   5.07   (4.70-6.10)  10^6/uL


 


Hgb   15.1   (14.0-18.0)  g/dL


 


Hct   42.9   (42.0-52.0)  %


 


MCV   84.6   (80.0-94.0)  fL


 


MCH   29.8   (27.0-31.0)  pg


 


MCHC   35.2   (32.0-36.0)  g/dL


 


RDW   14.6   (12.0-15.0)  %


 


Plt Count   436   (130-450)  10^3/uL


 


MPV   8.7   (7.4-11.4)  fL


 


Neut # (Auto)   Not Reportable   


 


Lymph # (Auto)   Not Reportable   


 


Mono # (Auto)   Not Reportable   


 


Eos # (Auto)   Not Reportable   


 


Baso # (Auto)   Not Reportable   


 


Absolute Nucleated RBC   Not Reportable   


 


Total Counted   100   


 


Band Neuts % (Manual)   1  (0 - 10) %


 


Abnorm Lymph % (Manual)   0   %


 


Metamyelocytes %   1 H  ( - 0) %


 


Nucleated RBC %   Not Reportable   


 


Neutrophils # (Manual)   10.0 H   (1.5-6.6)  10^3/uL


 


Lymphocytes # (Manual)   3.2   (1.5-3.5)  10^3/uL


 


Monocytes # (Manual)   0.3   (0.0-1.0)  10^3/uL


 


Eosinophils # (Manual)   0.3   (0-0.7)  10^3/uL


 


Basophils # (Manual)   0.0   (0-0.1)  10^3/uL


 


Differential Comment   MANUAL DIFFERENTIAL   


 


WBC Morphology   NORMAL APPEARANCE   (NORMAL)  


 


Platelet Estimate   NORMAL (130-450,000)   (NORMAL)  


 


Platelet Morphology   NORMAL APPEARANCE   (NORMAL)  


 


RBC Morph Micro Appear   1+ MACROCYTOSIS   (NORMAL)  


 


Sodium  136   131 L  (135-145)  mmol/L


 


Potassium  2.3 L*   2.8 L  (3.5-5.0)  mmol/L


 


Chloride  112 H   103  (101-111)  mmol/L


 


Carbon Dioxide  14 L   18 L  (21-32)  mmol/L


 


Anion Gap  10.0   10.0  (6-13)  


 


BUN  22 H   23 H  (6-20)  mg/dL


 


Creatinine  1.7 H   2.0 H  (0.6-1.2)  mg/dL


 


Estimated GFR (MDRD)  46 L   38 L  (>89)  


 


Glucose  87   105 H  ()  mg/dL


 


Calcium  7.9 L   7.6 L  (8.5-10.3)  mg/dL














  10/11/22 Range/Units





  10:52 


 


WBC   (4.8-10.8)  x10^3/uL


 


RBC   (4.70-6.10)  10^6/uL


 


Hgb   (14.0-18.0)  g/dL


 


Hct   (42.0-52.0)  %


 


MCV   (80.0-94.0)  fL


 


MCH   (27.0-31.0)  pg


 


MCHC   (32.0-36.0)  g/dL


 


RDW   (12.0-15.0)  %


 


Plt Count   (130-450)  10^3/uL


 


MPV   (7.4-11.4)  fL


 


Neut # (Auto)   


 


Lymph # (Auto)   


 


Mono # (Auto)   


 


Eos # (Auto)   


 


Baso # (Auto)   


 


Absolute Nucleated RBC   


 


Total Counted   


 


Band Neuts % (Manual)  (0 - 10) %


 


Abnorm Lymph % (Manual)   %


 


Metamyelocytes %  ( - 0) %


 


Nucleated RBC %   


 


Neutrophils # (Manual)   (1.5-6.6)  10^3/uL


 


Lymphocytes # (Manual)   (1.5-3.5)  10^3/uL


 


Monocytes # (Manual)   (0.0-1.0)  10^3/uL


 


Eosinophils # (Manual)   (0-0.7)  10^3/uL


 


Basophils # (Manual)   (0-0.1)  10^3/uL


 


Differential Comment   


 


WBC Morphology   (NORMAL)  


 


Platelet Estimate   (NORMAL)  


 


Platelet Morphology   (NORMAL)  


 


RBC Morph Micro Appear   (NORMAL)  


 


Sodium  124 L  (135-145)  mmol/L


 


Potassium  1.9 L*  (3.5-5.0)  mmol/L


 


Chloride  97 L  (101-111)  mmol/L


 


Carbon Dioxide  16 L  (21-32)  mmol/L


 


Anion Gap  11.0  (6-13)  


 


BUN  26 H  (6-20)  mg/dL


 


Creatinine  2.1 H  (0.6-1.2)  mg/dL


 


Estimated GFR (MDRD)  36 L  (>89)  


 


Glucose  123 H  ()  mg/dL


 


Calcium  7.2 L  (8.5-10.3)  mg/dL














ABX Reporting


Has patient been on IV antibiotics over the past 48 hours?: No





Assessment/Plan





- Problem List


(1) Hypokalemia


Impression: 


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 

concentration yesterday was 2.9. Pt has a history of noncompliance and leaving 

[facility] before his potassium is back to normal limits. I had a good conversation 

with Mr Winters about the importance of trying to stay for as long as he could, 

so that his electrolytes could be optimized. He seemed receptive to what I had 

to say. He was transferred from the ICU to the med-surg floor today.





Plan:


Continue telemetry and rapid electrolyte replacement with both IV and oral 

potassium (oral 80meq, IV 40meq in sodium chloride).


Check potassium levels every 6 hours.








(2) Hyponatremia syndrome


Impression: 


Resolved, sodium concentration was 136 today. He appears euvolemic, but said he 

felt very thirsty today. 





Plan:


Continue free water restriction and keep giving potassium riders with normal 

saline.


Monitor sodium concentration and adjust as needed.








(3) Acute kidney injury


Impression: 


Acute kidney injury  is present, his hemoglobin is no longer concentrated (down 

to 15.1 from 18.9 yesterday) elevated BUN/creat. Today his Cr is 1.7, down from 

2.1 yesterday and his BUN is 22 (down from 26 yesterday). Overall Hgb, Cr, and 

BUN are in a consistent downward trend.





Plan:


Continue IV saline with potassium at 100mL/hour. 


Avoid nephrotoxins.


Continue to monitor BMP every 6 hours.








(4) Gitelman syndrome


Impression: 


RTA Type II, as per Hx.





Plan:


Monitor and replace electrolytes as needed.








(5) Leukocytosis


Impression: 


He had leukocytosis during his last admission a month ago, but at the time this 

was attributed to an abscess that required draining. Today, his WBC is still 

elevated at 13.9, but it is down from 29 yesterday. Overall, his WBC count is on

 a consistent downward trend, he is afebrile, and not hypotensive. 





With his last admission there was a pathology review for his cells.  "Review of 

the peripheral blood smear collection reveals elevated absolute erythrocyte 

count, as well as leukocytosis.  The leukocytes are predominantly comprised of 

mature neutrophils.  Other than confirmation of the CBC indices, no additional 

diagnostic alterations are identified by microscopic examination of the 

peripheral blood.  If a clinical explanation for the findings is not apparent, 

the possibility of a myeloproliferative disorder could be considered, although 

would be somewhat unusual in a patient of this age.  Clinical correlation is 

needed to determine if evaluation of the marrow is indicated."





He reports a boil on his left lower quadrant and using a needle at home to husam

 it, I asked him if he used anything to clean the needle and he said warm water.

 Today, it is not warm or erythematous, and there was no appreciable drainage 

when I change his dressing at 1421. It is about a quarter of an inch flesh wound

 of no significant depth.





Plan:


CBC daily.














(6) COVID-19


Impression: 


He was COVID-19 positive at his last admission a month ago. His chest x-ray was 

abnormal. His COVID- test is once again positive.


His chest x-ray shows right lateral atelectasis and/or infiltrate. 





Plan: 


Continue isolation precautions.


Monitor for worsening symptoms.











(7) Hallucinations


Impression: 


He denies any hallucinations, SI, thoughts of self-harm, or HI today, but 

appears tense and anxious. He is also fixated on going home. I had several 

conversations with him highlighting the importance of him staying in the 

hospital for longer in order for his electrolytes to be corrected - he was 

always agreeable and stated that he is amenable to staying after each 

conversation, but would persevere with the fixation on going home shortly after.





He had a telehealth psychiatric evaluation today with Dr Lawrence Dickson. He 

elaborated on his hallucinations, he sees writing on surfaces, such as walls, 

floors, and other people's skin (namely his parents). He also reports that he 

knows who is doing this and that he would kill them if they broke into his house

 - but has no plan or HI otherwise. Per Dr Dickson, Mr Winters did not report SI 

or thoughts of self-harm. His fund of knowledge is appropriate for socioeconomic

 status and level of education. His memory is intact, he lacks insight into his 

problems and fails to recognize the benefits of treatment, he also shows severe 

impairment in judgment, impaired decision making, impaired self-care, and 

impaired treatment compliance. Mr Winters was found to be psychotic and 

depressed at the time of the psychiatric consult and was prescribed 5mg of 

abilify for psychosis. It has been advised to us by Dr Dickson that Mr Winters 

should undergo psychiatric evaluation should he wish to leave AMA to be 

evaluated for possible involuntary hold. 





Plan:


Start abilfy 5 mg daily for psychosis.


Follow up with psychiatry (Dr Dickson, telehealth) in two days (10/14) in order to

 assess/adjust medications.

## 2022-10-12 NOTE — TELEPSYCH PHYS NOTE
Telepsych Note





- CHIEF COMPLAINT/HX OF PRESENT ILLNESS


Chief Complaint and History of Present Illness: 








Name: Juan MichelleOB: 1990





DateandTime: 10/12/2022 3:44:57 PM





Location of the patient[facility] EDLocation of the doctor: SANDEEP Espino





Length of consult: 60 min








This evaluation was conducted via video telepsychiatry with the assistance of 

onsite staff





Reason for consult: psychosis - medication recs





Requested by: Jaclyn Franklin MD





History of Present Illness: 31 y/o male, lives with parents, on SSI for medical,

h/o depression, h/o inpatient x1 about 2 years ago for SI, no reported h/o 

attempts, daily THC use, currently admitted for hypokalemia related to non-

compliance with meds for Gitelman syndrome who has been reporting diverse 

psychotic symptoms including tactile, visual, and auditory hallucinations and 

paranoid delusions. Patient reports symptoms have been occuring for about 1 

year. Reports he sees writing in his home, on his parents, and when he's out 

driving. Reports he hears AH that tell him who is doing this and that he would 

hurt them if they came to his home but would not seek them out. Patient reports 

he believes that people are coming into his home and were doing things to his 

car. Patient is guarded and vague about details of delusions, AH, and writing 

that he sees. Reports he is willing to take Abilfy for the hallucinations. Spoke

with Dr. Fenton who reports patient is well known to hospital for medical 

complications due to noncompliance with treatment and that patient has a h/o 

becoming angry and signing out AMA. Reports during patient's last hospitaliation

was the first time he reported AH to staff and that patient was very different, 

tearful, and labile. Reports she feels patient should be medically inpatient for

3-4 days. Discussed that patient may need to be evaluated for capacity if he 

wants to sign out AMA and also that patient should be evaluated by psychiatry 

prior to discharge regarding need for admission and whether [facility] should be 

callled.





Collateral Contacted: Raul for not contacting the collateral:OtherOther:

not needed at this time





Sleep issues?: No





Psychiatric History/Treatment History:





Past diagnoses: psychosis, depression





Hospitalizations: YesDescription:depression and SI about 2-3 years





Current Treatment:No








Suicide Assessment:








PSS-3:








1) Over the past 2 weeks have you felt down, depressed or hopeless?No


2) Over the past 2 weeks have you had thoughts of killing yourself?No


3) Have you ever in your life attempted to kill yourself?No


Within the past 6 months?








[facility] Safety Assessment:





Risk Factors





Stressors: medical








Attempts/Self-injury: No








Impulsivity:YesDescription:excessive spending








Drug/Alcohol History:YesDescription:daily THC








Trauma History:No








Access to firearms:No








HI/Violence/Property destruction:YesDescription:has thoughts of hurting the 

people he thinks are doing this








Legal: No








Family Psych History:YesDescription:father - depression








   Family History of suicide:No





Protective Factors:





   Can handle stress well?No 





   Jew?No 





External:





   Social supports/ Therapeutic relationships: YesDescription:parents





   Relationship history: none current





   Living situation: living with parents





   Employment: No





   Education: 9th - went to work with father - "I dropped out...something I did"





   Responsibility to family/children/work: No





   Future orientation:No





Health History:





   Medical History: Gitelman's syndrome, hypokalemia





   Medications & Freq: no psych meds





   Allergies: NKDA





Mental Status Exam:





Appearance and Attire:Good eye contact





Psychomotor agitation:No abnormality





Attitude and behavior:Guarded, Suspicious





Speech:No abnormality,





Mood:Anxious





Affect:Flat





Thought process:Vague





Thought content:No suicidal ideation, No homicidal ideation, Paranoia, 

Delusions, Ideas of persecution, Poverty of content, Ideas of reference





Perception:Auditory hallucinations, Visual hallucinations, Tactile 

hallucinations





Intel:Average





Abstract:[place]





Language:No abnormality





Orientation:Grossly oriented





Sense:Distractible





Knowledge:Appropriate for education and socioeconomic status





Memory:Intact





Insight:Lack of awareness of problems, Failure to recognize benefits of 

treatment, Lack of motivation to change health risk behaviors, Moderate 

impairment





Judgement:Severe impairment, Impaired in response and decision making, Impaired

in self care, Impaired in treatment compliance





Gait:Not assessed





Impression/Risk Assessment:





Current Suicide Risk Elevated?No 





Current Violence Risk Elevated?Yes Description:reporting HI towards people 

he believes are breaking into his home and writing things





Issues with ability to care for self?Yes 





Summary: 31 y/o male, lives with parents, on SSI for medical, h/o depression, 

h/o inpatient x1 about 2 years ago for SI, no reported h/o attempts, daily THC 

use, currently admitted for hypokalemia related to non-compliance with meds for 

Gitelman syndrome who has been reporting diverse psychotic symptoms including 

tactile, visual, and auditory hallucinations and paranoid delusions. Psychosis 

may be related to Gitelman's syndrome based on case report. Patient appears to 

gravely disabled by his mental illness to the point of him being unable to 

manage his medical care. Patient may also be at risk to harm others due to 

delusions that people he knows of are breaking into his home and statements that

he would harm them if he came face-to-face with them. Patient would benefit from

inpatient hospitalization for stabilization and safety. At this time patient is 

not voluntary for inpatient but is willing to take medication and to stay in the

hospital for medical treatment. If patient wants to leave AMA would recommend 

reassess for capacity and whether to call [facility] for possible IVC admission.





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 

physiological condition





CPT Codes: 56835 - Psychiatric Diagnostic Evaluation with Medical Services





Treatment Plan:





   General: medical admission - patient will need outpatient psychiatric follow 

up if discharged but should not be discharged without reassessment by psychiatry

regarding possible inpatient admission or capacity to sign out AMA





   Level of Care: Medical inpatient





   Psychiatric Clearance: No





   Observation level  1:1 needed?: No





   Pharmacological: start Abilify 5mg daily for psychosis





   Patient psychotic?YesWas a standing psychotic ordered?YesDescription:





   Therapy: supportive





   Follow up needed while in the hospital?: YesNumber of times:in 2 days to 

assess/adjust medications





   Discussed plan with onsite team member: Yes Who Dr. Fenton





   Other: discussed recommendation to not smoke THC with psychosis








Lawrence Dickson MD/PhD








- PSYCHIATRIC HX/TREATMENT HX


Psychiatric: Depression, Anxiety





- MEDICAL HX


Does the pt have a hx of MRSA?: No


Neurological History: Other


Eyes, Ears, Nose, Throat: None


Cardiovascular: None


Respiratory: None


Skin: None, Other


Endocrine/Autoimmune: None


Gastrointestinal: None


Urinary: None, Other (Gettelman's syndrome)


Musculoskeletal: None


Blood Disorders: None


PMH Other: covid hx one month ago





- HOME MEDICATIONS


Home Meds (as last confirmed): 





                                 Patient History











 Medication  Instructions  Recorded  Confirmed


 


Spironolactone [Aldactone] 12.5 mg PO BID 10/10/22 10/10/22














- ALLERGIES


Allergies (as last confirmed): 





                                    Allergies











Allergy/AdvReac Type Severity Reaction Status Date / Time


 


No Known Drug Allergies Allergy   Verified 10/10/22 11:14














- TIME SPENT & PROVIDER LOCATION


Telepsych consultation conducted via videoconferencing: Yes


List names and roles of persons who participated in consult: Lawrence Dickson MD -

psychiatrist


Telepsych Provider Location: [place], [place]


Time Telepsych consult began: 13:00


Time Telepsych consult completed: 14:00

## 2022-10-13 LAB
ANION GAP SERPL CALCULATED.4IONS-SCNC: 11 MMOL/L (ref 6–13)
BASOPHILS # BLD MANUAL: 0 10^3/UL (ref 0–0.1)
BASOPHILS NFR BLD AUTO: 0.1 %
BUN SERPL-MCNC: 13 MG/DL (ref 6–20)
CALCIUM UR-MCNC: 7.9 MG/DL (ref 8.5–10.3)
CHLORIDE SERPL-SCNC: 111 MMOL/L (ref 101–111)
CO2 SERPL-SCNC: 16 MMOL/L (ref 21–32)
CREAT SERPLBLD-SCNC: 1.7 MG/DL (ref 0.6–1.2)
EOSINOPHIL # BLD MANUAL: 0.2 10^3/UL (ref 0–0.7)
EOSINOPHIL NFR BLD AUTO: 1.6 %
ERYTHROCYTE [DISTWIDTH] IN BLOOD BY AUTOMATED COUNT: 14.9 % (ref 12–15)
GFRSERPLBLD MDRD-ARVRAT: 46 ML/MIN/{1.73_M2} (ref 89–?)
GLUCOSE SERPL-MCNC: 89 MG/DL (ref 70–100)
HCT VFR BLD AUTO: 45.2 % (ref 42–52)
HGB UR QL STRIP: 15.6 G/DL (ref 14–18)
LYMPH ABN NFR BLD MANUAL: 0 %
LYMPHOBLASTS # BLD: 23 %
LYMPHOCYTES # BLD MANUAL: 3.6 10^3/UL (ref 1.5–3.5)
LYMPHOCYTES NFR BLD AUTO: 20.6 %
MANUAL DIF COMMENT BLD-IMP: (no result)
MCH RBC QN AUTO: 29.8 PG (ref 27–31)
MCHC RBC AUTO-ENTMCNC: 34.5 G/DL (ref 32–36)
MCV RBC AUTO: 86.3 FL (ref 80–94)
METAMYELOCYTES NFR BLD: 1 %
MONOCYTES # BLD MANUAL: 0.8 10^3/UL (ref 0–1)
MONOCYTES NFR BLD AUTO: 5.2 %
MYELOCYTES NFR BLD: 5 %
NEUTROPHILS # SNV AUTO: 15.8 X10^3/UL (ref 4.8–10.8)
NEUTROPHILS NFR BLD AUTO: 61.6 %
NEUTROPHILS NFR BLD MANUAL: 10.1 10^3/UL (ref 1.5–6.6)
NEUTS BAND NFR BLD: 2 %
PDW BLD AUTO: 8.2 FL (ref 7.4–11.4)
PLAT MORPH BLD: (no result)
PLATELET # BLD: 401 10^3/UL (ref 130–450)
PLATELET BLD QL SMEAR: (no result)
POTASSIUM SERPL-SCNC: 2.6 MMOL/L (ref 3.5–5)
PROMYELOCYTES NFR BLD: 1 %
RBC MAR: 5.24 10^6/UL (ref 4.7–6.1)
RBC MORPH BLD: (no result)
SODIUM SERPLBLD-SCNC: 138 MMOL/L (ref 135–145)
WBC MORPH BLD: (no result)

## 2022-10-13 RX ADMIN — SODIUM CHLORIDE, PRESERVATIVE FREE SCH ML: 5 INJECTION INTRAVENOUS at 09:10

## 2022-10-13 RX ADMIN — POTASSIUM CHLORIDE SCH MEQ: 1500 TABLET, EXTENDED RELEASE ORAL at 04:00

## 2022-10-13 RX ADMIN — SODIUM CHLORIDE SCH MLS/HR: 9 INJECTION, SOLUTION INTRAVENOUS at 12:43

## 2022-10-13 RX ADMIN — POTASSIUM CHLORIDE SCH MEQ: 20 SOLUTION ORAL at 21:31

## 2022-10-13 RX ADMIN — POTASSIUM CHLORIDE SCH MEQ: 20 SOLUTION ORAL at 16:13

## 2022-10-13 RX ADMIN — SODIUM CHLORIDE SCH MLS/HR: 9 INJECTION, SOLUTION INTRAVENOUS at 13:46

## 2022-10-13 RX ADMIN — SODIUM CHLORIDE SCH MLS/HR: 9 INJECTION, SOLUTION INTRAVENOUS at 16:12

## 2022-10-13 RX ADMIN — SODIUM CHLORIDE SCH MLS/HR: 9 INJECTION, SOLUTION INTRAVENOUS at 14:58

## 2022-10-13 RX ADMIN — SODIUM CHLORIDE PRN MLS/HR: 9 INJECTION, SOLUTION INTRAVENOUS at 13:46

## 2022-10-13 RX ADMIN — SODIUM CHLORIDE SCH MLS/HR: 9 INJECTION, SOLUTION INTRAVENOUS at 11:40

## 2022-10-13 RX ADMIN — SODIUM CHLORIDE SCH MLS/HR: 9 INJECTION, SOLUTION INTRAVENOUS at 18:17

## 2022-10-13 RX ADMIN — POTASSIUM CHLORIDE SCH MEQ: 20 SOLUTION ORAL at 10:11

## 2022-10-13 RX ADMIN — SODIUM CHLORIDE, PRESERVATIVE FREE SCH ML: 5 INJECTION INTRAVENOUS at 17:13

## 2022-10-13 RX ADMIN — SODIUM CHLORIDE SCH MLS/HR: 9 INJECTION, SOLUTION INTRAVENOUS at 17:13

## 2022-10-13 RX ADMIN — SODIUM CHLORIDE PRN MLS/HR: 9 INJECTION, SOLUTION INTRAVENOUS at 11:40

## 2022-10-13 RX ADMIN — SODIUM CHLORIDE SCH MLS/HR: 9 INJECTION, SOLUTION INTRAVENOUS at 09:07

## 2022-10-13 NOTE — PROVIDER PROGRESS NOTE
Subjective





- Prog Note Date


Prog Note Date: 10/13/22


Prog Note Time: 08:21





- Subjective


Pt reports feeling: No change (Feels the same as yesterday.)





Current Medications





- Current Medications


Current Medications: 





Active Medications





Acetaminophen (Acetaminophen 325 Mg Tablet)  650 mg PO Q4HR PRN


   PRN Reason: Pain or Fever > 38C (100.4F)


   Last Admin: 10/11/22 14:18 Dose:  650 mg


   


Aripiprazole (Aripiprazole 5 Mg Tablet)  5 mg PO DAILY [facility]


Bacitracin (Bacitracin Zinc Oint 1 Packet)  1 packet TOP PRN PRN


   PRN Reason: Skin Care


   Last Admin: 10/11/22 10:00 Dose:  1 packet


   


Potassium Chloride 10 meq/ (Sodium Chloride)  105 mls @ 105 mls/hr IV .Q1H [facility]


   Stop: 10/13/22 16:59


Ondansetron HCl (Ondansetron 4 Mg/2 Ml Vial)  4 mg IVP Q6HR PRN


   PRN Reason: Nausea / Vomiting


Potassium Chloride (Potassium Chloride 20 Meq/15 Ml Udc)  80 meq PO Q6H [facility]


Sodium Chloride (Sodium Chloride Flush 0.9% 10 Ml Syringe)  10 ml IVP 

0100,0900,1700 [facility]


   Last Admin: 10/12/22 23:26 Dose:  Not Given


   


Sodium Chloride (Sodium Chloride Flush 0.9% 10 Ml Syringe)  10 ml IVP PRN PRN


   PRN Reason: AS NEEDED PER PROVIDER ORDERS





                                        





Spironolactone [Aldactone] 12.5 mg PO BID 10/10/22 











Objective





- Vital Signs/Intake & Output


Reviewed Vital Signs: Yes


Vital Signs: 


                                Vital Signs x48h











  Temp Pulse Resp BP Pulse Ox


 


 10/13/22 07:11  36.6 C  82  19  110/55 L  100


 


 10/13/22 05:56  36.6 C  77  18  98/67  100











Intake & Output: 


                                 Intake & Output











 10/10/22 10/11/22 10/12/22 10/13/22





 23:59 23:59 23:59 23:59


 


Intake Total 2697.5 8447.50 3749 100


 


Output Total 2350 7550 1100 


 


Balance 347.5 897.50 2649 100














- Objective


General Appearance: positive: No acute distress


Eyes Bilateral: positive: Normal inspection


ENT: positive: ENT inspection nml


Neck: positive: Nml inspection, No JVD, Trachea midline.  negative: 

Lymphadenopathy (R), Lymphadenopathy (L), Stiff neck


Respiratory: positive: Chest non-tender, No respiratory distress, Breath sounds 

nml


Cardiovascular: positive: Regular rate & rhythm


Abdomen: positive: Non-tender


Back: positive: Nml inspection


Skin: positive: Color nml


Neurologic/Psychiatric: positive: Oriented x3





- Lab Results


Fish Bones: 


                                 10/13/22 05:58





                                 10/13/22 05:58


Other Labs: 


                               Lab Results x24hrs











  10/13/22 10/13/22 Range/Units





  05:58 05:58 


 


WBC   15.8 H  (4.8-10.8)  x10^3/uL


 


RBC   5.24  (4.70-6.10)  10^6/uL


 


Hgb   15.6  (14.0-18.0)  g/dL


 


Hct   45.2  (42.0-52.0)  %


 


MCV   86.3  (80.0-94.0)  fL


 


MCH   29.8  (27.0-31.0)  pg


 


MCHC   34.5  (32.0-36.0)  g/dL


 


RDW   14.9  (12.0-15.0)  %


 


Plt Count   401  (130-450)  10^3/uL


 


MPV   8.2  (7.4-11.4)  fL


 


Neut # (Auto)   Not Reportable  


 


Lymph # (Auto)   Not Reportable  


 


Mono # (Auto)   Not Reportable  


 


Eos # (Auto)   Not Reportable  


 


Baso # (Auto)   Not Reportable  


 


Absolute Nucleated RBC   Not Reportable  


 


Total Counted   100  


 


Band Neuts % (Manual)   2 (0 - 10) %


 


Abnorm Lymph % (Manual)   0  %


 


Metamyelocytes %   1 H ( - 0) %


 


Myelocytes %   5 H ( - 0) %


 


Promyelocytes %   1 H ( - 0) %


 


Nucleated RBC %   Not Reportable  


 


Neutrophils # (Manual)   10.1 H  (1.5-6.6)  10^3/uL


 


Lymphocytes # (Manual)   3.6 H  (1.5-3.5)  10^3/uL


 


Monocytes # (Manual)   0.8  (0.0-1.0)  10^3/uL


 


Eosinophils # (Manual)   0.2  (0-0.7)  10^3/uL


 


Basophils # (Manual)   0.0  (0-0.1)  10^3/uL


 


Differential Comment   MANUAL DIFFERENTIAL  


 


WBC Morphology   NORMAL APPEARANCE  (NORMAL)  


 


Platelet Estimate   NORMAL (130-450,000)  (NORMAL)  


 


Platelet Morphology   NORMAL APPEARANCE  (NORMAL)  


 


RBC Morph Micro Appear   NORMAL APPEARANCE  (NORMAL)  


 


Sodium  138   (135-145)  mmol/L


 


Potassium  2.6 L   (3.5-5.0)  mmol/L


 


Chloride  111   (101-111)  mmol/L


 


Carbon Dioxide  16 L   (21-32)  mmol/L


 


Anion Gap  11.0   (6-13)  


 


BUN  13   (6-20)  mg/dL


 


Creatinine  1.7 H   (0.6-1.2)  mg/dL


 


Estimated GFR (MDRD)  46 L   (>89)  


 


Glucose  89   ()  mg/dL


 


Calcium  7.9 L   (8.5-10.3)  mg/dL














ABX Reporting


Has patient been on IV antibiotics over the past 48 hours?: No





Assessment/Plan





- Problem List


(1) Hypokalemia


Impression: 


Due to Gitelman syndrome, today it is still low at 2.6 up from 2.3 yesterday. Pt

 has a history of noncompliance and leaving AMA before his potassium is back to 

normal limits. I had a good conversation with Mr Winters about the importance of

 trying to stay for as long as he could, so that his electrolytes could be 

optimized. He seemed receptive to what I had to say. 





Plan:


Continue telemetry and rapid electrolyte replacement with both IV and oral 

potassium (oral 80meq, IV 40meq in sodium chloride).


Daily chemistry.








(2) Hyponatremia syndrome


Impression: 


Resolved, sodium concentration was 138 today.





Plan:


Free water restriction raised to 1L.


Continue to monitor sodium and adjust as needed.











(3) Acute kidney injury


Impression: 


Acute kidney injury  is present, his hemoglobin, creatinine, and BUN are still 

elevated but appear to be in a consistend downward trend.





Plan:


Continue IV saline with potassium at 100mL/hour. 


Avoid nephrotoxins.


Continue to monitor BMP every 6 hours.








(4) Gitelman syndrome


Impression: 


RTA Type II, as per Hx.





Plan:


Monitor and replace electrolytes as needed.








(5) Leukocytosis


Impression: 


He had leukocytosis during his last admission a month ago, but at the time this 

was attributed to an abscess that required draining. Overall, his WBC count is 

on a consistent downward trend, he is afebrile, and not hypotensive. 





With his last admission there was a pathology review for his cells.  "Review of 

the peripheral blood smear collection reveals elevated absolute erythrocyte 

count, as well as leukocytosis.  The leukocytes are predominantly comprised of 

mature neutrophils.  Other than confirmation of the CBC indices, no additional 

diagnostic alterations are identified by microscopic examination of the 

peripheral blood.  If a clinical explanation for the findings is not apparent, 

the possibility of a myeloproliferative disorder could be considered, although 

would be somewhat unusual in a patient of this age.  Clinical correlation is 

needed to determine if evaluation of the marrow is indicated."





He reports a boil on his left lower quadrant and using a needle at home to husam

 it, I asked him if he used anything to clean the needle and he said warm water.

 Today, it is not warm or erythematous, and there was no appreciable drainage. 





Plan:


Ordered CBC for 10/14.








(6) COVID-19


Impression: 


He was COVID-19 positive at his last admission a month ago. His chest x-ray was 

abnormal. His COVID- test is once again positive.


His chest x-ray shows right lateral atelectasis and/or infiltrate. He appears 

completely asymptomatic.





Plan: 


Continue isolation precautions.


Monitor for worsening symptoms.








(7) Hallucinations


Impression: 


He denies any hallucinations, SI, thoughts of self-harm, or HI today, but 

appears tense and anxious. He is also fixated on going home. I had several 

conversations with him highlighting the importance of him staying in the 

hospital for longer in order for his electrolytes to be corrected - he was 

always agreeable and stated that he is amenable to staying after each 

conversation, but would persevere with the fixation on going home shortly after.





He had a telehealth psychiatric evaluation on 10/12 with Dr Lawrence Dickson. He 

elaborated on his hallucinations, he sees writing on surfaces, such as walls, 

floors, and other people's skin (namely his parents). He also reports that he 

knows who is doing this and that he would kill them if they broke into his house

 - but has no plan or HI otherwise. Per Dr Dickson, Mr Winters did not report SI 

or thoughts of self-harm. His fund of knowledge is appropriate for socioeconomic

 status and level of education. His memory is intact, he lacks insight into his 

problems and fails to recognize the benefits of treatment, he also shows severe 

impairment in judgment, impaired decision making, impaired self-care, and 

impaired treatment compliance. Mr Winters was found to be psychotic and 

depressed at the time of the psychiatric consult and was prescribed 5mg of 

abilify for psychosis. It has been advised to us by Dr Dcikson that Mr Winters 

should undergo psychiatric evaluation should he wish to leave AMA to be 

evaluated for possible involuntary hold (he needs to be medically cleared for 

this to happen.).





He is tolerating abilify well so far (day 2). 





Plan:


Abilfy 5 mg daily for psychosis.


Follow up with psychiatry (Dr Dickson, telehealth) in two days (10/14) in order to

 assess/adjust medications.

## 2022-10-14 VITALS — DIASTOLIC BLOOD PRESSURE: 63 MMHG | SYSTOLIC BLOOD PRESSURE: 106 MMHG

## 2022-10-14 LAB
AMPHET UR QL SCN: NEGATIVE
ANION GAP SERPL CALCULATED.4IONS-SCNC: 12 MMOL/L (ref 6–13)
BARBITURATES UR QL SCN>300 NG/ML: NEGATIVE
BENZODIAZ UR QL SCN: NEGATIVE
BUN SERPL-MCNC: 12 MG/DL (ref 6–20)
CALCIUM UR-MCNC: 8 MG/DL (ref 8.5–10.3)
CHLORIDE SERPL-SCNC: 106 MMOL/L (ref 101–111)
CO2 SERPL-SCNC: 16 MMOL/L (ref 21–32)
COCAINE UR-SCNC: NEGATIVE UMOL/L
CREAT SERPLBLD-SCNC: 1.6 MG/DL (ref 0.6–1.2)
GFRSERPLBLD MDRD-ARVRAT: 50 ML/MIN/{1.73_M2} (ref 89–?)
GLUCOSE SERPL-MCNC: 139 MG/DL (ref 70–100)
METHADONE UR QL SCN: NEGATIVE
METHAMPHET UR QL SCN: NEGATIVE
OPIATES UR QL SCN: NEGATIVE
POTASSIUM SERPL-SCNC: 2.8 MMOL/L (ref 3.5–5)
SODIUM SERPLBLD-SCNC: 134 MMOL/L (ref 135–145)
THC UR QL SCN: NEGATIVE
VOLATILE DRUGS POS SERPL SCN: (no result)

## 2022-10-14 RX ADMIN — SODIUM CHLORIDE, PRESERVATIVE FREE SCH ML: 5 INJECTION INTRAVENOUS at 01:20

## 2022-10-14 RX ADMIN — POTASSIUM CHLORIDE SCH MEQ: 20 SOLUTION ORAL at 04:13

## 2022-10-14 RX ADMIN — POTASSIUM CHLORIDE SCH MEQ: 20 SOLUTION ORAL at 10:13

## 2022-10-14 RX ADMIN — SODIUM CHLORIDE, PRESERVATIVE FREE SCH ML: 5 INJECTION INTRAVENOUS at 08:22

## 2022-10-14 NOTE — DISCHARGE SUMMARY
Discharge Summary


Admit Date: 10/10/22


Discharge Date: 10/14/22


Discharging Provider: Radhika Fenton MD


Primary Care Provider: No PCP


Code Status: Attempt Resuscitation


Condition at Discharge: Fair


Discharge Disposition: 07 Against Medical Advice





- DIAGNOSES


Discharge Diagnoses with Status of Each Condition: 


1.  Hypokalemia


2.  Gettleman syndrome


3.  Hyponatremia


4.  Acute kidney injury, resolved


5.  Chronic leukocytosis


6.  COVID-19 positive


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


8.  Depression with anxiety


9.  Substance abuse history with THC and ecstasy








- HPI


History of Present Illness: 


This is a 32-year-old male who has a history of inherited kidney disorder called

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  He is on a high-dose of

daily potassium replacement 80 mEq p.o. 4 times daily.  He is well-known to our 

service since he needs admission for replacing potassium, the last admission was

1 month ago. He also has a recent new history of auditory and visual 

hallucinations on the admission 1 month ago. He has a long history of 

noncompliance. Leaving AMA.  


Patient stopped taking his potassium and said the reason was because he had "a 

lot going on".  By this he means that he was worried about what people would 

think about him having continued visual and auditory hallucinations.  He did not

see a counselor or any provider after that admission in September when he 

reported the same symptoms.  He is not suicidal.  He started to feel weak today 

and came to the ED and found to have a potassium level that was less than 1.5 

(not measurable).  He also has a sodium 116, white blood count 48.  Patient 

tested positive for COVID on the admission a month ago and again pos today.  He 

said he never had a cough or any shortness of breath.  He does have intermittent

diarrhea and had his last episode of diarrhea last night.  There has been no fev

er.  His appetite is okay.





The patient is being admitted to the Hospitalist service in the ICU for 

management of his severe electrolyte abnormalities.





- Past Medical History


Cardiovascular: reports: None


Respiratory: reports: None


Endocrine/Autoimmune: reports: None


GI: reports: None


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


: reports: None, Other (Gettelman's syndrome)


HEENT: reports: None


Psych: reports: Depression, Anxiety


Musculoskeletal: reports: None


Derm: reports: None, Other


MRSA Hx?: No








- CONSULTS | PROCEDURES


Consultations: Telepsychiatry consultation with Dr. Lawrence Dickson


Procedures: 


Chest x-ray with low lung volumes, right lateral atelectasis and/or infiltrate








- HOSPITAL COURSE


Hospital Course: 


In comparison to his last hospitalization, Mr. Winters is much more calm from an

emotional perspective.  With the last hospitalization he was inconsolable at 

times, sobbing, with many self recrimination's.  He would state that he did not 

know that what he did was wrong.  That he had hoped that people would bring him 

upright and tell him what was right and wrong.  Was not really his fault if he 

was never told it was wrong.  But we never figured out what he was talking 

about.  He was also paranoid.  Convinced that water was being poisoned in his 

home and that sometimes we were poisoning him in the hospital.





He returns to us after just not taking his medicines.  He says "I just did not 

feel good and I got tired and did not want to take them anymore."  We were able 

to supplement his potassium on a regular basis with IV and p.o.  This is the 

longest he stayed with us on a voluntary basis.  We were able to get a telepsych

consult and the Consultant felt that the patient was danger to himself because 

of his hallucinations.  He was delusional and not understanding what he needed 

to do to take his medicines. He recommended an involuntary hold.  Social 

work/discharge planning spoke to the DCR the day he said he was leaving A.  

Please refer to their notes.  The patient is not able to be involuntarily 

detained.  He left as he usually does, AGAINST MEDICAL ADVICE.  He is now on 

Abilify which is a new medication for him.





At discharge temperature is 36.4.  Heart rate 97.  Blood pressure 106/63.  

Respirations 18.  100% on room air.  He is 5 foot 8-1/2 inches tall, 71 kg.  A 

tanned  male.  Shotty neck adenopathy.  Lungs clear.  No 

increased respiratory effort.  No respiratory distress.  Regular rate and 

rhythm.  Abdomen soft and nontender.  Extremities without edema.





Our concerns during his hospitalization included his sodium.  We keep on 

dropping his sodium when we give him potassium replacement and free water.  As 

such we changed his potassium replacement to normal saline bags.  Sodium 

responded to 138 from 119 once we did that.  He also has a chronic leukocytosis 

that we cannot explain.  He does have a small ingrown hair in his left lower q

uadrant but that is not enough to cause this.  We noticed that he has had 

leukocytosis for many years now.  A pathology slide smear from last visit shows 

no abnormalities to the white cell count.  If he can establish himself with a 

primary care provider he may need a bone marrow biopsy.  White cell count was 

15.8 the day before discharge.  It has many metamyelocytes, myelocytes and 

promyelocytes.  No lymphocytosis.  He is acute kidney injury improved 

tremendously but was not back to baseline by the time he left.  He was supposed 

of had another telemedicine consultation on the day of discharge but he left 

[facility].  Psychiatry wanted to see how he was doing with his Abilify.





Greater than 30 minutes was spent coordinating discharge








- ALLERGIES


Allergies/Adverse Reactions: 


                                    Allergies











Allergy/AdvReac Type Severity Reaction Status Date / Time


 


No Known Drug Allergies Allergy   Verified 10/10/22 11:14














- MEDICATIONS


Home Medications: 


                                Ambulatory Orders











 Medication  Instructions  Recorded  Confirmed


 


Potassium Chloride [Klor-Con] 80 meq PO QID 30 Days #480 ea 06/09/22 10/10/22


 


Spironolactone [Aldactone] 12.5 mg PO BID 10/10/22 10/10/22


 


ARIPiprazole [Abilify] 5 mg PO DAILY #30 tablet 10/14/22 














- LABS


Result Diagrams: 


                                 10/13/22 05:58





                                 10/14/22 09:24

## 2022-10-14 NOTE — DISCHARGE PLAN
Discharge Plan


Problem Reviewed?: Yes


Disposition: 07 Against Medical Advice


Condition: Fair


Diet: Regular


Activity Restrictions: Activity as Tolerated


Shower Restrictions: No


Driving Restrictions: Yes (no driving)


No Smoking: If you smoke, Please STOP!  Call [phone] for help.

## 2023-03-14 ENCOUNTER — HOSPITAL ENCOUNTER (OUTPATIENT)
Dept: HOSPITAL 76 - EMS | Age: 33
Discharge: ANOTHER HOSPITAL | End: 2023-03-14
Payer: MEDICAID

## 2023-03-14 ENCOUNTER — HOSPITAL ENCOUNTER (INPATIENT)
Dept: HOSPITAL 76 - ED | Age: 33
LOS: 10 days | Discharge: HOME | DRG: 640 | End: 2023-03-24
Attending: INTERNAL MEDICINE | Admitting: INTERNAL MEDICINE
Payer: MEDICAID

## 2023-03-14 DIAGNOSIS — R26.2: ICD-10-CM

## 2023-03-14 DIAGNOSIS — I44.2: ICD-10-CM

## 2023-03-14 DIAGNOSIS — N28.89: ICD-10-CM

## 2023-03-14 DIAGNOSIS — E43: ICD-10-CM

## 2023-03-14 DIAGNOSIS — G47.00: ICD-10-CM

## 2023-03-14 DIAGNOSIS — F32.A: ICD-10-CM

## 2023-03-14 DIAGNOSIS — N17.9: ICD-10-CM

## 2023-03-14 DIAGNOSIS — Z56.0: ICD-10-CM

## 2023-03-14 DIAGNOSIS — F41.9: ICD-10-CM

## 2023-03-14 DIAGNOSIS — R53.1: Primary | ICD-10-CM

## 2023-03-14 DIAGNOSIS — F23: ICD-10-CM

## 2023-03-14 DIAGNOSIS — H53.8: ICD-10-CM

## 2023-03-14 DIAGNOSIS — E83.39: ICD-10-CM

## 2023-03-14 DIAGNOSIS — N18.9: ICD-10-CM

## 2023-03-14 DIAGNOSIS — T50.3X6A: ICD-10-CM

## 2023-03-14 DIAGNOSIS — R42: ICD-10-CM

## 2023-03-14 DIAGNOSIS — D72.829: ICD-10-CM

## 2023-03-14 DIAGNOSIS — R63.8: ICD-10-CM

## 2023-03-14 DIAGNOSIS — Z20.822: ICD-10-CM

## 2023-03-14 DIAGNOSIS — Z91.199: ICD-10-CM

## 2023-03-14 DIAGNOSIS — Z91.128: ICD-10-CM

## 2023-03-14 DIAGNOSIS — E86.0: ICD-10-CM

## 2023-03-14 DIAGNOSIS — Z91.14: ICD-10-CM

## 2023-03-14 DIAGNOSIS — F17.200: ICD-10-CM

## 2023-03-14 DIAGNOSIS — Z79.899: ICD-10-CM

## 2023-03-14 DIAGNOSIS — E87.6: Primary | ICD-10-CM

## 2023-03-14 PROCEDURE — 87086 URINE CULTURE/COLONY COUNT: CPT

## 2023-03-14 PROCEDURE — 82570 ASSAY OF URINE CREATININE: CPT

## 2023-03-14 PROCEDURE — 84443 ASSAY THYROID STIM HORMONE: CPT

## 2023-03-14 PROCEDURE — 84133 ASSAY OF URINE POTASSIUM: CPT

## 2023-03-14 PROCEDURE — 87150 DNA/RNA AMPLIFIED PROBE: CPT

## 2023-03-14 PROCEDURE — 82310 ASSAY OF CALCIUM: CPT

## 2023-03-14 PROCEDURE — 93005 ELECTROCARDIOGRAM TRACING: CPT

## 2023-03-14 PROCEDURE — 80053 COMPREHEN METABOLIC PANEL: CPT

## 2023-03-14 PROCEDURE — 83690 ASSAY OF LIPASE: CPT

## 2023-03-14 PROCEDURE — 81001 URINALYSIS AUTO W/SCOPE: CPT

## 2023-03-14 PROCEDURE — 84100 ASSAY OF PHOSPHORUS: CPT

## 2023-03-14 PROCEDURE — 82533 TOTAL CORTISOL: CPT

## 2023-03-14 PROCEDURE — 87635 SARS-COV-2 COVID-19 AMP PRB: CPT

## 2023-03-14 PROCEDURE — 82397 CHEMILUMINESCENT ASSAY: CPT

## 2023-03-14 PROCEDURE — 36415 COLL VENOUS BLD VENIPUNCTURE: CPT

## 2023-03-14 PROCEDURE — 84145 PROCALCITONIN (PCT): CPT

## 2023-03-14 PROCEDURE — 99285 EMERGENCY DEPT VISIT HI MDM: CPT

## 2023-03-14 PROCEDURE — 82140 ASSAY OF AMMONIA: CPT

## 2023-03-14 PROCEDURE — 80306 DRUG TEST PRSMV INSTRMNT: CPT

## 2023-03-14 PROCEDURE — 83935 ASSAY OF URINE OSMOLALITY: CPT

## 2023-03-14 PROCEDURE — 81003 URINALYSIS AUTO W/O SCOPE: CPT

## 2023-03-14 PROCEDURE — 83735 ASSAY OF MAGNESIUM: CPT

## 2023-03-14 PROCEDURE — 84300 ASSAY OF URINE SODIUM: CPT

## 2023-03-14 PROCEDURE — 84132 ASSAY OF SERUM POTASSIUM: CPT

## 2023-03-14 PROCEDURE — 87040 BLOOD CULTURE FOR BACTERIA: CPT

## 2023-03-14 PROCEDURE — 82436 ASSAY OF URINE CHLORIDE: CPT

## 2023-03-14 PROCEDURE — 80048 BASIC METABOLIC PNL TOTAL CA: CPT

## 2023-03-14 PROCEDURE — 80069 RENAL FUNCTION PANEL: CPT

## 2023-03-14 PROCEDURE — 85025 COMPLETE CBC W/AUTO DIFF WBC: CPT

## 2023-03-14 PROCEDURE — 82530 CORTISOL FREE: CPT

## 2023-03-14 PROCEDURE — 82330 ASSAY OF CALCIUM: CPT

## 2023-03-14 SDOH — ECONOMIC STABILITY - INCOME SECURITY: UNEMPLOYMENT, UNSPECIFIED: Z56.0

## 2023-03-15 LAB
ALBUMIN DIAFP-MCNC: 3 G/DL (ref 3.2–5.5)
ALBUMIN DIAFP-MCNC: 3.4 G/DL (ref 3.2–5.5)
ALBUMIN/GLOB SERPL: 0.7 {RATIO} (ref 1–2.2)
ALP SERPL-CCNC: 86 IU/L (ref 42–121)
ALT SERPL W P-5'-P-CCNC: 21 IU/L (ref 10–60)
AMPHET UR QL SCN: NEGATIVE
ANION GAP SERPL CALCULATED.4IONS-SCNC: 17 MMOL/L (ref 6–13)
ANION GAP SERPL CALCULATED.4IONS-SCNC: 19 MMOL/L (ref 6–13)
AST SERPL W P-5'-P-CCNC: 15 IU/L (ref 10–42)
BARBITURATES UR QL SCN>300 NG/ML: NEGATIVE
BASOPHILS # BLD MANUAL: 0 10^3/UL (ref 0–0.1)
BASOPHILS # BLD MANUAL: 0 10^3/UL (ref 0–0.1)
BASOPHILS NFR BLD AUTO: 0.2 %
BASOPHILS NFR BLD AUTO: 0.2 %
BENZODIAZ UR QL SCN: NEGATIVE
BILIRUB BLD-MCNC: 0.5 MG/DL (ref 0.2–1)
BUN SERPL-MCNC: 43 MG/DL (ref 6–20)
BUN SERPL-MCNC: 50 MG/DL (ref 6–20)
CA-I SERPL ISE-MCNC: 0.93 MMOL/L (ref 1.15–1.33)
CA-I SERPL ISE-MCNC: 1.07 MMOL/L (ref 1.15–1.33)
CALCIUM UR-MCNC: 7.2 MG/DL (ref 8.5–10.3)
CALCIUM UR-MCNC: 7.6 MG/DL (ref 8.5–10.3)
CHLORIDE SERPL-SCNC: 86 MMOL/L (ref 101–111)
CHLORIDE SERPL-SCNC: 92 MMOL/L (ref 101–111)
CLARITY UR REFRACT.AUTO: CLEAR
CO2 SERPL-SCNC: 15 MMOL/L (ref 21–32)
CO2 SERPL-SCNC: 17 MMOL/L (ref 21–32)
COCAINE UR-SCNC: NEGATIVE UMOL/L
CREAT SERPLBLD-SCNC: 2.9 MG/DL (ref 0.6–1.2)
CREAT SERPLBLD-SCNC: 3.2 MG/DL (ref 0.6–1.2)
EOSINOPHIL # BLD MANUAL: 0 10^3/UL (ref 0–0.7)
EOSINOPHIL # BLD MANUAL: 0 10^3/UL (ref 0–0.7)
EOSINOPHIL NFR BLD AUTO: 0 %
EOSINOPHIL NFR BLD AUTO: 0.1 %
ERYTHROCYTE [DISTWIDTH] IN BLOOD BY AUTOMATED COUNT: 13.5 % (ref 12–15)
ERYTHROCYTE [DISTWIDTH] IN BLOOD BY AUTOMATED COUNT: 13.5 % (ref 12–15)
GFRSERPLBLD MDRD-ARVRAT: 22 ML/MIN/{1.73_M2} (ref 89–?)
GFRSERPLBLD MDRD-ARVRAT: 25 ML/MIN/{1.73_M2} (ref 89–?)
GLOBULIN SER-MCNC: 5.1 G/DL (ref 2.1–4.2)
GLUCOSE SERPL-MCNC: 114 MG/DL (ref 70–100)
GLUCOSE SERPL-MCNC: 118 MG/DL (ref 70–100)
GLUCOSE UR QL STRIP.AUTO: NEGATIVE MG/DL
HCT VFR BLD AUTO: 41.5 % (ref 42–52)
HCT VFR BLD AUTO: 43.7 % (ref 42–52)
HGB UR QL STRIP: 16 G/DL (ref 14–18)
HGB UR QL STRIP: 16.3 G/DL (ref 14–18)
KETONES UR QL STRIP.AUTO: NEGATIVE MG/DL
LIPASE SERPL-CCNC: 576 U/L (ref 22–51)
LYMPH ABN NFR BLD MANUAL: 0 %
LYMPH ABN NFR BLD MANUAL: 0 %
LYMPHOBLASTS # BLD: 2 %
LYMPHOBLASTS # BLD: 4 %
LYMPHOCYTES # BLD MANUAL: 0.8 10^3/UL (ref 1.5–3.5)
LYMPHOCYTES # BLD MANUAL: 1.6 10^3/UL (ref 1.5–3.5)
LYMPHOCYTES NFR BLD AUTO: 1.8 %
LYMPHOCYTES NFR BLD AUTO: 2.6 %
MAGNESIUM SERPL-MCNC: 2 MG/DL (ref 1.7–2.8)
MAGNESIUM SERPL-MCNC: 2.6 MG/DL (ref 1.7–2.8)
MANUAL DIF COMMENT BLD-IMP: (no result)
MANUAL DIF COMMENT BLD-IMP: (no result)
MCH RBC QN AUTO: 29.3 PG (ref 27–31)
MCH RBC QN AUTO: 29.9 PG (ref 27–31)
MCHC RBC AUTO-ENTMCNC: 37.3 G/DL (ref 32–36)
MCHC RBC AUTO-ENTMCNC: 38.6 G/DL (ref 32–36)
MCV RBC AUTO: 77.4 FL (ref 80–94)
MCV RBC AUTO: 78.5 FL (ref 80–94)
METHADONE UR QL SCN: NEGATIVE
METHAMPHET UR QL SCN: NEGATIVE
MONOCYTES # BLD MANUAL: 1.2 10^3/UL (ref 0–1)
MONOCYTES # BLD MANUAL: 1.5 10^3/UL (ref 0–1)
MONOCYTES NFR BLD AUTO: 5.8 %
MONOCYTES NFR BLD AUTO: 6.5 %
MYELOCYTES NFR BLD: 1 %
NEUTROPHILS # SNV AUTO: 38.3 X10^3/UL (ref 4.8–10.8)
NEUTROPHILS # SNV AUTO: 39.5 X10^3/UL (ref 4.8–10.8)
NEUTROPHILS NFR BLD AUTO: 88.9 %
NEUTROPHILS NFR BLD AUTO: 90 %
NEUTROPHILS NFR BLD MANUAL: 36 10^3/UL (ref 1.5–6.6)
NEUTROPHILS NFR BLD MANUAL: 36.3 10^3/UL (ref 1.5–6.6)
NEUTS BAND NFR BLD: 0 %
NEUTS BAND NFR BLD: 2 %
NITRITE UR QL STRIP.AUTO: NEGATIVE
OPIATES UR QL SCN: NEGATIVE
PDW BLD AUTO: 8.6 FL (ref 7.4–11.4)
PDW BLD AUTO: 8.9 FL (ref 7.4–11.4)
PH BLDV: 7.25 [PH] (ref 7.31–7.41)
PH BLDV: 7.27 [PH] (ref 7.31–7.41)
PH UR STRIP.AUTO: 7 PH (ref 5–7.5)
PHOSPHATE BLD-MCNC: 6.8 MG/DL (ref 2.5–4.6)
PHOSPHATE BLD-MCNC: 7 MG/DL (ref 2.5–4.6)
PLAT MORPH BLD: (no result)
PLAT MORPH BLD: (no result)
PLATELET # BLD: 562 10^3/UL (ref 130–450)
PLATELET # BLD: 606 10^3/UL (ref 130–450)
PLATELET BLD QL SMEAR: (no result)
PLATELET BLD QL SMEAR: (no result)
POTASSIUM SERPL-SCNC: < 1.5 MMOL/L (ref 3.5–5)
POTASSIUM SERPL-SCNC: < 1.5 MMOL/L (ref 3.5–5)
PROT SPEC-MCNC: 8.4 G/DL (ref 6.7–8.2)
PROT UR STRIP.AUTO-MCNC: 100 MG/DL
RBC # UR STRIP.AUTO: (no result) /UL
RBC # URNS HPF: (no result) /HPF (ref 0–5)
RBC MAR: 5.36 10^6/UL (ref 4.7–6.1)
RBC MAR: 5.57 10^6/UL (ref 4.7–6.1)
RBC MORPH BLD: (no result)
RBC MORPH BLD: (no result)
SODIUM SERPLBLD-SCNC: 122 MMOL/L (ref 135–145)
SODIUM SERPLBLD-SCNC: 124 MMOL/L (ref 135–145)
SP GR UR STRIP.AUTO: 1.01 (ref 1–1.03)
SQUAMOUS URNS QL MICRO: (no result)
THC UR QL SCN: POSITIVE
UROBILINOGEN UR QL STRIP.AUTO: (no result) E.U./DL
UROBILINOGEN UR STRIP.AUTO-MCNC: NEGATIVE MG/DL
VOLATILE DRUGS POS SERPL SCN: (no result)
WBC # UR MANUAL: (no result) /HPF (ref 0–3)
WBC MORPH BLD: (no result)
WBC MORPH BLD: (no result)

## 2023-03-15 RX ADMIN — PANTOPRAZOLE SODIUM SCH MG: 40 TABLET, DELAYED RELEASE ORAL at 07:05

## 2023-03-15 RX ADMIN — HEPARIN SODIUM SCH UNIT: 5000 INJECTION, SOLUTION INTRAVENOUS; SUBCUTANEOUS at 08:15

## 2023-03-15 RX ADMIN — SODIUM CHLORIDE SCH MLS/HR: 9 INJECTION, SOLUTION INTRAVENOUS at 11:02

## 2023-03-15 RX ADMIN — SODIUM CHLORIDE SCH MLS/HR: 9 INJECTION, SOLUTION INTRAVENOUS at 15:09

## 2023-03-15 RX ADMIN — SODIUM CHLORIDE, PRESERVATIVE FREE SCH ML: 5 INJECTION INTRAVENOUS at 23:05

## 2023-03-15 RX ADMIN — ACETAMINOPHEN PRN MG: 325 TABLET ORAL at 15:08

## 2023-03-15 RX ADMIN — SODIUM CHLORIDE, PRESERVATIVE FREE SCH: 5 INJECTION INTRAVENOUS at 18:36

## 2023-03-15 RX ADMIN — Medication SCH MG: at 23:04

## 2023-03-15 RX ADMIN — HEPARIN SODIUM SCH UNIT: 5000 INJECTION, SOLUTION INTRAVENOUS; SUBCUTANEOUS at 20:33

## 2023-03-15 RX ADMIN — SODIUM CHLORIDE SCH MLS/HR: 9 INJECTION, SOLUTION INTRAVENOUS at 20:03

## 2023-03-15 RX ADMIN — ACETAMINOPHEN PRN MG: 325 TABLET ORAL at 05:25

## 2023-03-15 RX ADMIN — POTASSIUM CHLORIDE SCH MEQ: 20 SOLUTION ORAL at 23:04

## 2023-03-15 RX ADMIN — SODIUM CHLORIDE, PRESERVATIVE FREE SCH ML: 5 INJECTION INTRAVENOUS at 08:18

## 2023-03-15 NOTE — PHARMACY PROGRESS NOTE
- Best Possible Medication History


Admit Date and Time: 03/15/23 0147


Processed by: Pharmacy


Medication History completed: Yes


Patient Interview: Completed


Secondary Source(s): Pharmacy records (Pt non compliant on abilify.  Been using 

liquid iv once daily and Super C 1 TID)





As the person ultimately responsible for medication therapy, providers are able 

to order a medication from an existing home medication list in [facility] via the 

"Reconcile Routine" prior to Confirmation of that medication by support staff. 

Such practice is discouraged except when the physician, in their clinical 

judgment, deems that a medical need exists for a medication without regard to 

previous use.

## 2023-03-15 NOTE — ED PHYSICIAN DOCUMENTATION
History of Present Illness





- Stated complaint


Stated Complaint: DIZZYNESS





- Chief complaint


Chief Complaint: Neuro





- History obtained from


History obtained from: Patient





- Additonal information


Additional information: 





32-year-old man with history of mental illness and gitelman syndrome presents 

with dizziness, weakness, blurry vision and difficulty walking for the past 

couple days.  This has been gradual in onset.  Patient also states that he has 

been seeing "ghosts" and "bad things" and feeling very paranoid. He has not been

taking his medications which normally include spironolactone and potassium. 

further history limited by patient difficult historian. 





Review of Systems


Constitutional: denies: Fever


Eyes: reports: Decreased vision


Cardiac: denies: Chest pain / pressure, Palpitations


Neurologic: reports: Generalized weakness, Other (dizziness)





PD PAST MEDICAL HISTORY





- Past Medical History


Cardiovascular: None


Respiratory: None


Neuro: Other


Endocrine/Autoimmune: None


GI: None


GYN: Other (Renal tubular acidosis)


: None, Other


HEENT: None


Psych: Depression, Anxiety


Musculoskeletal: None


Derm: None, Other





- Past Surgical History


Past Surgical History: Yes





- Present Medications


Home Medications: 


                                Ambulatory Orders











 Medication  Instructions  Recorded  Confirmed


 


Potassium Chloride [Klor-Con] 80 meq PO QID 30 Days #480 ea 06/09/22 10/10/22


 


Spironolactone [Aldactone] 12.5 mg PO BID 10/10/22 10/10/22


 


ARIPiprazole [Abilify] 5 mg PO DAILY #30 tablet 10/14/22 














- Allergies


Allergies/Adverse Reactions: 


                                    Allergies











Allergy/AdvReac Type Severity Reaction Status Date / Time


 


No Known Drug Allergies Allergy   Verified 10/10/22 11:14














- Social History


Does the pt smoke?: Yes


Smoking Status: Current every day smoker


Does the pt drink ETOH?: No


Does the pt have substance abuse?: Yes





- Immunizations


Immunizations are current?: Yes





- POLST


Patient has POLST: No


POLST Status: Full Code





PD ED PE NORMAL





- Vitals


Vital signs reviewed: Yes





- General


General: Alert and oriented X 3, No acute distress, Well developed/nourished





- HEENT


HEENT: Atraumatic, PERRL, EOMI





- Cardiac


Cardiac: RRR





- Respiratory


Respiratory: No respiratory distress, Clear bilaterally





- Abdomen


Abdomen: Non tender, Non distended





- Derm


Derm: Normal color, Warm and dry





- Neuro


Neuro: No motor deficit, No sensory deficit


Eye Opening: Spontaneous


Motor: Obeys Commands


Verbal: Oriented


GCS Score: 15





- Psych


Psych: Other (odd affect. some flight of idease. denies SI/HI. does endorse AVH)





Results





- Vitals


Vitals: 


                               Vital Signs - 24 hr











  03/14/23 03/14/23 03/15/23





  23:34 23:37 00:37


 


Temperature 36.8 C  


 


Heart Rate 86 61 71


 


Respiratory 21 25 H 25 H





Rate   


 


Blood Pressure 108/55 L 108/55 L 101/59 L


 


O2 Saturation 98 98 98














  03/15/23





  01:35


 


Temperature 


 


Heart Rate 66


 


Respiratory 24





Rate 


 


Blood Pressure 98/59 L


 


O2 Saturation 98








                                     Oxygen











O2 Source                      Room air

















- EKG (time done)


  ** 0055


EKG releavant findings:: 


EKG personally interpreted by author of this note. Relevant findings are: 





Rate: Rate (enter#) (74)


Rhythm: Other





- Labs


Labs: 


                                Laboratory Tests











  03/15/23 03/15/23 03/15/23





  00:28 00:28 00:40


 


WBC  38.3 H*  


 


RBC  5.57  


 


Hgb  16.3  


 


Hct  43.7  


 


MCV  78.5 L  


 


MCH  29.3  


 


MCHC  37.3 H  


 


RDW  13.5  


 


Plt Count  606 H  


 


MPV  8.6  


 


Neut # (Auto)  Not Reportable  


 


Lymph # (Auto)  Not Reportable  


 


Mono # (Auto)  Not Reportable  


 


Eos # (Auto)  Not Reportable  


 


Baso # (Auto)  Not Reportable  


 


Absolute Nucleated RBC  Not Reportable  


 


Total Counted  100  


 


Band Neuts % (Manual)  0  


 


Abnorm Lymph % (Manual)  0  


 


Nucleated RBC %  Not Reportable  


 


Neutrophils # (Manual)  36.0 H  


 


Lymphocytes # (Manual)  0.8 L  


 


Monocytes # (Manual)  1.5 H  


 


Eosinophils # (Manual)  0.0  


 


Basophils # (Manual)  0.0  


 


Differential Comment  MANUAL DIFFERENTIAL  


 


WBC Morphology  NORMAL APPEARANCE  


 


Platelet Estimate  INCREASED (>450,000)  


 


Platelet Morphology  NORMAL APPEARANCE  


 


RBC Morph Micro Appear  1+ MACROCYTOSIS  


 


Sodium   122 L 


 


Potassium   < 1.5 L* 


 


Chloride   86 L 


 


Carbon Dioxide   17 L 


 


Anion Gap   19.0 H 


 


BUN   50 H 


 


Creatinine   3.2 H 


 


Estimated GFR (MDRD)   22 L 


 


Glucose   114 H 


 


Calcium   7.6 L 


 


Magnesium   2.0 


 


Total Bilirubin   0.5 


 


AST   15 


 


ALT   21 


 


Alkaline Phosphatase   86 


 


Total Protein   8.4 H 


 


Albumin   3.4 


 


Globulin   5.1 H 


 


Albumin/Globulin Ratio   0.7 L 


 


Lipase   576 H 


 


Urine Color    YELLOW


 


Urine Clarity    CLEAR


 


Urine pH    7.0


 


Ur Specific Gravity    1.010


 


Urine Protein    100 H


 


Urine Glucose (UA)    NEGATIVE


 


Urine Ketones    NEGATIVE


 


Urine Occult Blood    MODERATE H


 


Urine Nitrite    NEGATIVE


 


Urine Bilirubin    NEGATIVE


 


Urine Urobilinogen    0.2 (NORMAL)


 


Ur Leukocyte Esterase    NEGATIVE


 


Urine RBC    0-5


 


Urine WBC    0-3


 


Ur Squamous Epith Cells    RARE Squamous


 


Urine Bacteria    None Seen


 


Ur Microscopic Review    INDICATED


 


Urine Culture Comments    NOT INDICATED


 


Urine Opiates Screen    NEGATIVE


 


Ur Oxycodone Screen    NEGATIVE


 


Urine Methadone Screen    NEGATIVE


 


Ur Propoxyphene Screen    NEGATIVE


 


Ur Barbiturates Screen    NEGATIVE


 


Ur Tricyclics Screen    NEGATIVE


 


Ur Phencyclidine Scrn    NEGATIVE


 


Ur Amphetamine Screen    NEGATIVE


 


U Methamphetamines Scrn    NEGATIVE


 


U Benzodiazepines Scrn    NEGATIVE


 


Urine Cocaine Screen    NEGATIVE


 


U Cannabinoids Screen    POSITIVE H














PD Medical Decision Making





- ED course


ED course: 











32yM p/w severe hypokalemia 2/2 gitelman syndrome. We will begin oral and IV 

repletement. Labwork included cbc, abdominal panel, urinalysis, mudds screen. 

positive for cannabis which he had admitted to using. Severe Electrolyte 

derangement consistent with Gettleman syndrome.  Abdominal panel revealed KENDALL 

requiring Perales catheter placement for monitoring of I's and O's.  Urinalysis 

showed hematuria however patient is not experiencing any symptoms and has no 

history of kidney stones.  He does have prominent leukocytosis with white count 

over 30 but no fever or other indication of infection. EKG had severe artifact 

but appears to have regular R-R intervals with some skipped beats. Doubt atrial 

fibrillation but it is clear there is some hypokalemia induced arrhythmia at 

play. 





d/w Dr. Sexton for ICU admission. 





Departure





- Departure


Disposition: 66 [facility] DC/Xfer


Clinical Impression: 


 Leukocytosis, Gitelman syndrome, Hypokalemia, KENDALL (acute kidney injury), 

Hematuria





Condition: Critical

## 2023-03-15 NOTE — PROVIDER PROGRESS NOTE
Nocturnist Note





- Nocturnist Note


Nocturnist Note: 





RN paged to report persistently low potassium despite large quant of potassium 

given. concerned that gitelman is not the correct diagnosis.  noted persistently

low bicarb.  RTA? hypoaldo given concurrently low Na?





start NaHCO3 supp and add additional po K.  


check urine lytes for insight. ammonia lvl. cortisol level. TSH








Griffin Sexton DO


Internal Medicine


[employer]

## 2023-03-15 NOTE — PROVIDER PROGRESS NOTE
Subjective





- Prog Note Date


Prog Note Date: 03/15/23


Prog Note Time: 12:04





- Subjective


Pt reports feeling: No change (Pt states he's "feeling okay" but the pain in his

legs is causing him distress.)





Current Medications





- Current Medications


Current Medications: 





Active Medications











Generic Name Dose Route Start Last Admin





  Trade Name Freq  PRN Reason Stop Dose Admin


 


Acetaminophen  650 mg  03/15/23 01:47  03/15/23 05:25





  Acetaminophen 325 Mg Tablet  PO   650 mg





  Q4HR PRN   Administration





  Pain 1 to 4, or Fever  


 


Albuterol  2.5 mg  03/15/23 01:47 





  Albuterol Neb 2.5 Mg/3 Ml  INH  





  Q4HR PRN  





  Wheezing  


 


Aripiprazole  5 mg  03/15/23 09:00  03/15/23 08:14





  Aripiprazole 5 Mg Tablet  PO   5 mg





  DAILY NEISHA   Administration


 


Heparin Sodium (Porcine)  5,000 unit  03/15/23 09:00  03/15/23 08:15





  Heparin 5,000 Unit/Ml Vial  SUBQ   5,000 unit





  BID NEISHA   Administration


 


Sodium Chloride  1,000 mls @ 75 mls/hr  03/15/23 02:00  03/15/23 02:49





  Normal Saline 0.9%  IV   75 mls/hr





  .[id number] NEISHA   Administration


 


Potassium Chloride 40 meq/  520 mls @ 130 mls/hr  03/15/23 11:00  03/15/23 11:02





  Sodium Chloride  IV  03/15/23 22:59  130 mls/hr





  Q4H NEISHA   Administration


 


Ondansetron HCl  4 mg  03/15/23 01:47 





  Ondansetron 4 Mg/2 Ml Vial  IVP  





  Q6HR PRN  





  Nausea / Vomiting  


 


Pantoprazole Sodium  40 mg  03/15/23 07:00  03/15/23 07:05





  Pantoprazole 40 Mg Tablet  PO   40 mg





  QDAC NEISHA   Administration


 


Potassium Chloride  20 meq  03/15/23 08:00  03/15/23 07:45





  Potassium Chloride 20 Meq/15 Ml Udc  PO   20 meq





  DAILYWM NEISHA   Administration


 


Prochlorperazine Edisylate  10 mg  03/15/23 01:47 





  Prochlorperazine 10 Mg/2 Ml Vial  IVP  





  Q6HR PRN  





  Nausea / Vomiting  


 


Sodium Chloride  10 ml  03/15/23 09:00  03/15/23 08:18





  Sodium Chloride Flush 0.9% 10 Ml Syringe  IVP   10 ml





  0100,0900,1700 NEISHA   Administration


 


Sodium Chloride  10 ml  03/15/23 01:47 





  Sodium Chloride Flush 0.9% 10 Ml Syringe  IVP  





  PRN PRN  





  AS NEEDED PER PROVIDER ORDERS  








                                        





Spironolactone [Aldactone] 12.5 mg PO BID 10/10/22 











Objective





- Vital Signs/Intake & Output


Vital Signs: 


                                Vital Signs x48h











  Temp Pulse Resp BP Pulse Ox


 


 03/15/23 11:00   75  19  101/53 L  97


 


 03/15/23 10:00   72  19  105/56 L  98


 


 03/15/23 09:10  97.9 C H    


 


 03/15/23 09:00   71  21  103/54 L  99


 


 03/15/23 07:00   66  20  106/53 L  98


 


 03/15/23 06:00   62  16  99/50 L  98


 


 03/15/23 05:00   63  16  118/51 L  99











Intake & Output: 


                                 Intake & Output











 03/12/23 03/13/23 03/14/23 03/15/23





 23:59 23:59 23:59 23:59


 


Intake Total    2290


 


Output Total    2340


 


Balance    -50














- Objective


General Appearance: positive: No acute distress (No acute distress, but when 

speaking to him about any problems he may have, patient starts to tear up when 

talking about his leg pains.)


Eyes Bilateral: positive: Normal inspection


Neck: positive: Nml inspection


Respiratory: positive: Chest non-tender, No respiratory distress, Breath sounds 

nml


Cardiovascular: positive: Regular rate & rhythm


Peripheral Pulses: 2+ Radial (R), 2+ Radial (L)


Skin: positive: Color nml


Extremities: positive: Non-tender


Neurologic/Psychiatric: positive: Oriented x3, Sensation nml





- Lab Results


Fish Bones: 


                                 03/15/23 05:36





                                 03/15/23 05:36


Other Labs: 


                               Lab Results x24hrs











  03/15/23 03/15/23 03/15/23 Range/Units





  05:36 05:36 05:36 


 


WBC     (4.8-10.8)  x10^3/uL


 


RBC     (4.70-6.10)  10^6/uL


 


Hgb     (14.0-18.0)  g/dL


 


Hct     (42.0-52.0)  %


 


MCV     (80.0-94.0)  fL


 


MCH     (27.0-31.0)  pg


 


MCHC     (32.0-36.0)  g/dL


 


RDW     (12.0-15.0)  %


 


Plt Count     (130-450)  10^3/uL


 


MPV     (7.4-11.4)  fL


 


Neut # (Auto)     


 


Lymph # (Auto)     


 


Mono # (Auto)     


 


Eos # (Auto)     


 


Baso # (Auto)     


 


Absolute Nucleated RBC     


 


Total Counted     


 


Band Neuts % (Manual)    (0 - 10) %


 


Abnorm Lymph % (Manual)     %


 


Myelocytes %    ( - 0) %


 


Nucleated RBC %     


 


Neutrophils # (Manual)     (1.5-6.6)  10^3/uL


 


Lymphocytes # (Manual)     (1.5-3.5)  10^3/uL


 


Monocytes # (Manual)     (0.0-1.0)  10^3/uL


 


Eosinophils # (Manual)     (0-0.7)  10^3/uL


 


Basophils # (Manual)     (0-0.1)  10^3/uL


 


Differential Comment     


 


WBC Morphology     (NORMAL)  


 


Platelet Estimate     (NORMAL)  


 


Platelet Morphology     (NORMAL)  


 


RBC Morph Micro Appear     (NORMAL)  


 


VBG pH  7.273 L    (7.31-7.41)  


 


Ionized Calcium  0.93 L    (1.15-1.33)  mmol/L


 


Sodium    124 L  (135-145)  mmol/L


 


Potassium    < 1.5 L*  (3.5-5.0)  mmol/L


 


Chloride    92 L  (101-111)  mmol/L


 


Carbon Dioxide    15 L  (21-32)  mmol/L


 


Anion Gap    17.0 H  (6-13)  


 


BUN    43 H  (6-20)  mg/dL


 


Creatinine    2.9 H  (0.6-1.2)  mg/dL


 


Estimated GFR (MDRD)    25 L  (>89)  


 


Glucose    118 H  ()  mg/dL


 


Calcium    7.2 L  (8.5-10.3)  mg/dL


 


Phosphorus    6.8 H  (2.5-4.6)  mg/dL


 


Magnesium     (1.7-2.8)  mg/dL


 


Total Bilirubin     (0.2-1.0)  mg/dL


 


AST     (10-42)  IU/L


 


ALT     (10-60)  IU/L


 


Alkaline Phosphatase     ()  IU/L


 


Total Protein     (6.7-8.2)  g/dL


 


Albumin    3.0 L  (3.2-5.5)  g/dL


 


Globulin     (2.1-4.2)  g/dL


 


Albumin/Globulin Ratio     (1.0-2.2)  


 


Lipase     (22-51)  U/L


 


Procalcitonin   2.11 H*   (<0.5)  ng/mL


 


Urine Color     


 


Urine Clarity     (CLEAR)  


 


Urine pH     (5.0-7.5)  PH


 


Ur Specific Gravity     (1.002-1.030)  


 


Urine Protein     (NEGATIVE)  mg/dL


 


Urine Glucose (UA)     (NEGATIVE)  mg/dL


 


Urine Ketones     (NEGATIVE)  mg/dL


 


Urine Occult Blood     (NEGATIVE)  


 


Urine Nitrite     (NEGATIVE)  


 


Urine Bilirubin     (NEGATIVE)  


 


Urine Urobilinogen     (NORMAL)  E.U./dL


 


Ur Leukocyte Esterase     (NEGATIVE)  


 


Urine RBC     (0-5)  /HPF


 


Urine WBC     (0-3)  /HPF


 


Ur Squamous Epith Cells     (<= Few)  


 


Urine Bacteria     (None Seen)  /HPF


 


Ur Microscopic Review     


 


Urine Culture Comments     


 


Nasal Screen MRSA (PCR)     (NEGATIVE)  


 


Urine Opiates Screen     (NEGATIVE)  


 


Ur Oxycodone Screen     (NEGATIVE)  


 


Urine Methadone Screen     (NEGATIVE)  


 


Ur Propoxyphene Screen     (NEGATIVE)  


 


Ur Barbiturates Screen     (NEGATIVE)  


 


Ur Tricyclics Screen     (NEGATIVE)  


 


Ur Phencyclidine Scrn     (NEGATIVE)  


 


Ur Amphetamine Screen     (NEGATIVE)  


 


U Methamphetamines Scrn     (NEGATIVE)  


 


U Benzodiazepines Scrn     (NEGATIVE)  


 


Urine Cocaine Screen     (NEGATIVE)  


 


U Cannabinoids Screen     (NEGATIVE)  














  03/15/23 03/15/23 03/15/23 Range/Units





  05:36 05:36 04:00 


 


WBC   39.5 H*   (4.8-10.8)  x10^3/uL


 


RBC   5.36   (4.70-6.10)  10^6/uL


 


Hgb   16.0   (14.0-18.0)  g/dL


 


Hct   41.5 L   (42.0-52.0)  %


 


MCV   77.4 L   (80.0-94.0)  fL


 


MCH   29.9   (27.0-31.0)  pg


 


MCHC   38.6 H   (32.0-36.0)  g/dL


 


RDW   13.5   (12.0-15.0)  %


 


Plt Count   562 H   (130-450)  10^3/uL


 


MPV   8.9   (7.4-11.4)  fL


 


Neut # (Auto)   Not Reportable   


 


Lymph # (Auto)   Not Reportable   


 


Mono # (Auto)   Not Reportable   


 


Eos # (Auto)   Not Reportable   


 


Baso # (Auto)   Not Reportable   


 


Absolute Nucleated RBC   Not Reportable   


 


Total Counted   100   


 


Band Neuts % (Manual)   2  (0 - 10) %


 


Abnorm Lymph % (Manual)   0   %


 


Myelocytes %   1 H  ( - 0) %


 


Nucleated RBC %   Not Reportable   


 


Neutrophils # (Manual)   36.3 H   (1.5-6.6)  10^3/uL


 


Lymphocytes # (Manual)   1.6   (1.5-3.5)  10^3/uL


 


Monocytes # (Manual)   1.2 H   (0.0-1.0)  10^3/uL


 


Eosinophils # (Manual)   0.0   (0-0.7)  10^3/uL


 


Basophils # (Manual)   0.0   (0-0.1)  10^3/uL


 


Differential Comment   MANUAL DIFFERENTIAL   


 


WBC Morphology   NORMAL APPEARANCE   (NORMAL)  


 


Platelet Estimate   INCREASED (>450,000)   (NORMAL)  


 


Platelet Morphology   NORMAL APPEARANCE   (NORMAL)  


 


RBC Morph Micro Appear   1+ MACROCYTOSIS   (NORMAL)  


 


VBG pH     (7.31-7.41)  


 


Ionized Calcium     (1.15-1.33)  mmol/L


 


Sodium     (135-145)  mmol/L


 


Potassium     (3.5-5.0)  mmol/L


 


Chloride     (101-111)  mmol/L


 


Carbon Dioxide     (21-32)  mmol/L


 


Anion Gap     (6-13)  


 


BUN     (6-20)  mg/dL


 


Creatinine     (0.6-1.2)  mg/dL


 


Estimated GFR (MDRD)     (>89)  


 


Glucose     ()  mg/dL


 


Calcium     (8.5-10.3)  mg/dL


 


Phosphorus  7.0 H    (2.5-4.6)  mg/dL


 


Magnesium  2.6    (1.7-2.8)  mg/dL


 


Total Bilirubin     (0.2-1.0)  mg/dL


 


AST     (10-42)  IU/L


 


ALT     (10-60)  IU/L


 


Alkaline Phosphatase     ()  IU/L


 


Total Protein     (6.7-8.2)  g/dL


 


Albumin     (3.2-5.5)  g/dL


 


Globulin     (2.1-4.2)  g/dL


 


Albumin/Globulin Ratio     (1.0-2.2)  


 


Lipase     (22-51)  U/L


 


Procalcitonin     (<0.5)  ng/mL


 


Urine Color     


 


Urine Clarity     (CLEAR)  


 


Urine pH     (5.0-7.5)  PH


 


Ur Specific Gravity     (1.002-1.030)  


 


Urine Protein     (NEGATIVE)  mg/dL


 


Urine Glucose (UA)     (NEGATIVE)  mg/dL


 


Urine Ketones     (NEGATIVE)  mg/dL


 


Urine Occult Blood     (NEGATIVE)  


 


Urine Nitrite     (NEGATIVE)  


 


Urine Bilirubin     (NEGATIVE)  


 


Urine Urobilinogen     (NORMAL)  E.U./dL


 


Ur Leukocyte Esterase     (NEGATIVE)  


 


Urine RBC     (0-5)  /HPF


 


Urine WBC     (0-3)  /HPF


 


Ur Squamous Epith Cells     (<= Few)  


 


Urine Bacteria     (None Seen)  /HPF


 


Ur Microscopic Review     


 


Urine Culture Comments     


 


Nasal Screen MRSA (PCR)    NEGATIVE  (NEGATIVE)  


 


Urine Opiates Screen     (NEGATIVE)  


 


Ur Oxycodone Screen     (NEGATIVE)  


 


Urine Methadone Screen     (NEGATIVE)  


 


Ur Propoxyphene Screen     (NEGATIVE)  


 


Ur Barbiturates Screen     (NEGATIVE)  


 


Ur Tricyclics Screen     (NEGATIVE)  


 


Ur Phencyclidine Scrn     (NEGATIVE)  


 


Ur Amphetamine Screen     (NEGATIVE)  


 


U Methamphetamines Scrn     (NEGATIVE)  


 


U Benzodiazepines Scrn     (NEGATIVE)  


 


Urine Cocaine Screen     (NEGATIVE)  


 


U Cannabinoids Screen     (NEGATIVE)  














  03/15/23 03/15/23 03/15/23 Range/Units





  00:40 00:28 00:28 


 


WBC    38.3 H*  (4.8-10.8)  x10^3/uL


 


RBC    5.57  (4.70-6.10)  10^6/uL


 


Hgb    16.3  (14.0-18.0)  g/dL


 


Hct    43.7  (42.0-52.0)  %


 


MCV    78.5 L  (80.0-94.0)  fL


 


MCH    29.3  (27.0-31.0)  pg


 


MCHC    37.3 H  (32.0-36.0)  g/dL


 


RDW    13.5  (12.0-15.0)  %


 


Plt Count    606 H  (130-450)  10^3/uL


 


MPV    8.6  (7.4-11.4)  fL


 


Neut # (Auto)    Not Reportable  


 


Lymph # (Auto)    Not Reportable  


 


Mono # (Auto)    Not Reportable  


 


Eos # (Auto)    Not Reportable  


 


Baso # (Auto)    Not Reportable  


 


Absolute Nucleated RBC    Not Reportable  


 


Total Counted    100  


 


Band Neuts % (Manual)    0 (0 - 10) %


 


Abnorm Lymph % (Manual)    0  %


 


Myelocytes %    ( - 0) %


 


Nucleated RBC %    Not Reportable  


 


Neutrophils # (Manual)    36.0 H  (1.5-6.6)  10^3/uL


 


Lymphocytes # (Manual)    0.8 L  (1.5-3.5)  10^3/uL


 


Monocytes # (Manual)    1.5 H  (0.0-1.0)  10^3/uL


 


Eosinophils # (Manual)    0.0  (0-0.7)  10^3/uL


 


Basophils # (Manual)    0.0  (0-0.1)  10^3/uL


 


Differential Comment    MANUAL DIFFERENTIAL  


 


WBC Morphology    NORMAL APPEARANCE  (NORMAL)  


 


Platelet Estimate    INCREASED (>450,000)  (NORMAL)  


 


Platelet Morphology    NORMAL APPEARANCE  (NORMAL)  


 


RBC Morph Micro Appear    1+ MACROCYTOSIS  (NORMAL)  


 


VBG pH     (7.31-7.41)  


 


Ionized Calcium     (1.15-1.33)  mmol/L


 


Sodium   122 L   (135-145)  mmol/L


 


Potassium   < 1.5 L*   (3.5-5.0)  mmol/L


 


Chloride   86 L   (101-111)  mmol/L


 


Carbon Dioxide   17 L   (21-32)  mmol/L


 


Anion Gap   19.0 H   (6-13)  


 


BUN   50 H   (6-20)  mg/dL


 


Creatinine   3.2 H   (0.6-1.2)  mg/dL


 


Estimated GFR (MDRD)   22 L   (>89)  


 


Glucose   114 H   ()  mg/dL


 


Calcium   7.6 L   (8.5-10.3)  mg/dL


 


Phosphorus     (2.5-4.6)  mg/dL


 


Magnesium   2.0   (1.7-2.8)  mg/dL


 


Total Bilirubin   0.5   (0.2-1.0)  mg/dL


 


AST   15   (10-42)  IU/L


 


ALT   21   (10-60)  IU/L


 


Alkaline Phosphatase   86   ()  IU/L


 


Total Protein   8.4 H   (6.7-8.2)  g/dL


 


Albumin   3.4   (3.2-5.5)  g/dL


 


Globulin   5.1 H   (2.1-4.2)  g/dL


 


Albumin/Globulin Ratio   0.7 L   (1.0-2.2)  


 


Lipase   576 H   (22-51)  U/L


 


Procalcitonin     (<0.5)  ng/mL


 


Urine Color  YELLOW    


 


Urine Clarity  CLEAR    (CLEAR)  


 


Urine pH  7.0    (5.0-7.5)  PH


 


Ur Specific Gravity  1.010    (1.002-1.030)  


 


Urine Protein  100 H    (NEGATIVE)  mg/dL


 


Urine Glucose (UA)  NEGATIVE    (NEGATIVE)  mg/dL


 


Urine Ketones  NEGATIVE    (NEGATIVE)  mg/dL


 


Urine Occult Blood  MODERATE H    (NEGATIVE)  


 


Urine Nitrite  NEGATIVE    (NEGATIVE)  


 


Urine Bilirubin  NEGATIVE    (NEGATIVE)  


 


Urine Urobilinogen  0.2 (NORMAL)    (NORMAL)  E.U./dL


 


Ur Leukocyte Esterase  NEGATIVE    (NEGATIVE)  


 


Urine RBC  0-5    (0-5)  /HPF


 


Urine WBC  0-3    (0-3)  /HPF


 


Ur Squamous Epith Cells  RARE Squamous    (<= Few)  


 


Urine Bacteria  None Seen    (None Seen)  /HPF


 


Ur Microscopic Review  INDICATED    


 


Urine Culture Comments  NOT INDICATED    


 


Nasal Screen MRSA (PCR)     (NEGATIVE)  


 


Urine Opiates Screen  NEGATIVE    (NEGATIVE)  


 


Ur Oxycodone Screen  NEGATIVE    (NEGATIVE)  


 


Urine Methadone Screen  NEGATIVE    (NEGATIVE)  


 


Ur Propoxyphene Screen  NEGATIVE    (NEGATIVE)  


 


Ur Barbiturates Screen  NEGATIVE    (NEGATIVE)  


 


Ur Tricyclics Screen  NEGATIVE    (NEGATIVE)  


 


Ur Phencyclidine Scrn  NEGATIVE    (NEGATIVE)  


 


Ur Amphetamine Screen  NEGATIVE    (NEGATIVE)  


 


U Methamphetamines Scrn  NEGATIVE    (NEGATIVE)  


 


U Benzodiazepines Scrn  NEGATIVE    (NEGATIVE)  


 


Urine Cocaine Screen  NEGATIVE    (NEGATIVE)  


 


U Cannabinoids Screen  POSITIVE H    (NEGATIVE)  














ABX Reporting


Has patient been on IV antibiotics over the past 48 hours?: No





Sepsis Event Note (H)





- Evaluation


Current Stage of Sepsis: Ruled out





Assessment/Plan





- Problem List


(1) Hypokalemia


Impression: 


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 

any medications other than multivitamin. Will continue potassium supplementation

 with monitoring. 








(2) Gitelman syndrome


Impression: 


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 

any medications other than multivitamin. Will continue potassium supplementation

 with monitoring








(3) Acute kidney injury


Impression: 


Acute kidney injury as noted on intake, continue fluid support and avoid 

nephrotoxins. Continue potassium supplimentation and monitor renal function with

 repeat BMP's. 








(4) Leukocytosis


Impression: 


Hx of prior admission with similar presentation. No report and no signs of 

infection. Continue to monitor for signs of infection. Repeat CBC. Consider f/u 

with hematology








(5) Hallucinations


Impression: 


Pt has previous history of Depression, anxiety, and Hallucinations which 

according to the patient has been managed with Abilify. Consider starting 

patient on Abilify and/or follow-up with behavioral health for medication rosa mckeon.

## 2023-03-15 NOTE — HISTORY & PHYSICAL EXAMINATION
Chief Complaint





- Chief Complaint


Chief Complaint: generalized weakness, muscle pain, nausea, vomiting





History of Present Illness





- Admitted From


Admitted From:: ED





- History Obtained From


Records Reviewed: EMR


History obtained from: Patient


Exam Limitations: Telemedicine





- History of Present Illness


HPI Comment/Other: 





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 

reporting generalized weakness, muscle pain, nausea, and vomiting.  Patient 

reports not taking potassium supp as prescribed.  He reports he cannot take 

potassium tablets and prefer the liquid however would get hallucinations and odd

thoughts when taking liquid potassium.  He subsequent stopped taking any 

potassium supp and instead taking multivitamin supp.  Patient interestingly 

mentions liking Abilify however lost it and currently not taking Abilify either.

 He reports past few days of worsening generalized weakness along c muscle pain 

mostly in his legs and back.  He mentions nausea with vomiting past couple of 

days.  No diarrhea. No fever. No chills. No SOB. No palpitation. No LOC.





History





- Past Medical History


Cardiovascular: reports: None


Respiratory: reports: None


Neuro: reports: None


Endocrine/Autoimmune: reports: None


GI: reports: None


: reports: None, Other (Gitleman's Syndrome)


HEENT: reports: None


Psych: reports: Depression, Anxiety


Musculoskeletal: reports: None


Derm: reports: None, Other


MRSA Hx?: No





- Family & Social History


Family History: Mother: Alive and Well, Father: Alive and Well


Family History Comment/Other: Patients half sister has Gettlemans disease


Living Situation: With family


Social History Notes: Patient is currently living in an   near [facility] and lives with his parents. He and his family are originally from [place].

He is part of a indigenous [employer] out of [place]. The patient moved to [facility] with his family more than 3 years ago. He suffers from a number of mental

health issues including depression, anxiety, and anger disorder. The patient is 

unemployed. The patient states his and family's [place] about moving out to [place]. He continues to smoke half a pack a day. He occasionaly smoke 

marijuana and denies any alcohol or other drug use.





- Substance History


Use: Uses substance without health or social issues: Tobacco, Cannabis





- POLST


Patient has POLST: No


POLST Status: Full Code





Meds/Allgy





- Home Medications


Home Medications: 


                                Ambulatory Orders











 Medication  Instructions  Recorded  Confirmed


 


Potassium Chloride [Klor-Con] 80 meq PO QID 30 Days #480 ea 06/09/22 10/10/22


 


Spironolactone [Aldactone] 12.5 mg PO BID 10/10/22 10/10/22


 


ARIPiprazole [Abilify] 5 mg PO DAILY #30 tablet 10/14/22 














- Allergies


Allergies/Adverse Reactions: 


                                    Allergies











Allergy/AdvReac Type Severity Reaction Status Date / Time


 


No Known Drug Allergies Allergy   Verified 10/10/22 11:14














Review of Systems





- Constitutional


Constitutional: reports: Fatigue, Weakness





- Gastrointestinal


Gastrointestinal: reports: Nausea, Vomiting.  denies: Diarrhea





- Genitourinary


Genitourinary: denies: Dysuria





- Musculoskeletal


Musculoskeletal: reports: Muscle pain





- Psychiatric


Psychiatric: reports: Hallucinations





Exam





- Vital Signs


Reviewed Vital Signs: Yes


Vital Signs: 


                                Vital Signs x48h











  Temp Pulse Resp BP Pulse Ox


 


 03/15/23 01:35   66  24  98/59 L  98


 


 03/15/23 00:37   71  25 H  101/59 L  98


 


 03/14/23 23:37   61  25 H  108/55 L  98


 


 03/14/23 23:34  36.8 C  86  21  108/55 L  98














- Physical Exam


General Appearance: positive: No acute distress


Eyes Bilateral: positive: Normal inspection


ENT: positive: ENT inspection nml


Neck: positive: Nml inspection


Respiratory: positive: No respiratory distress


Cardiovascular: positive: Bradycardia


Peripheral Pulses: positive: 2+


Abdomen: positive: Non-tender


Skin: positive: Color nml


Extremities: positive: Nml appearance, Other (muscle pain in legs)


Neurologic/Psychiatric: positive: Oriented x3, CN's nml (2-12)





Sepsis Event Note (H)





- Evaluation


Current Stage of Sepsis: Ruled out





Conclusion/Plan





- Problem List


(1) Hypokalemia


Conclusion/Plan: 


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 

potassium supp.  will try liquid potassium supp and monitor. judicious potassium

 supp in setting of ARF. suspect hallucination to be related to underlying 

psychiatric disorder and not being on Abilify.  monitor potassium level q 6. 

tele monitoring. check mag in am  








(2) Gitelman syndrome


Conclusion/Plan: 


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 

potassium supp.  will try liquid potassium supp and monitor. judicious potassium

 supp in setting of ARF. monitor potassium level q 6. tele monitoring. check mag

 in am. check calcium in am








(3) Acute kidney injury


Conclusion/Plan: 


acute on ckd. likely pre renal given hx of vomiting. will iv fluid support. 

avoid nephrotoxin. judicious potassium supp. monitor renal function in am c 

repeat BMP








(4) Leukocytosis


Conclusion/Plan: 


chronically elevated.  prior admission with similar presentation. no report of 

infection and no signs of infection. rather elevated for reactionary.  monitor 

for sign of infection. followup repeat cbc. consider outpatient followup with 

heme








- Lab Results


Lab results reviewed: Yes


Fish Bones: 


                                 03/15/23 00:28





                                 03/15/23 00:28





Core Measures





- Anticipated LOS


I expect patient to be DC'd or transferred within 96 hours.: No





- DVT/VTE - Prophylaxis


VTE/DVT Device ordered at admit?: Yes





Telemedicine Consult Details





- Provider Location & Consult Time


Telemedicine consultation conducted via videoconferencing?: Yes


List names and roles of persons who participated in consult:: patient and RN


Telemedicine provider location:: [facility]


Time Telemedicine consult began:: 01:32


Time Telemedicine consult completed:: 02:24

## 2023-03-16 LAB
ALBUMIN DIAFP-MCNC: 2.7 G/DL (ref 3.2–5.5)
ANION GAP SERPL CALCULATED.4IONS-SCNC: 14 MMOL/L (ref 6–13)
BUN SERPL-MCNC: 35 MG/DL (ref 6–20)
CA-I SERPL ISE-MCNC: 1.08 MMOL/L (ref 1.15–1.33)
CALCIUM UR-MCNC: 7.9 MG/DL (ref 8.5–10.3)
CHLORIDE SERPL-SCNC: 102 MMOL/L (ref 101–111)
CHLORIDE [MOLAR AMOUNT] IN URINE COLLECTED FOR UNSPECIFIED DURATION: 66 MMOL/L
CO2 SERPL-SCNC: 13 MMOL/L (ref 21–32)
CREAT SERPLBLD-SCNC: 2.6 MG/DL (ref 0.6–1.2)
CREAT UR-SCNC: 18.8 MG/DL
GFRSERPLBLD MDRD-ARVRAT: 28 ML/MIN/{1.73_M2} (ref 89–?)
GLUCOSE SERPL-MCNC: 112 MG/DL (ref 70–100)
PH BLDV: 7.23 [PH] (ref 7.31–7.41)
PHOSPHATE BLD-MCNC: 5.8 MG/DL (ref 2.5–4.6)
POTASSIUM 24H UR-MRATE: 16.9 MMOL/L
POTASSIUM SERPL-SCNC: < 1.5 MMOL/L (ref 3.5–5)
POTASSIUM UR-SCNC: 16.6 MMOL/L
SODIUM SERPLBLD-SCNC: 129 MMOL/L (ref 135–145)

## 2023-03-16 RX ADMIN — POTASSIUM CHLORIDE SCH MEQ: 20 SOLUTION ORAL at 10:18

## 2023-03-16 RX ADMIN — PANTOPRAZOLE SODIUM SCH MG: 40 TABLET, DELAYED RELEASE ORAL at 06:18

## 2023-03-16 RX ADMIN — SODIUM CHLORIDE TAB 1 GM SCH GM: 1 TAB at 21:24

## 2023-03-16 RX ADMIN — ACETAMINOPHEN PRN MG: 325 TABLET ORAL at 02:03

## 2023-03-16 RX ADMIN — SODIUM CHLORIDE SCH MLS/HR: 9 INJECTION, SOLUTION INTRAVENOUS at 12:26

## 2023-03-16 RX ADMIN — ACETAMINOPHEN PRN MG: 325 TABLET ORAL at 14:13

## 2023-03-16 RX ADMIN — SODIUM CHLORIDE, PRESERVATIVE FREE SCH ML: 5 INJECTION INTRAVENOUS at 10:25

## 2023-03-16 RX ADMIN — SODIUM CHLORIDE SCH: 9 INJECTION, SOLUTION INTRAVENOUS at 13:42

## 2023-03-16 RX ADMIN — SODIUM CHLORIDE TAB 1 GM SCH GM: 1 TAB at 17:00

## 2023-03-16 RX ADMIN — SODIUM CHLORIDE SCH MLS/HR: 9 INJECTION, SOLUTION INTRAVENOUS at 00:31

## 2023-03-16 RX ADMIN — HEPARIN SODIUM SCH UNIT: 5000 INJECTION, SOLUTION INTRAVENOUS; SUBCUTANEOUS at 21:24

## 2023-03-16 RX ADMIN — Medication SCH MG: at 14:13

## 2023-03-16 RX ADMIN — HEPARIN SODIUM SCH UNIT: 5000 INJECTION, SOLUTION INTRAVENOUS; SUBCUTANEOUS at 10:19

## 2023-03-16 RX ADMIN — SODIUM CHLORIDE SCH MLS/HR: 9 INJECTION, SOLUTION INTRAVENOUS at 17:01

## 2023-03-16 RX ADMIN — SODIUM CHLORIDE, PRESERVATIVE FREE SCH ML: 5 INJECTION INTRAVENOUS at 17:01

## 2023-03-16 RX ADMIN — Medication SCH MG: at 21:24

## 2023-03-16 RX ADMIN — POTASSIUM CHLORIDE SCH MEQ: 20 SOLUTION ORAL at 21:23

## 2023-03-16 RX ADMIN — SODIUM CHLORIDE SCH MLS/HR: 9 INJECTION, SOLUTION INTRAVENOUS at 21:22

## 2023-03-16 RX ADMIN — Medication SCH MG: at 06:18

## 2023-03-16 NOTE — PROVIDER PROGRESS NOTE
Progress Note





March 16, 2023


3:30 PM





Mr. Winters has been requiring an inordinate amount of potassium 

supplementation, even for him.   Even at 120 mEq a day, his potassium was still 

undetectable at less than 1.5.  Telemedicine was contacted overnight.  They feel

that the diagnosis may be an error.  I spoke to his nephrologist Dr. Vasquez, who 

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 

Gitleman's syndrome.  He recommends that we continue to pour potassium into him 

via IV, and consider adding high-dose spironolactone.





Juan states that he just does not feel well.  Count hurts all over.  When I 

ask him about his weight loss he shrugs his shoulders and says "I just have no 

interest in eating".  He denies any specific abdominal pain, diarrhea, biliary 

colic, epigastric pain.  He is cagey about my asking him about hallucinations 

but he is still having them.  My interpretation of some of his comments is that 

he is paranoid about answering my questions.





Nevertheless, there is no cough, fever, chest pain, and he is having episodes of

second and third degree AV block on telemetry.  An EKG was done this morning and

I interpreted.  He has sinus rhythm.  First-degree AV block.  Prolonged NH 

interval.  He has repolarization abnormalities diffusely.  T waves are 

flattened.





Active Medications





Acetaminophen (Acetaminophen 325 Mg Tablet)  650 mg PO Q4HR PRN


   PRN Reason: Pain 1 to 4, or Fever


   Last Admin: 03/16/23 14:13 Dose:  650 mg


   


Albuterol (Albuterol Neb 2.5 Mg/3 Ml)  2.5 mg INH Q4HR PRN


   PRN Reason: Wheezing


Aripiprazole (Aripiprazole 5 Mg Tablet)  5 mg PO DAILY [facility]


   Last Admin: 03/16/23 10:19 Dose:  5 mg


   


Heparin Sodium (Porcine) (Heparin 5,000 Unit/Ml Vial)  5,000 unit SUBQ BID [facility]


   Last Admin: 03/16/23 10:19 Dose:  5,000 unit


   


Sodium Chloride (Normal Saline 0.9%)  1,000 mls @ 75 mls/hr IV .[id number] [facility]


   Last Admin: 03/16/23 13:42 Dose:  Not Given


   


Potassium Chloride 40 meq/ (Sodium Chloride)  520 mls @ 130 mls/hr IV Q4H [facility]


   Stop: 03/16/23 23:59


   Last Admin: 03/16/23 12:26 Dose:  130 mls/hr


   


Ondansetron HCl (Ondansetron 4 Mg/2 Ml Vial)  4 mg IVP Q6HR PRN


   PRN Reason: Nausea / Vomiting


Pantoprazole Sodium (Pantoprazole 40 Mg Tablet)  40 mg PO QDAC [facility]


   Last Admin: 03/16/23 06:18 Dose:  40 mg


   


Potassium Chloride (Potassium Chloride 20 Meq/15 Ml Udc)  40 meq PO BID [facility]


   Last Admin: 03/16/23 10:18 Dose:  40 meq


   


Prochlorperazine Edisylate (Prochlorperazine 10 Mg/2 Ml Vial)  10 mg IVP Q6HR 

PRN


   PRN Reason: Nausea / Vomiting


Sodium Bicarbonate (Sodium Bicarbonate 650 Mg Tablet)  650 mg PO TID [facility]


   Last Admin: 03/16/23 14:13 Dose:  650 mg


   


Sodium Chloride (Sodium Chloride Flush 0.9% 10 Ml Syringe)  10 ml IVP 

0100,0900,1700 [facility]


   Last Admin: 03/16/23 10:25 Dose:  10 ml


   


Sodium Chloride (Sodium Chloride Flush 0.9% 10 Ml Syringe)  10 ml IVP PRN PRN


   PRN Reason: AS NEEDED PER PROVIDER ORDERS


Sodium Chloride (Sodium Chloride 1 Gm Tablet)  1 gm PO TID [facility]





                                        





Home Meds:


Liquid Iv 1 pkt PO DAILY 03/15/23 


Super C 1 tab PO TID 03/15/23 





Exam:


Temperature is 36.6.  Pulse is 80.  Blood pressure 113/60.  Respirations 15.  

100% on room air.


He is a thin  male sitting upright in bed, alert, oriented to 

person place and time.  No tremors, no diaphoresis no complaints of shortness of

breath.


Neck is supple


Lungs are clear to auscultation and percussion without crackles rhonchi wheezing

or increased use of accessory muscles with speaking to me


Regular rate and rhythm


Abdomen is soft, nontender, hypoactive bowel sounds


Extremities without edema


Neuro shows him to be alert and oriented to person place and time.  While he 

complains of myalgias and aches, there is no proximal muscle weakness.  No 

changes in reflexes.  I find that amazing considering his potassium.





Lab:


Sodium 129, his admission sodium was 122 and has been slowly coming up. 


 Potassium less than 1.5, 


BUN 35, creatinine 2.6.  His admission creatinine was 3.2 and he has been slowly

coming down.


Anion gap 14, calcium 7.9 with an albumin of 2.7.  To calculate a corrected 

calcium, I use low normal albumin at 3.2.  With that calculation corrected 

calcium is 8.3 mg/dL.  Low normal is 8.5.


Phosphorus is high at 5.8.  TSH is normal at 2.38.  Cortisol is 19.9.  Magnesium

is 2.2.





Assessment/plan





1.  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  [place].  [place] like to have them an

d only trust one of them which is Dr. Villareal.  Dr. Villareal was in clinic today here 

at our medical ambulatory clinic and I was fortunate enough to be able to speak 

to him about Mr. Lachman.  Mr. Lachman has not shown up in clinic for very long 

time but Dr. Villareal remembered him.  Dr. Villareal does not feel that the diagnosis is 

in question.  He recommends that I continue to keep on giving the patient 

potassium riders.  Consider giving him high-dose spironolactone.  And in reading

up-to-date, sodium chloride tablets of 1 to 3 g 2-4 times a day are recommended.

 They also recommend we make sure that his magnesium is at normal levels.





Plan:


-Continue daily magnesium checks and supplement with magnesium sulfate IV 2 g 

daily as needed


 Continue potassium riders and I have given another 120 mEq order for the next 

12 hours


 Check PTH


 Salt tablets 1 g, 3 times daily





3.  Acute kidney injury.  This patient is usually with a normal creatinine.  He 

is thin, appears malnourished in comparison to his usual body habitus.  He may 

have been dehydrated on admission.  His creatinine is slowly improving with his 

aggressive IV hydration and IV K riders..  We will continue to check daily and 

avoid nephrotoxins.





4.  Leukocytosis.


On review of systems he denies fever, chills.  There is no cough.  No abdominal 

pain.  No urgency, frequency, dysuria.  His white cell count has been elevated 

since 2013.  The highest he achieved was a 48.6 in September 2019.  Next highest

was 48.1 in October 2022.  In reviewing the medical record he did have a slide 

for pathology review in January 2017 and he has leukocyte changes consistent 

with acute inflammation or sepsis at that point.  He had another slide review 

September 15, 2022.  "Review of the peripheral blood smear collected on 

September 15, 2022 reveals elevated absolute erythrocyte count as well as 

leukocytosis.  The leukocytes are predominantly comprised of mature neutrophils.

 Other than confirmation of the CBC indices, no additional diagnostic 

alterations are identified by microscopic examination of the peripheral blood.  

If a clinical explanation for the findings is not apparent, the possibility of a

myeloproliferative disorder could be considered, although would be somewhat 

unusual in the patient's age.  Clinical correlation is needed to determine if 

evaluation of the marrow is indicated".





For completeness sake, I will discuss with oncology clinic tomorrow.





5.  Hypocalcemia.  Corrected calcium is borderline low.  I will give calcium car

bonate IV

## 2023-03-17 LAB
ALBUMIN DIAFP-MCNC: 2.8 G/DL (ref 3.2–5.5)
ANION GAP SERPL CALCULATED.4IONS-SCNC: 11 MMOL/L (ref 6–13)
ANION GAP SERPL CALCULATED.4IONS-SCNC: 12 MMOL/L (ref 6–13)
BUN SERPL-MCNC: 27 MG/DL (ref 6–20)
BUN SERPL-MCNC: 29 MG/DL (ref 6–20)
CA-I SERPL ISE-MCNC: 1.04 MMOL/L (ref 1.15–1.33)
CALCIUM UR-MCNC: 7.2 MG/DL (ref 8.5–10.3)
CALCIUM UR-MCNC: 7.7 MG/DL (ref 8.5–10.3)
CHLORIDE SERPL-SCNC: 105 MMOL/L (ref 101–111)
CHLORIDE SERPL-SCNC: 106 MMOL/L (ref 101–111)
CO2 SERPL-SCNC: 13 MMOL/L (ref 21–32)
CO2 SERPL-SCNC: 13 MMOL/L (ref 21–32)
CREAT SERPLBLD-SCNC: 2.2 MG/DL (ref 0.6–1.2)
CREAT SERPLBLD-SCNC: 2.2 MG/DL (ref 0.6–1.2)
GFRSERPLBLD MDRD-ARVRAT: 34 ML/MIN/{1.73_M2} (ref 89–?)
GFRSERPLBLD MDRD-ARVRAT: 34 ML/MIN/{1.73_M2} (ref 89–?)
GLUCOSE SERPL-MCNC: 156 MG/DL (ref 70–100)
GLUCOSE SERPL-MCNC: 88 MG/DL (ref 70–100)
PH BLDV: 7.28 [PH] (ref 7.31–7.41)
PHOSPHATE BLD-MCNC: 3.1 MG/DL (ref 2.5–4.6)
POTASSIUM SERPL-SCNC: < 1.5 MMOL/L (ref 3.5–5)
POTASSIUM SERPL-SCNC: < 1.5 MMOL/L (ref 3.5–5)
SODIUM SERPLBLD-SCNC: 129 MMOL/L (ref 135–145)
SODIUM SERPLBLD-SCNC: 131 MMOL/L (ref 135–145)

## 2023-03-17 RX ADMIN — Medication SCH MG: at 21:13

## 2023-03-17 RX ADMIN — Medication SCH MG: at 06:08

## 2023-03-17 RX ADMIN — SODIUM CHLORIDE, PRESERVATIVE FREE SCH ML: 5 INJECTION INTRAVENOUS at 18:26

## 2023-03-17 RX ADMIN — SODIUM CHLORIDE SCH MLS/HR: 9 INJECTION, SOLUTION INTRAVENOUS at 13:34

## 2023-03-17 RX ADMIN — SODIUM CHLORIDE, PRESERVATIVE FREE SCH ML: 5 INJECTION INTRAVENOUS at 10:32

## 2023-03-17 RX ADMIN — HEPARIN SODIUM SCH: 5000 INJECTION, SOLUTION INTRAVENOUS; SUBCUTANEOUS at 10:31

## 2023-03-17 RX ADMIN — SODIUM CHLORIDE SCH: 9 INJECTION, SOLUTION INTRAVENOUS at 18:22

## 2023-03-17 RX ADMIN — SODIUM CHLORIDE SCH MLS/HR: 9 INJECTION, SOLUTION INTRAVENOUS at 10:38

## 2023-03-17 RX ADMIN — SPIRONOLACTONE SCH MG: 25 TABLET, FILM COATED ORAL at 10:31

## 2023-03-17 RX ADMIN — Medication SCH MG: at 13:33

## 2023-03-17 RX ADMIN — SODIUM CHLORIDE SCH MLS/HR: 9 INJECTION, SOLUTION INTRAVENOUS at 23:31

## 2023-03-17 RX ADMIN — POTASSIUM CHLORIDE SCH MEQ: 20 SOLUTION ORAL at 10:29

## 2023-03-17 RX ADMIN — SODIUM CHLORIDE, PRESERVATIVE FREE SCH ML: 5 INJECTION INTRAVENOUS at 04:14

## 2023-03-17 RX ADMIN — CARBOXYMETHYLCELLULOSE SODIUM PRN DROPS: 10 GEL OPHTHALMIC at 21:12

## 2023-03-17 RX ADMIN — SODIUM CHLORIDE SCH MLS/HR: 9 INJECTION, SOLUTION INTRAVENOUS at 02:42

## 2023-03-17 RX ADMIN — SODIUM CHLORIDE TAB 1 GM SCH GM: 1 TAB at 21:13

## 2023-03-17 RX ADMIN — SODIUM CHLORIDE TAB 1 GM SCH GM: 1 TAB at 13:33

## 2023-03-17 RX ADMIN — HEPARIN SODIUM SCH: 5000 INJECTION, SOLUTION INTRAVENOUS; SUBCUTANEOUS at 21:48

## 2023-03-17 RX ADMIN — SODIUM CHLORIDE TAB 1 GM SCH GM: 1 TAB at 06:08

## 2023-03-17 RX ADMIN — SODIUM CHLORIDE SCH MLS/HR: 9 INJECTION, SOLUTION INTRAVENOUS at 18:26

## 2023-03-17 RX ADMIN — PANTOPRAZOLE SODIUM SCH MG: 40 TABLET, DELAYED RELEASE ORAL at 06:08

## 2023-03-17 RX ADMIN — POTASSIUM CHLORIDE SCH MEQ: 20 SOLUTION ORAL at 21:12

## 2023-03-17 NOTE — PROVIDER PROGRESS NOTE
Progress Note





March 17, 2023


5:07 PM





Continues to require potassium large amounts.  I have also been supplementing 

his calcium IV, and magnesium IV.





He is not eating very much.  Denies any chest pain, palpitations.  Shortness of 

breath.  He just feels weak, and tired.





No further arrhythmias on telemetry.  EKG shows sinus with slightly flattened T 

waves yesterday morning.





Exam:


Temperature is 36.9, heart rate 83, blood pressure 102/64, respirations 24, 95% 

on room air.


Both nutrition services and I note that Juan is quite thin.  Over the years he

has been losing more more weight.  But he has been eating everything that 

kitchen brings to him.  He even had to breakfast yesterday.  He says he just 

does not eat much when he is at home.


Neck is supple


Lungs are clear without any increased respiratory effort.  He is able to sit 

himself up for a lung exam, and lay back down without any assist.


Regular rate and rhythm


Abdomen is soft, nontender, normal bowel sounds


Extremities are warm without edema and there are no tremors, jerks, or change in

reflux.





Lab:


Sodium continues to successfully creep up.  He was 129 this morning, 131 3 hours

later.


Potassium continues to be less than 1.5


Carbon dioxide continues to be low at 13, BUN is 27, creatinine was 3.2 on 

admission.  That is the highest Juan has been with this.  He has been coming 

down and is 2.2 today.





Assessment/plan





1.  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  Nephrology is advised on March 16 

with to continue to poor potassium intake.  And to start him on spironolactone. 

After giving him 160 mg once yesterday, and potassium did not budge, I have gone

ahead and started him on spironolactone today.  I will give him another 160 mEq 

of potassium.  I have given him magnesium 2 g today.  And calcium gluconate at 

1000 mg.  I have also started salt tablets 1 g p.o. 3 times daily today.





2.  Acute kidney injury.  Unclear why this happened.   I can presume is 

dehydration in the outpatient setting due to decreased p.o. intake.  As 

manifested by his weight loss as well.  I am avoiding nephrotoxins, and checking

his creatinine daily.





3.  Leukocytosis.  He has had 2 slides to review his white cell count.  No 

infection seen.  At this time no antibiotics.





4.  Hypocalcemia.  Given calcium gluconate today.

## 2023-03-18 LAB
ANION GAP SERPL CALCULATED.4IONS-SCNC: 11 MMOL/L (ref 6–13)
ANION GAP SERPL CALCULATED.4IONS-SCNC: 9 MMOL/L (ref 6–13)
BUN SERPL-MCNC: 18 MG/DL (ref 6–20)
BUN SERPL-MCNC: 22 MG/DL (ref 6–20)
CA-I SERPL ISE-MCNC: 1.06 MMOL/L (ref 1.15–1.33)
CA-I SERPL ISE-MCNC: 1.08 MMOL/L (ref 1.15–1.33)
CA-I SERPL ISE-MCNC: 1.1 MMOL/L (ref 1.15–1.33)
CALCIUM UR-MCNC: 7.7 MG/DL (ref 8.5–10.3)
CALCIUM UR-MCNC: 8.2 MG/DL (ref 8.5–10.3)
CHLORIDE SERPL-SCNC: 103 MMOL/L (ref 101–111)
CHLORIDE SERPL-SCNC: 108 MMOL/L (ref 101–111)
CO2 SERPL-SCNC: 16 MMOL/L (ref 21–32)
CO2 SERPL-SCNC: 17 MMOL/L (ref 21–32)
CREAT SERPLBLD-SCNC: 1.9 MG/DL (ref 0.6–1.2)
CREAT SERPLBLD-SCNC: 2 MG/DL (ref 0.6–1.2)
GFRSERPLBLD MDRD-ARVRAT: 38 ML/MIN/{1.73_M2} (ref 89–?)
GFRSERPLBLD MDRD-ARVRAT: 41 ML/MIN/{1.73_M2} (ref 89–?)
GLUCOSE SERPL-MCNC: 123 MG/DL (ref 70–100)
GLUCOSE SERPL-MCNC: 89 MG/DL (ref 70–100)
MAGNESIUM SERPL-MCNC: 1.5 MG/DL (ref 1.7–2.8)
PH BLDV: 7.29 [PH] (ref 7.31–7.41)
PH BLDV: 7.34 [PH] (ref 7.31–7.41)
PH BLDV: 7.36 [PH] (ref 7.31–7.41)
POTASSIUM SERPL-SCNC: 2 MMOL/L (ref 3.5–5)
POTASSIUM SERPL-SCNC: 2.3 MMOL/L (ref 3.5–5)
SODIUM SERPLBLD-SCNC: 130 MMOL/L (ref 135–145)
SODIUM SERPLBLD-SCNC: 134 MMOL/L (ref 135–145)

## 2023-03-18 RX ADMIN — SODIUM CHLORIDE TAB 1 GM SCH GM: 1 TAB at 20:06

## 2023-03-18 RX ADMIN — HEPARIN SODIUM SCH: 5000 INJECTION, SOLUTION INTRAVENOUS; SUBCUTANEOUS at 20:21

## 2023-03-18 RX ADMIN — SODIUM CHLORIDE, PRESERVATIVE FREE SCH ML: 5 INJECTION INTRAVENOUS at 09:03

## 2023-03-18 RX ADMIN — CARBOXYMETHYLCELLULOSE SODIUM PRN DROPS: 10 GEL OPHTHALMIC at 05:34

## 2023-03-18 RX ADMIN — SODIUM CHLORIDE, PRESERVATIVE FREE SCH ML: 5 INJECTION INTRAVENOUS at 21:54

## 2023-03-18 RX ADMIN — HEPARIN SODIUM SCH: 5000 INJECTION, SOLUTION INTRAVENOUS; SUBCUTANEOUS at 09:03

## 2023-03-18 RX ADMIN — SODIUM CHLORIDE SCH: 9 INJECTION, SOLUTION INTRAVENOUS at 13:15

## 2023-03-18 RX ADMIN — CALCIUM CARBONATE (ANTACID) CHEW TAB 500 MG SCH MG: 500 CHEW TAB at 09:04

## 2023-03-18 RX ADMIN — POTASSIUM CHLORIDE SCH MEQ: 20 SOLUTION ORAL at 20:06

## 2023-03-18 RX ADMIN — POTASSIUM CHLORIDE SCH MEQ: 20 SOLUTION ORAL at 09:03

## 2023-03-18 RX ADMIN — CALCIUM CARBONATE (ANTACID) CHEW TAB 500 MG SCH MG: 500 CHEW TAB at 06:19

## 2023-03-18 RX ADMIN — SODIUM CHLORIDE, PRESERVATIVE FREE SCH: 5 INJECTION INTRAVENOUS at 17:07

## 2023-03-18 RX ADMIN — Medication SCH MG: at 12:55

## 2023-03-18 RX ADMIN — PANTOPRAZOLE SODIUM SCH MG: 40 TABLET, DELAYED RELEASE ORAL at 05:33

## 2023-03-18 RX ADMIN — SODIUM CHLORIDE SCH: 9 INJECTION, SOLUTION INTRAVENOUS at 21:59

## 2023-03-18 RX ADMIN — SODIUM CHLORIDE TAB 1 GM SCH GM: 1 TAB at 05:33

## 2023-03-18 RX ADMIN — SODIUM CHLORIDE TAB 1 GM SCH GM: 1 TAB at 12:55

## 2023-03-18 RX ADMIN — Medication SCH MG: at 20:07

## 2023-03-18 RX ADMIN — SPIRONOLACTONE SCH MG: 25 TABLET, FILM COATED ORAL at 09:03

## 2023-03-18 RX ADMIN — SODIUM CHLORIDE, PRESERVATIVE FREE SCH: 5 INJECTION INTRAVENOUS at 02:22

## 2023-03-18 RX ADMIN — Medication SCH MG: at 05:33

## 2023-03-18 NOTE — PROVIDER PROGRESS NOTE
Progress Note





March 18, 2023


6:52 PM





Mr. Winters continues to hallucinate.  We have observed him talking to someone 

in the room who is not there.  And when we ask him who he is talking to he 

starts a sentence by saying "you mean you do not see" and then he falls silent 

or says that he is talking to no one.





He denies chest pain.  Denies paresthesias.  Denies cough or shortness of 

breath.  He is starting to get his usual anxiety once his potassium gets above 

2.  He is starting to make conversation with the nurse about how he is going to 

leave.  He wanders down to the back door of the hallway and is asking where the 

store leads.  Were trying to encourage him to stay until his potassium is 

normal.  He hates the blood pressure cuff and the telemetry monitor.





Exam:


\Temperature is 36.7, heart rate 82, blood pressure 115/69, respirations 22, 

100% on room air.


5 foot 8 inches tall, 65 kg


Tanned  male in no acute distress.  No tachypnea no tachycardia. 

Eating 100% of his food.  Appears quite comfortable.


Neck is supple


Lungs are clear to auscultation and percussion


Regular rate and rhythm


Abdomen is soft, nontender


Extremities have no edema





Labs:


Sodium 134, potassium 2.3, carbon dioxide 17, BUN 22, creatinine 1.9.  He was 

admitted with the highest creatinine he is ever had.  It was 3.2.  Phosphorus is

4.2.  Magnesium is 1.5.  I supplemented the magnesium and is now 2.2.





Assessment/plan





1.  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  I started him on spironolactone 

March 17.  Continue to give him magnesium daily and gave him 2 g yesterday and 2

g today.  I also given calcium gluconate yesterday but not needed today.  I st

arted him on salt tablets 1 g p.o. 3 times a day.  Between that and 120 mEq of 

potassium his potassium is finally above 2 today.





Plan: Continue all the above medications and I will give him another 120 mEq of 

potassium.


I hope he does not leave AMA.





2.  Acute kidney injury.  Most likely due to dehydration from what I can tell of

his description of not wanting to eat or drink at home.  I am avoiding 

nephrotoxins, checking creatinine daily.  He is on 75 cc an hour of normal 

saline.  And he is eating and drinking appropriately.  Plan to continue 

monitoring while he is in the hospital.

## 2023-03-19 LAB
ANION GAP SERPL CALCULATED.4IONS-SCNC: 11 MMOL/L (ref 6–13)
BUN SERPL-MCNC: 19 MG/DL (ref 6–20)
CA-I SERPL ISE-MCNC: 1.14 MMOL/L (ref 1.15–1.33)
CALCIUM UR-MCNC: 8.2 MG/DL (ref 8.5–10.3)
CHLORIDE SERPL-SCNC: 106 MMOL/L (ref 101–111)
CO2 SERPL-SCNC: 15 MMOL/L (ref 21–32)
CREAT SERPLBLD-SCNC: 1.8 MG/DL (ref 0.6–1.2)
GFRSERPLBLD MDRD-ARVRAT: 43 ML/MIN/{1.73_M2} (ref 89–?)
GLUCOSE SERPL-MCNC: 91 MG/DL (ref 70–100)
MAGNESIUM SERPL-MCNC: 1.7 MG/DL (ref 1.7–2.8)
PH BLDV: 7.37 [PH] (ref 7.31–7.41)
PHOSPHATE BLD-MCNC: 4.5 MG/DL (ref 2.5–4.6)
POTASSIUM SERPL-SCNC: 2.2 MMOL/L (ref 3.5–5)
POTASSIUM SERPL-SCNC: 2.4 MMOL/L (ref 3.5–5)
SODIUM SERPLBLD-SCNC: 132 MMOL/L (ref 135–145)

## 2023-03-19 RX ADMIN — POTASSIUM CHLORIDE SCH MLS/HR: 7.46 INJECTION, SOLUTION INTRAVENOUS at 06:59

## 2023-03-19 RX ADMIN — SODIUM CHLORIDE TAB 1 GM SCH GM: 1 TAB at 05:59

## 2023-03-19 RX ADMIN — POTASSIUM CHLORIDE SCH MLS/HR: 7.46 INJECTION, SOLUTION INTRAVENOUS at 21:10

## 2023-03-19 RX ADMIN — SPIRONOLACTONE SCH MG: 25 TABLET, FILM COATED ORAL at 08:34

## 2023-03-19 RX ADMIN — HEPARIN SODIUM SCH: 5000 INJECTION, SOLUTION INTRAVENOUS; SUBCUTANEOUS at 08:35

## 2023-03-19 RX ADMIN — Medication SCH MG: at 14:29

## 2023-03-19 RX ADMIN — SODIUM CHLORIDE SCH MLS/HR: 9 INJECTION, SOLUTION INTRAVENOUS at 14:29

## 2023-03-19 RX ADMIN — SODIUM CHLORIDE TAB 1 GM SCH GM: 1 TAB at 14:29

## 2023-03-19 RX ADMIN — SODIUM CHLORIDE TAB 1 GM SCH GM: 1 TAB at 21:10

## 2023-03-19 RX ADMIN — PANTOPRAZOLE SODIUM SCH MG: 40 TABLET, DELAYED RELEASE ORAL at 05:59

## 2023-03-19 RX ADMIN — POTASSIUM CHLORIDE SCH: 7.46 INJECTION, SOLUTION INTRAVENOUS at 17:52

## 2023-03-19 RX ADMIN — SODIUM CHLORIDE SCH MLS/HR: 9 INJECTION, SOLUTION INTRAVENOUS at 09:12

## 2023-03-19 RX ADMIN — POTASSIUM CHLORIDE SCH MLS/HR: 7.46 INJECTION, SOLUTION INTRAVENOUS at 22:11

## 2023-03-19 RX ADMIN — HEPARIN SODIUM SCH: 5000 INJECTION, SOLUTION INTRAVENOUS; SUBCUTANEOUS at 20:47

## 2023-03-19 RX ADMIN — POTASSIUM CHLORIDE SCH MEQ: 20 SOLUTION ORAL at 08:35

## 2023-03-19 RX ADMIN — Medication SCH MG: at 21:10

## 2023-03-19 RX ADMIN — SODIUM CHLORIDE SCH: 9 INJECTION, SOLUTION INTRAVENOUS at 09:15

## 2023-03-19 RX ADMIN — SODIUM CHLORIDE SCH MLS/HR: 9 INJECTION, SOLUTION INTRAVENOUS at 05:58

## 2023-03-19 RX ADMIN — SODIUM CHLORIDE, PRESERVATIVE FREE SCH: 5 INJECTION INTRAVENOUS at 17:43

## 2023-03-19 RX ADMIN — POTASSIUM CHLORIDE SCH MLS/HR: 7.46 INJECTION, SOLUTION INTRAVENOUS at 05:58

## 2023-03-19 RX ADMIN — SODIUM CHLORIDE, PRESERVATIVE FREE PRN ML: 5 INJECTION INTRAVENOUS at 06:02

## 2023-03-19 RX ADMIN — POTASSIUM CHLORIDE SCH MEQ: 20 SOLUTION ORAL at 21:10

## 2023-03-19 RX ADMIN — SODIUM CHLORIDE, PRESERVATIVE FREE SCH ML: 5 INJECTION INTRAVENOUS at 08:35

## 2023-03-19 RX ADMIN — POTASSIUM CHLORIDE SCH MLS/HR: 7.46 INJECTION, SOLUTION INTRAVENOUS at 23:12

## 2023-03-19 RX ADMIN — SODIUM CHLORIDE, PRESERVATIVE FREE SCH ML: 5 INJECTION INTRAVENOUS at 21:10

## 2023-03-19 RX ADMIN — Medication SCH MG: at 05:59

## 2023-03-19 NOTE — PROVIDER PROGRESS NOTE
Progress Note











March 19, 2023


5:42 PM





Mr. Winters's usual pattern of behavior is to leave once he feels well enough 

with his potassium even if it is not at goal.  He is already starting to exhibit

that behavior but is still willing to stay.  His mom has shared with us that he 

really has not been eating or drinking.  He is convinced that his food is 

poisoned.  He is convinced that the water in their trailer is poisoned.  He did 

go as far as to by bottled water.  He bought something like 24 bottles.  But he 

took a few sips of 1 and he felt like it was poisoned so he painstakingly cut 

the bottles open with a knife and let the water drain away.  So I think that he 

has been refusing to eat or drink because of a delusional thought process.  

Which would explain why he was in such severe renal failure on admission and why

he is losing so much weight.





Exam:


Temperature is 36.5, heart rate 90, blood pressure 119/75.  Respirations 18.  

93% on room air.


He is 63 kg.  He is walking in his room, mobile, also walking down the hallway 

toward the exit door at the end of our second story hallway from ICU to outside.

 He is eating 100% of his food.  He tells me that he does not think it tastes 

bad and it does not "taste funny".


Neck is supple


Lungs are clear to auscultation and percussion without any respiratory distress


Regular rate and rhythm


Abdomen is soft, hypoactive bowel sounds


Extremities have no edema


Neurologically reflexes are normal, strength is normal, ambulation is normal 

with no ataxia.  He is able to feed himself, get to the bathroom by himself, and

is oriented to person place and time.





Lab:


Sodium 132, potassium 2.2.  I have written for 120 mEq rider.  Creatinine is 

1.8.





Assessment/plan





1.  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  When I started him on 

spironolactone, magnesium, calcium gluconate, salt tablets on a regular basis 

March 17, his potassium seems to have improved.





Plan:


Continue all the above medications.  Reassess after 120 mg once a potassium.





2.  Acute kidney injury.





I have been postulating that he was losing weight due to not eating very much.  

I did not realize that he is not eating and drinking water had to do with his 

delusional thought process that he was being poisoned.  He may be approaching 

the point where he is a danger to himself because of his delusional thought 

process.








3.  delusional and paranoid thought process


He may have schizophrenia.  He may have had a for quite some time because in the

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  Sometimes he takes 

his Abilify sometimes he does not.  He is very cagey, does not want to share 

with us any of the information with those visits with mental health providers.  

His mom is the one that is been the most informative.  I have spoken to social 

work, and we may have to start the process of getting him evaluated if he really

does continue to not eat or drink when he leaves the hospital

## 2023-03-20 LAB
ANION GAP SERPL CALCULATED.4IONS-SCNC: 10 MMOL/L (ref 6–13)
BUN SERPL-MCNC: 20 MG/DL (ref 6–20)
CA-I SERPL ISE-MCNC: 1.18 MMOL/L (ref 1.15–1.33)
CALCIUM UR-MCNC: 8.9 MG/DL (ref 8.5–10.3)
CHLORIDE SERPL-SCNC: 104 MMOL/L (ref 101–111)
CO2 SERPL-SCNC: 16 MMOL/L (ref 21–32)
CREAT SERPLBLD-SCNC: 1.8 MG/DL (ref 0.6–1.2)
GFRSERPLBLD MDRD-ARVRAT: 43 ML/MIN/{1.73_M2} (ref 89–?)
GLUCOSE SERPL-MCNC: 97 MG/DL (ref 70–100)
MAGNESIUM SERPL-MCNC: 2.4 MG/DL (ref 1.7–2.8)
PH BLDV: 7.32 [PH] (ref 7.31–7.41)
PHOSPHATE BLD-MCNC: 4.1 MG/DL (ref 2.5–4.6)
POTASSIUM SERPL-SCNC: 2 MMOL/L (ref 3.5–5)
SODIUM SERPLBLD-SCNC: 130 MMOL/L (ref 135–145)

## 2023-03-20 RX ADMIN — POTASSIUM CHLORIDE SCH MEQ: 20 SOLUTION ORAL at 13:07

## 2023-03-20 RX ADMIN — SPIRONOLACTONE SCH MG: 25 TABLET, FILM COATED ORAL at 08:02

## 2023-03-20 RX ADMIN — SODIUM CHLORIDE SCH MLS/HR: 9 INJECTION, SOLUTION INTRAVENOUS at 04:24

## 2023-03-20 RX ADMIN — Medication SCH MG: at 21:19

## 2023-03-20 RX ADMIN — SODIUM CHLORIDE TAB 1 GM SCH GM: 1 TAB at 21:19

## 2023-03-20 RX ADMIN — POTASSIUM CHLORIDE SCH MLS/HR: 7.46 INJECTION, SOLUTION INTRAVENOUS at 04:24

## 2023-03-20 RX ADMIN — POTASSIUM CHLORIDE SCH MEQ: 20 SOLUTION ORAL at 17:27

## 2023-03-20 RX ADMIN — SODIUM CHLORIDE, PRESERVATIVE FREE PRN ML: 5 INJECTION INTRAVENOUS at 21:22

## 2023-03-20 RX ADMIN — SODIUM CHLORIDE, PRESERVATIVE FREE SCH ML: 5 INJECTION INTRAVENOUS at 17:27

## 2023-03-20 RX ADMIN — HEPARIN SODIUM SCH: 5000 INJECTION, SOLUTION INTRAVENOUS; SUBCUTANEOUS at 08:03

## 2023-03-20 RX ADMIN — POTASSIUM CHLORIDE SCH MLS/HR: 7.46 INJECTION, SOLUTION INTRAVENOUS at 00:13

## 2023-03-20 RX ADMIN — PANTOPRAZOLE SODIUM SCH MG: 40 TABLET, DELAYED RELEASE ORAL at 06:01

## 2023-03-20 RX ADMIN — SODIUM CHLORIDE TAB 1 GM SCH GM: 1 TAB at 13:07

## 2023-03-20 RX ADMIN — HEPARIN SODIUM SCH: 5000 INJECTION, SOLUTION INTRAVENOUS; SUBCUTANEOUS at 21:20

## 2023-03-20 RX ADMIN — ACETAMINOPHEN PRN MG: 325 TABLET ORAL at 22:17

## 2023-03-20 RX ADMIN — POTASSIUM CHLORIDE SCH MLS/HR: 7.46 INJECTION, SOLUTION INTRAVENOUS at 03:25

## 2023-03-20 RX ADMIN — Medication SCH MG: at 06:01

## 2023-03-20 RX ADMIN — SODIUM CHLORIDE TAB 1 GM SCH GM: 1 TAB at 06:01

## 2023-03-20 RX ADMIN — POTASSIUM CHLORIDE SCH MEQ: 20 SOLUTION ORAL at 08:02

## 2023-03-20 RX ADMIN — POTASSIUM CHLORIDE SCH MLS/HR: 7.46 INJECTION, SOLUTION INTRAVENOUS at 01:13

## 2023-03-20 RX ADMIN — SODIUM CHLORIDE SCH: 9 INJECTION, SOLUTION INTRAVENOUS at 01:05

## 2023-03-20 RX ADMIN — POTASSIUM CHLORIDE SCH MLS/HR: 7.46 INJECTION, SOLUTION INTRAVENOUS at 02:21

## 2023-03-20 RX ADMIN — Medication SCH MG: at 13:07

## 2023-03-20 RX ADMIN — POTASSIUM CHLORIDE SCH MEQ: 20 SOLUTION ORAL at 21:19

## 2023-03-20 RX ADMIN — SODIUM CHLORIDE, PRESERVATIVE FREE SCH ML: 5 INJECTION INTRAVENOUS at 08:03

## 2023-03-20 NOTE — PROVIDER PROGRESS NOTE
Progress Note





March 20, 2023





12:25 PM





Juan is exceedingly agitated today. His nurse put his morning medications next

to him so that he can take them after breakfast.  He became argumentative, asked

her to please leave the room, and accused her of trying to harm him.  This is 

the same nurse he has had for several days now. He is now convinced that his 

nurse is the same face that he sees coming into his bedroom at night when he is 

at home.  That face steals his medication.  That person also injects him with 

sharp needles filled with poison to kill him.  He endorses the fact that he 

thinks he is being poisoned at home and that is why he does not want to eat the 

food or drink the water.  This is in concordance with what his mother said.  Mom

said that he had bought some water to drink because he did not want to drink the

water at home.  But then after a few sips of water he was convinced that was 

poisoning him as well so he cut open all those bottles of water to let them 

drain.  Which then leads me to understand why he was in renal failure when he 

came in.  He is hostile to my PA student.  My PA student is standing quietly 

next to me, listening to this conversation and he accuses the PA student of 

judging him, and saying "I am not crazy, I am a smart person".  We have heard 

him speaking to someone in the room.  When we go in the room to check to see who

is with there is no one there.  He will initially talk about a person that we 

cannot see but then he backtracks and becomes very guarded and say there is no o

ne there and that we are imagining that he is speaking to someone.  Today he did

ask me for help.  He says he does not want to live with his mother and father 

anymore.  He wants to go to a safe place.  He wants to be somewhere where he can

get help.  When I asked him if he wants to transfer to another facility that 

would help him, he said that that was okay.  He says that there has to be other 

hospitals that can help him with his problem.  But he will not tell me what "the

problem" is. He also tells me that he desperately wants to leave his parents 

house.  "I am a grown man and I need to be independent" and would like help to 

live independently.  But he says that he just cannot fill out the paperwork.  It

is overwhelming and he just cannot fill out the paperwork that would moving 

forward with application for subsidized housing.





During his stay here, he has been afebrile.  Vitals have been stable except for 

one evening where he had second-degree AV block.  He was asymptomatic with that.

 He denies cough, fever, chest pain, chest congestion, abdominal pain, diarrhea,

urgency, frequency.





Exam: Temperature 36.8, heart rate 87, blood pressure 126/87, respirations 12, 

100% on room air


5 feet 8 inches tall, 63.5 kg.  


When I first met Juan, he was 96 to 97 kg.  That was in 2017.  In June 2022 he

is 85 kg.  In October 2022 he 71 kg.  With this admission he is 64 kg.  And 

today he is 63.5.  So he is losing weight with his delusions that he is being 

poisoned and he is not eating enough.  


Neck supple, shotty adenopathy, no JVD


Lungs are clear to auscultation and percussion.  No tachypnea.


Regular rate and rhythm.


Abdomen is soft, nontender, normal bowel sounds.  Last bowel movement was 

yesterday.  He is eating up to 100% of his food here.  But then this morning he 

said he is suspicious about his nurse poisoning him and he only ate 2-3 bites of

breakfast.


Extremities are without edema.


Neurologically he is alert, oriented to person place and time.  However 

delusions and paranoia are very severe today.  He is able to follow commands 

when he is not angry.  He is able to be prompted.  No focal deficits. He is able

to go from supine to sitting, sitting to standing.  Walk to the bathroom on his 

own.  No ataxia, no loss of balance.  He is feeding himself, and dressing 

himself.





Lab:


Sodium 130.  He was admitted at a sodium of 122 and that has been gradually 

improving.


Potassium finally got above undetectable on March 18.  I am giving him 120-160 

kevin equivalents of potassium a day and he is staying at 2.0-2.4.  Today he is 

2.0.  I am getting his potassium up by giving him spironolactone as well as 

potassium orally and potassium IV.


Creatinine was 3.2 on admission reflecting his lack of oral intake because of 

his paranoia and creatinine is 1.8 today.


White cell count was last checked March 15 at 39,000.





Assessment/plan





1.  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  I spoke to one of his nephrologists.

 Unfortunately, the patient has hopped around depending on whether he likes the 

kidney doctor or not.  He is supposed to be seeing Dr. Macdonald but has been 

very noncompliant with that over the last year or so.  He really liked Dr. Vasquez 

and I was able to speak to Dr. Vasquez last week.   He confirmed that it is 

Gittleman syndrome.  It is not adrenal insufficiency.  So I started him on 

spironolactone, magnesium, calcium gluconate, salt tablets on a regular basis.  

The next day is when his potassium at least came up above 1.5.  Other than 

continuing to keep on giving him potassium, Dr. Vasquez does not feel we need to do

anything more in management of his disease.  Noncompliance with his meds has 

definitely resulted in hospital admissions on a recurrent basis.  This is the 

first admission where we have seen arrhythmias of 2nd degree block when his K 

was <1.5.  





Plan:


Another potassium rider of 40 mEq over 4 hours, and he will probably receive 2 

or 3 of those today.  He also asked me to please increase his potassium by mouth

to 4 times a day and I have done that as well.





2.  Acute kidney injury





Due to dehydration and lack of oral intake.  That is due to paranoia and 

delusions.  Gradually improving with this day and IV hydration.  While here he 

has been eating normally and drinking water normally until today.





3.  Delusional and paranoid thought process





He exhibits signs and symptoms of schizophrenia.  He has exhibited these thought

process issues for probably 3 or 4 years now. But they were mild and we felt he 

was not a danger to himself other than with his noncompliance with nephrology 

followup or running out of med.   He would leave AMA but was relatively safe 

with his parents.  I am now starting to get the impression that his thought 

process a danger to himself.  He is not eating or drinking and has lost a 

substantial amount of weight.  Has put himself into renal failure.  And today it

escalated to agitation and thinking that his nurse was out to kill him.  He 

wants help.  He is not able to verbalize what kind of help he wants.  I asked 

for social work to see him and she did feel that he needs to be evaluated by 

[facility].  So I am asking [facility] to come evaluate the patient.  He cannot go to a 

regular mental health facility.  Juan has medical needs that require his 

potassium to be monitored and I do not think a regular inpatient mental health 

facility can do that.





4.  Elevated white cell count.  His white cell count has been elevated on 

numerous occasions over the years.  I have never been clear about why since they

were not associated with infection.  He has not had pneumonia, UTI, or abdominal

pain to indicate that there was a infectious process.  I have had pathology 

review his smear twice now and there is no leukemic cells.  Pathology is stated 

as the patient continues to have chronically elevated white cell count without 

any evidence of infection or lymphoma or leukemia, that he may be a candidate 

for bone marrow biopsy.  While this is definitely an ongoing problem that needs 

to be addressed, I do not think it needs to be acutely addressed during this 

hospitalization or while we address his acute needs for psychiatric disorder.  

Once his psychiatric disorder is stabilized, then he may be able to see 

hematology to be evaluated for a possible bone marrow biopsy.

## 2023-03-21 LAB
ANION GAP SERPL CALCULATED.4IONS-SCNC: 15 MMOL/L (ref 6–13)
BASOPHILS # BLD MANUAL: 0.2 10^3/UL (ref 0–0.1)
BASOPHILS NFR BLD AUTO: 0.3 %
BASOPHILS NFR SPEC MANUAL: 1 %
BUN SERPL-MCNC: 22 MG/DL (ref 6–20)
CALCIUM UR-MCNC: 8.9 MG/DL (ref 8.5–10.3)
CHLORIDE SERPL-SCNC: 98 MMOL/L (ref 101–111)
CO2 SERPL-SCNC: 18 MMOL/L (ref 21–32)
CREAT SERPLBLD-SCNC: 1.7 MG/DL (ref 0.6–1.2)
EOSINOPHIL # BLD MANUAL: 0 10^3/UL (ref 0–0.7)
EOSINOPHIL NFR BLD AUTO: 1 %
ERYTHROCYTE [DISTWIDTH] IN BLOOD BY AUTOMATED COUNT: 15.3 % (ref 12–15)
GFRSERPLBLD MDRD-ARVRAT: 46 ML/MIN/{1.73_M2} (ref 89–?)
GLUCOSE SERPL-MCNC: 104 MG/DL (ref 70–100)
HCT VFR BLD AUTO: 48 % (ref 42–52)
HGB UR QL STRIP: 16.1 G/DL (ref 14–18)
LYMPH ABN NFR BLD MANUAL: 0 %
LYMPHOBLASTS # BLD: 24 %
LYMPHOCYTES # BLD MANUAL: 5.1 10^3/UL (ref 1.5–3.5)
LYMPHOCYTES NFR BLD AUTO: 20.1 %
MAGNESIUM SERPL-MCNC: 1.6 MG/DL (ref 1.7–2.8)
MAGNESIUM SERPL-MCNC: 1.8 MG/DL (ref 1.7–2.8)
MANUAL DIF COMMENT BLD-IMP: (no result)
MCH RBC QN AUTO: 29.5 PG (ref 27–31)
MCHC RBC AUTO-ENTMCNC: 33.5 G/DL (ref 32–36)
MCV RBC AUTO: 88.1 FL (ref 80–94)
METAMYELOCYTES NFR BLD: 3 %
MONOCYTES # BLD MANUAL: 1.7 10^3/UL (ref 0–1)
MONOCYTES NFR BLD AUTO: 4.3 %
MYELOCYTES NFR BLD: 13 %
NEUTROPHILS # SNV AUTO: 21.3 X10^3/UL (ref 4.8–10.8)
NEUTROPHILS NFR BLD AUTO: 57.4 %
NEUTROPHILS NFR BLD MANUAL: 10.9 10^3/UL (ref 1.5–6.6)
NEUTS BAND NFR BLD: 12 %
PLAT MORPH BLD: (no result)
POTASSIUM SERPL-SCNC: 1.9 MMOL/L (ref 3.5–5)
POTASSIUM SERPL-SCNC: 2.5 MMOL/L (ref 3.5–5)
RBC MAR: 5.45 10^6/UL (ref 4.7–6.1)
SODIUM SERPLBLD-SCNC: 131 MMOL/L (ref 135–145)

## 2023-03-21 RX ADMIN — SODIUM CHLORIDE TAB 1 GM SCH GM: 1 TAB at 13:34

## 2023-03-21 RX ADMIN — POTASSIUM CHLORIDE SCH MLS/HR: 7.46 INJECTION, SOLUTION INTRAVENOUS at 09:15

## 2023-03-21 RX ADMIN — POTASSIUM CHLORIDE SCH MLS/HR: 7.46 INJECTION, SOLUTION INTRAVENOUS at 22:48

## 2023-03-21 RX ADMIN — ACETAMINOPHEN PRN MG: 325 TABLET ORAL at 15:07

## 2023-03-21 RX ADMIN — POTASSIUM CHLORIDE SCH MLS/HR: 7.46 INJECTION, SOLUTION INTRAVENOUS at 11:15

## 2023-03-21 RX ADMIN — SODIUM CHLORIDE, PRESERVATIVE FREE PRN ML: 5 INJECTION INTRAVENOUS at 20:30

## 2023-03-21 RX ADMIN — Medication SCH MG: at 13:34

## 2023-03-21 RX ADMIN — Medication SCH MG: at 21:44

## 2023-03-21 RX ADMIN — POTASSIUM CHLORIDE SCH MEQ: 20 SOLUTION ORAL at 08:28

## 2023-03-21 RX ADMIN — SPIRONOLACTONE SCH MG: 25 TABLET, FILM COATED ORAL at 08:29

## 2023-03-21 RX ADMIN — SODIUM CHLORIDE SCH: 9 INJECTION, SOLUTION INTRAVENOUS at 16:25

## 2023-03-21 RX ADMIN — PANTOPRAZOLE SODIUM SCH MG: 40 TABLET, DELAYED RELEASE ORAL at 06:16

## 2023-03-21 RX ADMIN — POTASSIUM CHLORIDE SCH MLS/HR: 7.46 INJECTION, SOLUTION INTRAVENOUS at 21:44

## 2023-03-21 RX ADMIN — SODIUM CHLORIDE, PRESERVATIVE FREE SCH ML: 5 INJECTION INTRAVENOUS at 08:38

## 2023-03-21 RX ADMIN — SODIUM CHLORIDE TAB 1 GM SCH GM: 1 TAB at 06:16

## 2023-03-21 RX ADMIN — SODIUM CHLORIDE, PRESERVATIVE FREE SCH ML: 5 INJECTION INTRAVENOUS at 16:27

## 2023-03-21 RX ADMIN — HEPARIN SODIUM SCH: 5000 INJECTION, SOLUTION INTRAVENOUS; SUBCUTANEOUS at 20:28

## 2023-03-21 RX ADMIN — SODIUM CHLORIDE SCH: 9 INJECTION, SOLUTION INTRAVENOUS at 05:10

## 2023-03-21 RX ADMIN — Medication SCH MG: at 20:29

## 2023-03-21 RX ADMIN — SODIUM CHLORIDE TAB 1 GM SCH GM: 1 TAB at 21:44

## 2023-03-21 RX ADMIN — POTASSIUM CHLORIDE SCH MEQ: 20 SOLUTION ORAL at 16:27

## 2023-03-21 RX ADMIN — Medication SCH MG: at 06:16

## 2023-03-21 RX ADMIN — POTASSIUM CHLORIDE SCH MLS/HR: 7.46 INJECTION, SOLUTION INTRAVENOUS at 23:54

## 2023-03-21 RX ADMIN — POTASSIUM CHLORIDE SCH MLS/HR: 7.46 INJECTION, SOLUTION INTRAVENOUS at 07:06

## 2023-03-21 RX ADMIN — HEPARIN SODIUM SCH UNIT: 5000 INJECTION, SOLUTION INTRAVENOUS; SUBCUTANEOUS at 08:37

## 2023-03-21 RX ADMIN — POTASSIUM CHLORIDE SCH MLS/HR: 7.46 INJECTION, SOLUTION INTRAVENOUS at 08:15

## 2023-03-21 RX ADMIN — POTASSIUM CHLORIDE SCH MEQ: 20 SOLUTION ORAL at 13:07

## 2023-03-21 RX ADMIN — POTASSIUM CHLORIDE SCH MLS/HR: 7.46 INJECTION, SOLUTION INTRAVENOUS at 10:19

## 2023-03-21 RX ADMIN — POTASSIUM CHLORIDE SCH MLS/HR: 7.46 INJECTION, SOLUTION INTRAVENOUS at 20:30

## 2023-03-21 RX ADMIN — POTASSIUM CHLORIDE SCH MLS/HR: 7.46 INJECTION, SOLUTION INTRAVENOUS at 13:15

## 2023-03-21 RX ADMIN — POTASSIUM CHLORIDE SCH MLS/HR: 7.46 INJECTION, SOLUTION INTRAVENOUS at 15:22

## 2023-03-21 RX ADMIN — POTASSIUM CHLORIDE SCH MLS/HR: 7.46 INJECTION, SOLUTION INTRAVENOUS at 14:17

## 2023-03-21 RX ADMIN — SODIUM CHLORIDE, PRESERVATIVE FREE SCH: 5 INJECTION INTRAVENOUS at 05:10

## 2023-03-21 RX ADMIN — POTASSIUM CHLORIDE SCH MEQ: 20 SOLUTION ORAL at 20:29

## 2023-03-21 NOTE — PROVIDER PROGRESS NOTE
Subjective





- Subjective


Pt reports feeling: No change (He is minimally interactive, is staring at once 

spot on his meal tray. He later told the charge nurse Colin that he suspects his 

nurse was trying to poison him because something fell into a coup of liquid 

which the nurse Ingrid tried to fish out, then left him  the cup with the liquid 

to consume.)





Objective





- Vital Signs/Intake & Output


Intake & Output: 





                                 Intake & Output











 03/18/23 03/19/23 03/20/23 03/21/23





 23:59 23:59 23:59 23:59


 


Intake Total 4594.583 6488.751 5345.000 2713.333


 


Output Total 2575 1300 480 


 


Balance 2019.583 5188.751 4865.000 2713.333














- Objective


General Appearance: positive: No acute distress, Alert


Eyes Bilateral: positive: Normal inspection


ENT: positive: ENT inspection nml


Neck: positive: Nml inspection


Respiratory: positive: No respiratory distress


Cardiovascular: positive: Regular rate & rhythm


Abdomen: positive: No distention


Skin: positive: Warm, Dry, Other (Tattooed)


Extremities: positive: Non-tender, No pedal edema


Neurologic/Psychiatric: positive: Oriented x3, Other (Flat affect, minimally 

interactive)





- Lab Results


Fish Bones: 


                                 03/21/23 05:43





                                 03/22/23 07:09


Other Labs: 





                               Lab Results x24hrs











  03/21/23 03/21/23 Range/Units





  05:43 05:43 


 


WBC  21.3 H   (4.8-10.8)  x10^3/uL


 


RBC  5.45   (4.70-6.10)  10^6/uL


 


Hgb  16.1   (14.0-18.0)  g/dL


 


Hct  48.0   (42.0-52.0)  %


 


MCV  88.1   (80.0-94.0)  fL


 


MCH  29.5   (27.0-31.0)  pg


 


MCHC  33.5   (32.0-36.0)  g/dL


 


RDW  15.3 H   (12.0-15.0)  %


 


Neut # (Auto)  Not Reportable   


 


Lymph # (Auto)  Not Reportable   


 


Mono # (Auto)  Not Reportable   


 


Eos # (Auto)  Not Reportable   


 


Baso # (Auto)  Not Reportable   


 


Absolute Nucleated RBC  Not Reportable   


 


Total Counted  100   


 


Band Neuts % (Manual)  12 H  (0 - 10) %


 


Abnorm Lymph % (Manual)  0   %


 


Metamyelocytes %  3 H  ( - 0) %


 


Myelocytes %  13 H  ( - 0) %


 


Nucleated RBC %  Not Reportable   


 


Neutrophils # (Manual)  10.9 H   (1.5-6.6)  10^3/uL


 


Lymphocytes # (Manual)  5.1 H   (1.5-3.5)  10^3/uL


 


Monocytes # (Manual)  1.7 H   (0.0-1.0)  10^3/uL


 


Eosinophils # (Manual)  0.0   (0-0.7)  10^3/uL


 


Basophils # (Manual)  0.2 H   (0-0.1)  10^3/uL


 


Differential Comment  MANUAL DIFFERENTIAL   


 


Platelet Morphology  PLATELET CLUMPING   (NORMAL)  


 


Sodium   131 L  (135-145)  mmol/L


 


Potassium   1.9 L*  (3.5-5.0)  mmol/L


 


Chloride   98 L  (101-111)  mmol/L


 


Carbon Dioxide   18 L  (21-32)  mmol/L


 


Anion Gap   15.0 H  (6-13)  


 


BUN   22 H  (6-20)  mg/dL


 


Creatinine   1.7 H  (0.6-1.2)  mg/dL


 


Estimated GFR (MDRD)   46 L  (>89)  


 


Glucose   104 H  ()  mg/dL


 


Calcium   8.9  (8.5-10.3)  mg/dL


 


Magnesium   1.8  (1.7-2.8)  mg/dL














Sepsis Event Note (H)





- Evaluation


Current Stage of Sepsis: Ruled out





Assessment/Plan





- Problem List


(1) Hypokalemia


Impression: 


Noncompliance with his meds has definitely resulted in hospital admissions on a 

recurrent basis.  


The last Hospitalist spoke to one of his nephrologists.  Unfortunately, the 

patient has hopped around depending on whether he likes the kidney doctor or 

not.  He is supposed to be seeing Dr. Macdonald but has been very noncompliant 

with that over the last year or so.  He really liked Dr. Vasquez and I was able to 

speak to Dr. Vasquez last week.   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  It 

is not adrenal insufficiency.  So he was started him on spironolactone, 

magnesium, calcium gluconate, salt tablets on a regular basis.  The next day is 

when his potassium at least came up above 1.5.  Other than continuing to keep on

giving him potassium, Dr. Vasquez does not feel we need to do anything more in 

management of his disease.  This is the first admission where we have seen 

arrhythmias of 1st degree and possibly 2nd degree block, when his K was <1.5.  





Plan:


Continue with iv potassium riders of 40 mEq over 4 hour.  


Cont his potassium by mouth to 4 times a day


Today the 40 mill equivalents p.o. 4 times daily will be increased to 50 mEq 

p.o. 4 times daily


Continue on telemetry


Remain in the ICU until he has a more stabilized Potassium closer to 3











(2) Gitelman syndrome


Impression: 


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 





3.  Acute kidney injury


Due to dehydration and lack of oral intake, which was due to paranoia and delu

sions. Labs were all reviewed. Gradually improving BUN/creat with po and IV 

hydration.  


Plan:


When nephrotoxins


Follow be MFP daily





4.  Psychotic disorder with delusions


He exhibits signs and symptoms of schizophrenia.  He has exhibited these thought

process issues for probably 3 or 4 years now. But they were mild and we felt he 

was not a danger to himself other than with his noncompliance with nephrology 

followup or running out of med.   He would leave AMA but was relatively safe 

with his parents.  I am now starting to get the impression that his thought 

process a danger to himself.  He is not eating or drinking and has lost a 

substantial amount of weight.  Has put himself into renal failure.  And today it

escalated to agitation and thinking that his nurse was out to kill him.  He 

wants help.  He is not able to verbalize what kind of help he wants.  I asked 

for social work to see him and she did feel that he needs to be evaluated by 

DCR.  So I am asking DCR to come evaluate the patient.  He cannot go to a 

regular mental health facility.  Juan has medical needs that require his 

potassium to be monitored and I do not think a regular inpatient mental health 

facility can do that.


Plan:


Possible transfer to an inpatient psychiatric unit, once he is medically 

stabilized, or home with family plus outpt psych





5. Leukocytosis


His white cell count has been elevated on numerous occasions over the years.  I 

have never been clear about why since they were not associated with infection.  

He has not had pneumonia, UTI, or abdominal pain to indicate that there was a 

infectious process.  I have had pathology review his smear twice now and there 

is no leukemic cells.  Pathology is stated as the patient continues to have 

chronically elevated white cell count without any evidence of infection or 

lymphoma or leukemia, that he may be a candidate for bone marrow biopsy.  


Plan:


While this is definitely an ongoing problem that needs to be addressed, I do not

think it needs to be acutely addressed during this hospitalization or while we 

address his acute needs for psychiatric disorder.  Once his psychiatric disorder

is stabilized, then he may be able to see hematology to be evaluated for a 

possible bone marrow biopsy.





6. Severe protein calorie malnutrition


The patient has nutritional intake of less than 50% of recommended for 2 weeks 

or more, a weight loss of 19% in the past 6 months (from 77 down to 62 kg) which

is secondary to decreased oral intake of food beverages


Plan:


Supplemental high-calorie and protein drinks are being offered

## 2023-03-22 LAB
ANION GAP SERPL CALCULATED.4IONS-SCNC: 12 MMOL/L (ref 6–13)
BUN SERPL-MCNC: 20 MG/DL (ref 6–20)
CALCIUM UR-MCNC: 8.8 MG/DL (ref 8.5–10.3)
CHLORIDE SERPL-SCNC: 104 MMOL/L (ref 101–111)
CO2 SERPL-SCNC: 17 MMOL/L (ref 21–32)
CORTIS F 24H UR-MRATE: 44 UG/24 HR (ref 5–64)
CORTIS F UR-MCNC: 7 UG/L
CREAT SERPLBLD-SCNC: 1.6 MG/DL (ref 0.6–1.2)
GFRSERPLBLD MDRD-ARVRAT: 50 ML/MIN/{1.73_M2} (ref 89–?)
GLUCOSE SERPL-MCNC: 107 MG/DL (ref 70–100)
POTASSIUM SERPL-SCNC: 1.9 MMOL/L (ref 3.5–5)
SODIUM SERPLBLD-SCNC: 133 MMOL/L (ref 135–145)

## 2023-03-22 RX ADMIN — SODIUM CHLORIDE SCH MLS/HR: 9 INJECTION, SOLUTION INTRAVENOUS at 20:57

## 2023-03-22 RX ADMIN — SODIUM CHLORIDE SCH MLS/HR: 9 INJECTION, SOLUTION INTRAVENOUS at 12:27

## 2023-03-22 RX ADMIN — SODIUM CHLORIDE TAB 1 GM SCH GM: 1 TAB at 22:04

## 2023-03-22 RX ADMIN — POTASSIUM CHLORIDE SCH MLS/HR: 7.46 INJECTION, SOLUTION INTRAVENOUS at 01:00

## 2023-03-22 RX ADMIN — POTASSIUM CHLORIDE SCH MEQ: 20 SOLUTION ORAL at 08:24

## 2023-03-22 RX ADMIN — SODIUM CHLORIDE SCH MLS/HR: 9 INJECTION, SOLUTION INTRAVENOUS at 16:33

## 2023-03-22 RX ADMIN — Medication SCH MG: at 06:56

## 2023-03-22 RX ADMIN — SODIUM CHLORIDE TAB 1 GM SCH GM: 1 TAB at 13:34

## 2023-03-22 RX ADMIN — POTASSIUM CHLORIDE SCH MLS/HR: 7.46 INJECTION, SOLUTION INTRAVENOUS at 02:03

## 2023-03-22 RX ADMIN — POTASSIUM CHLORIDE SCH MLS/HR: 7.46 INJECTION, SOLUTION INTRAVENOUS at 09:24

## 2023-03-22 RX ADMIN — POTASSIUM CHLORIDE SCH MLS/HR: 7.46 INJECTION, SOLUTION INTRAVENOUS at 11:30

## 2023-03-22 RX ADMIN — SPIRONOLACTONE SCH MG: 25 TABLET, FILM COATED ORAL at 08:24

## 2023-03-22 RX ADMIN — ACETAMINOPHEN PRN MG: 325 TABLET ORAL at 11:08

## 2023-03-22 RX ADMIN — SODIUM CHLORIDE TAB 1 GM SCH GM: 1 TAB at 06:56

## 2023-03-22 RX ADMIN — SODIUM CHLORIDE SCH: 9 INJECTION, SOLUTION INTRAVENOUS at 16:24

## 2023-03-22 RX ADMIN — SODIUM CHLORIDE SCH MLS/HR: 9 INJECTION, SOLUTION INTRAVENOUS at 16:47

## 2023-03-22 RX ADMIN — Medication SCH MG: at 11:12

## 2023-03-22 RX ADMIN — HEPARIN SODIUM SCH UNIT: 5000 INJECTION, SOLUTION INTRAVENOUS; SUBCUTANEOUS at 08:25

## 2023-03-22 RX ADMIN — SODIUM CHLORIDE, PRESERVATIVE FREE SCH ML: 5 INJECTION INTRAVENOUS at 03:16

## 2023-03-22 RX ADMIN — PANTOPRAZOLE SODIUM SCH MG: 40 TABLET, DELAYED RELEASE ORAL at 06:56

## 2023-03-22 RX ADMIN — POTASSIUM CHLORIDE SCH MEQ: 20 SOLUTION ORAL at 20:58

## 2023-03-22 RX ADMIN — POTASSIUM CHLORIDE SCH MEQ: 20 SOLUTION ORAL at 16:33

## 2023-03-22 RX ADMIN — Medication SCH MG: at 22:04

## 2023-03-22 RX ADMIN — Medication SCH MG: at 13:34

## 2023-03-22 RX ADMIN — POTASSIUM CHLORIDE SCH MLS/HR: 7.46 INJECTION, SOLUTION INTRAVENOUS at 10:29

## 2023-03-22 RX ADMIN — HEPARIN SODIUM SCH: 5000 INJECTION, SOLUTION INTRAVENOUS; SUBCUTANEOUS at 20:56

## 2023-03-22 RX ADMIN — POTASSIUM CHLORIDE SCH MEQ: 20 SOLUTION ORAL at 12:27

## 2023-03-22 RX ADMIN — SODIUM CHLORIDE, PRESERVATIVE FREE SCH: 5 INJECTION INTRAVENOUS at 16:25

## 2023-03-22 RX ADMIN — SODIUM CHLORIDE SCH: 9 INJECTION, SOLUTION INTRAVENOUS at 05:43

## 2023-03-22 RX ADMIN — Medication SCH MG: at 03:16

## 2023-03-22 RX ADMIN — POTASSIUM CHLORIDE SCH MLS/HR: 7.46 INJECTION, SOLUTION INTRAVENOUS at 08:25

## 2023-03-22 RX ADMIN — SODIUM CHLORIDE, PRESERVATIVE FREE SCH ML: 5 INJECTION INTRAVENOUS at 08:25

## 2023-03-22 NOTE — PROVIDER PROGRESS NOTE
Pt is here for HPV and MENACTRA   HPV was administered on left deltoid   NDC 5971-2567-30  LOT  [id number]  EXP 5/04/2021   Gardasil    MENACTRA was administered on right deltoid  NDC 22856-505-19  LOT  [id number]  EXP10/06/2019    [employer]    Given from [facility] stock       Subjective





- Subjective


Pt reports feeling: Improved (He is much more interactive today, does not have a

flat affect or furloughed brow like he is angry as he was yesterday)





Objective





- Vital Signs/Intake & Output


Vital Signs: 





                                   Vital Signs











  Temp Pulse Resp BP Pulse Ox


 


 03/22/23 11:31  37.0 C  97  14  131/85 H  98











Intake & Output: 





                                 Intake & Output











 03/19/23 03/20/23 03/21/23 03/22/23





 23:59 23:59 23:59 23:59


 


Intake Total 6488.751 5345.000 3253.333 3418.333


 


Output Total 1300 480  240


 


Balance 5188.751 4865.000 3253.333 3178.333














- Objective


General Appearance: positive: No acute distress, Alert


Eyes Bilateral: positive: Normal inspection


ENT: positive: ENT inspection nml, No signs of dehydration


Neck: positive: Nml inspection, No JVD


Respiratory: positive: No respiratory distress


Cardiovascular: positive: Regular rate & rhythm


Abdomen: positive: No distention


Skin: positive: Warm, Dry, Other (Tattooed)


Extremities: positive: Non-tender, No pedal edema


Neurologic/Psychiatric: positive: Oriented x3, Motor nml





- Lab Results


Fish Bones: 


                                 03/21/23 05:43





                                 03/22/23 07:09


Other Labs: 





                               Lab Results x24hrs











  03/22/23 03/21/23 Range/Units





  07:09 19:40 


 


Sodium  133 L   (135-145)  mmol/L


 


Potassium  1.9 L*  2.5 L*  (3.5-5.0)  mmol/L


 


Chloride  104   (101-111)  mmol/L


 


Carbon Dioxide  17 L   (21-32)  mmol/L


 


Anion Gap  12.0   (6-13)  


 


BUN  20   (6-20)  mg/dL


 


Creatinine  1.6 H   (0.6-1.2)  mg/dL


 


Estimated GFR (MDRD)  50 L   (>89)  


 


Glucose  107 H   ()  mg/dL


 


Calcium  8.8   (8.5-10.3)  mg/dL


 


Magnesium   1.6 L  (1.7-2.8)  mg/dL














Sepsis Event Note (H)





- Evaluation


Current Stage of Sepsis: Ruled out





Assessment/Plan





- Problem List


(1) Hypokalemia


Impression: 


Noncompliance with his meds has definitely resulted in hospital admissions on a 

recurrent basis for this man.  


The last Hospitalist spoke to one of his nephrologists. Unfortunately, the 

patient has hopped around depending on whether he likes the kidney doctor or 

not.  He is supposed to be seeing Dr. Macdonald but has been very noncompliant 

with that over the last year or so.  He really liked Dr. Vasquez who was reached.  

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  It is not adrenal 

insufficiency.  So he was started him on K, spironolactone, magnesium, calcium 

gluconate, and salt tablets on a regular basis.  


Since admission, he has had K, Mg and CVa replacwed but K still drops dailyto as

low as 1.5.  This is the first admission where we saw 1st degree and possibly 

2nd degree block, when his K was <1.5.  





Plan:


Continue with iv potassium riders of 40 mEq over 4 hour. Will order that x 2 (so

80 mEq iv) for a K of 1.9.  


Cont his potassium by mouth 4 times a day


Cont the increased K of 50 mEq p.o. 4 times daily


Continue on telemetry


Remain in the ICU until he has a more stabilized Potassium closer to 3





(2) Gitelman syndrome


Impression: 


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 





(3)  Acute kidney injury


Due to dehydration and lack of oral intake, which was due to paranoia of his 

food being poisoned and delusions/hallucinations. Labs were all reviewed. 

Gradually improving BUN/creat with po and IV hydration.  


Plan:


Avoid nephrotoxins


Follow be BMP daily





(4)  Psychotic disorder with delusions


He exhibited signs and symptoms of schizophrenia.  He has exhibited these 

thought process issues for probably 3 or 4 years now. But they were mild and on 

previous admissions, we felt he was not a danger to himself other than being his

noncompliance with nephrology followup or running out of meds.   He would leave 

AMA from here, but was relatively safe with his parents.  


It seems that his thought processes are a danger to himself.  He was not eating 

or drinking and has lost a substantial amount of weight.  He has this time put 

himself into renal failure.  And during this admission, it escalated to 

agitation and thinking that his nurse was out to kill him.  He wants help.  He 

is not able to verbalize what kind of help he wants.  We asked for social work 

to see him and SW did feel that he needed to be evaluated by DCR. So the last 

Hospitalist aske d for DCR to come evaluate the patient.  He cannot go to a 

regular mental health facility.  Juan has medical needs that require his 

potassium to be monitored and we do not think a regular inpatient mental health 

facility can do that, in fact they would not accept him until he is medically 

stable.


Plan:


He was on Abilify at home which is beng continued. 


He has hgad insomnia here, the past few nights and will start prn Ambien. Choice

of sleep med was discussed with pharmacist Ainsley.


Possible transfer to an inpatient psychiatric unit, once he is medically 

stabilized, or home with family plus outpt psych





(5) Leukocytosis


His white cell count has been elevated on numerous occasions over the years.  I 

have never been clear about why since they were not associated with infection.  

He has not had pneumonia, UTI, or abdominal pain to indicate that there was a 

infectious process.  I have had pathology review his smear twice now and there 

is no leukemic cells.  Pathology is stated as the patient continues to have 

chronically elevated white cell count without any evidence of infection or 

lymphoma or leukemia, that he may be a candidate for bone marrow biopsy.  


Plan:


While this is definitely an ongoing problem that needs to be addressed, I do not

think it needs to be acutely addressed during this hospitalization or while we 

address his acute needs for psychiatric disorder.  Once his psychiatric disorder

is stabilized, then he may be able to see hematology to be evaluated for a 

possible bone marrow biopsy.





(6) Severe protein calorie malnutrition


The patient has nutritional intake of less than 50% of recommended for 2 weeks 

or more, a weight loss of 19% in the past 6 months (from 77 down to 62 kg) which

is secondary to decreased oral intake of food beverages


Plan:


Supplemental high-calorie and protein drinks are being offered

## 2023-03-23 LAB
ANION GAP SERPL CALCULATED.4IONS-SCNC: 10 MMOL/L (ref 6–13)
BASOPHILS # BLD MANUAL: 0 10^3/UL (ref 0–0.1)
BASOPHILS NFR BLD AUTO: 1 %
BUN SERPL-MCNC: 19 MG/DL (ref 6–20)
CALCIUM UR-MCNC: 8.6 MG/DL (ref 8.5–10.3)
CHLORIDE SERPL-SCNC: 107 MMOL/L (ref 101–111)
CO2 SERPL-SCNC: 17 MMOL/L (ref 21–32)
CREAT SERPLBLD-SCNC: 1.4 MG/DL (ref 0.6–1.2)
EOSINOPHIL # BLD MANUAL: 0.1 10^3/UL (ref 0–0.7)
EOSINOPHIL NFR BLD AUTO: 1.5 %
ERYTHROCYTE [DISTWIDTH] IN BLOOD BY AUTOMATED COUNT: 15.6 % (ref 12–15)
GFRSERPLBLD MDRD-ARVRAT: 58 ML/MIN/{1.73_M2} (ref 89–?)
GLUCOSE SERPL-MCNC: 104 MG/DL (ref 70–100)
HCT VFR BLD AUTO: 45.9 % (ref 42–52)
HGB UR QL STRIP: 15.1 G/DL (ref 14–18)
LYMPH ABN NFR BLD MANUAL: 0 %
LYMPHOBLASTS # BLD: 23 %
LYMPHOCYTES # BLD MANUAL: 3.3 10^3/UL (ref 1.5–3.5)
LYMPHOCYTES NFR BLD AUTO: 25.5 %
MAGNESIUM SERPL-MCNC: 1.5 MG/DL (ref 1.7–2.8)
MANUAL DIF COMMENT BLD-IMP: (no result)
MCH RBC QN AUTO: 29.3 PG (ref 27–31)
MCHC RBC AUTO-ENTMCNC: 32.9 G/DL (ref 32–36)
MCV RBC AUTO: 89.1 FL (ref 80–94)
METAMYELOCYTES NFR BLD: 1 %
MONOCYTES # BLD MANUAL: 0.5 10^3/UL (ref 0–1)
MONOCYTES NFR BLD AUTO: 4.9 %
MYELOCYTES NFR BLD: 4 %
NEUTROPHILS # SNV AUTO: 13.7 X10^3/UL (ref 4.8–10.8)
NEUTROPHILS NFR BLD AUTO: 59.5 %
NEUTROPHILS NFR BLD MANUAL: 9 10^3/UL (ref 1.5–6.6)
NEUTS BAND NFR BLD: 7 %
PDW BLD AUTO: 8.2 FL (ref 7.4–11.4)
PLATELET # BLD: 509 10^3/UL (ref 130–450)
PLATELET BLD QL SMEAR: (no result)
POTASSIUM SERPL-SCNC: 2.3 MMOL/L (ref 3.5–5)
RBC MAR: 5.15 10^6/UL (ref 4.7–6.1)
SODIUM SERPLBLD-SCNC: 134 MMOL/L (ref 135–145)
VARIANT LYMPHS NFR BLD MANUAL: 1 %

## 2023-03-23 RX ADMIN — SODIUM CHLORIDE TAB 1 GM SCH GM: 1 TAB at 06:11

## 2023-03-23 RX ADMIN — Medication SCH MG: at 13:16

## 2023-03-23 RX ADMIN — PANTOPRAZOLE SODIUM SCH MG: 40 TABLET, DELAYED RELEASE ORAL at 06:11

## 2023-03-23 RX ADMIN — SODIUM CHLORIDE ONE MLS/HR: 9 INJECTION, SOLUTION INTRAVENOUS at 16:23

## 2023-03-23 RX ADMIN — SODIUM CHLORIDE, PRESERVATIVE FREE PRN ML: 5 INJECTION INTRAVENOUS at 07:21

## 2023-03-23 RX ADMIN — POTASSIUM CHLORIDE SCH MEQ: 20 SOLUTION ORAL at 08:06

## 2023-03-23 RX ADMIN — HEPARIN SODIUM SCH UNIT: 5000 INJECTION, SOLUTION INTRAVENOUS; SUBCUTANEOUS at 08:06

## 2023-03-23 RX ADMIN — Medication SCH MG: at 08:05

## 2023-03-23 RX ADMIN — SODIUM CHLORIDE, PRESERVATIVE FREE SCH ML: 5 INJECTION INTRAVENOUS at 01:32

## 2023-03-23 RX ADMIN — SODIUM CHLORIDE, PRESERVATIVE FREE SCH ML: 5 INJECTION INTRAVENOUS at 08:07

## 2023-03-23 RX ADMIN — HEPARIN SODIUM SCH: 5000 INJECTION, SOLUTION INTRAVENOUS; SUBCUTANEOUS at 22:52

## 2023-03-23 RX ADMIN — Medication SCH MG: at 22:53

## 2023-03-23 RX ADMIN — SODIUM CHLORIDE TAB 1 GM SCH GM: 1 TAB at 13:16

## 2023-03-23 RX ADMIN — SODIUM CHLORIDE, PRESERVATIVE FREE SCH ML: 5 INJECTION INTRAVENOUS at 16:23

## 2023-03-23 RX ADMIN — SPIRONOLACTONE SCH MG: 25 TABLET, FILM COATED ORAL at 08:06

## 2023-03-23 RX ADMIN — SODIUM CHLORIDE TAB 1 GM SCH GM: 1 TAB at 22:53

## 2023-03-23 RX ADMIN — POTASSIUM CHLORIDE SCH MEQ: 20 SOLUTION ORAL at 13:15

## 2023-03-23 RX ADMIN — Medication SCH MG: at 06:11

## 2023-03-23 RX ADMIN — POTASSIUM CHLORIDE SCH MEQ: 20 SOLUTION ORAL at 16:23

## 2023-03-23 RX ADMIN — POTASSIUM CHLORIDE SCH MEQ: 20 SOLUTION ORAL at 22:52

## 2023-03-23 RX ADMIN — SODIUM CHLORIDE ONE MLS/HR: 9 INJECTION, SOLUTION INTRAVENOUS at 16:26

## 2023-03-23 RX ADMIN — ACETAMINOPHEN PRN MG: 325 TABLET ORAL at 20:25

## 2023-03-23 NOTE — PROVIDER PROGRESS NOTE
Subjective





- Subjective


Pt reports feeling: Improved (He is again more interactive and has more facial 

expressions and not as stoic compared to 2 days ago)





Objective





- Vital Signs/Intake & Output


Vital Signs: 





                                   Vital Signs











  Pulse Resp BP Pulse Ox


 


 03/23/23 16:00  98  18  119/75  100











Intake & Output: 





                                 Intake & Output











 03/20/23 03/21/23 03/22/23 03/23/23





 23:59 23:59 23:59 23:59


 


Intake Total 5345.000 3253.333 6308.333 5440.667


 


Output Total 480  240 


 


Balance 4865.000 3253.333 6068.333 5440.667














- Objective


General Appearance: positive: No acute distress, Alert


Eyes Bilateral: positive: Normal inspection, EOMI


ENT: positive: ENT inspection nml, No signs of dehydration


Neck: positive: Nml inspection, No JVD


Respiratory: positive: No respiratory distress


Cardiovascular: positive: Regular rate & rhythm


Abdomen: positive: No distention


Skin: positive: Warm, Dry


Extremities: positive: No pedal edema


Neurologic/Psychiatric: positive: Oriented x3, Mood/affect nml





- Lab Results


Fish Bones: 


                                 03/23/23 04:55





                                 03/23/23 16:57


Other Labs: 





                               Lab Results x24hrs











  03/23/23 03/23/23 03/23/23 Range/Units





  16:57 15:02 04:55 


 


WBC     (4.8-10.8)  x10^3/uL


 


RBC     (4.70-6.10)  10^6/uL


 


Hgb     (14.0-18.0)  g/dL


 


Hct     (42.0-52.0)  %


 


MCV     (80.0-94.0)  fL


 


MCH     (27.0-31.0)  pg


 


MCHC     (32.0-36.0)  g/dL


 


RDW     (12.0-15.0)  %


 


Plt Count     (130-450)  10^3/uL


 


MPV     (7.4-11.4)  fL


 


Neut # (Auto)     


 


Lymph # (Auto)     


 


Mono # (Auto)     


 


Eos # (Auto)     


 


Baso # (Auto)     


 


Absolute Nucleated RBC     


 


Total Counted     


 


Band Neuts % (Manual)    (0 - 10) %


 


Reactive Lymphs % (Man)     %


 


Abnorm Lymph % (Manual)     %


 


Metamyelocytes %    ( - 0) %


 


Myelocytes %    ( - 0) %


 


Nucleated RBC %     


 


Neutrophils # (Manual)     (1.5-6.6)  10^3/uL


 


Lymphocytes # (Manual)     (1.5-3.5)  10^3/uL


 


Monocytes # (Manual)     (0.0-1.0)  10^3/uL


 


Eosinophils # (Manual)     (0-0.7)  10^3/uL


 


Basophils # (Manual)     (0-0.1)  10^3/uL


 


Differential Comment     


 


Platelet Estimate     (NORMAL)  


 


Sodium    134 L  (135-145)  mmol/L


 


Potassium  2.5 L*  2.7 L  2.3 L*  (3.5-5.0)  mmol/L


 


Chloride    107  (101-111)  mmol/L


 


Carbon Dioxide    17 L  (21-32)  mmol/L


 


Anion Gap    10.0  (6-13)  


 


BUN    19  (6-20)  mg/dL


 


Creatinine    1.4 H  (0.6-1.2)  mg/dL


 


Estimated GFR (MDRD)    58 L  (>89)  


 


Glucose    104 H  ()  mg/dL


 


Calcium    8.6  (8.5-10.3)  mg/dL


 


Magnesium    1.5 L  (1.7-2.8)  mg/dL














  03/23/23 Range/Units





  04:55 


 


WBC  13.7 H  (4.8-10.8)  x10^3/uL


 


RBC  5.15  (4.70-6.10)  10^6/uL


 


Hgb  15.1  (14.0-18.0)  g/dL


 


Hct  45.9  (42.0-52.0)  %


 


MCV  89.1  (80.0-94.0)  fL


 


MCH  29.3  (27.0-31.0)  pg


 


MCHC  32.9  (32.0-36.0)  g/dL


 


RDW  15.6 H  (12.0-15.0)  %


 


Plt Count  509 H  (130-450)  10^3/uL


 


MPV  8.2  (7.4-11.4)  fL


 


Neut # (Auto)  Not Reportable  


 


Lymph # (Auto)  Not Reportable  


 


Mono # (Auto)  Not Reportable  


 


Eos # (Auto)  Not Reportable  


 


Baso # (Auto)  Not Reportable  


 


Absolute Nucleated RBC  Not Reportable  


 


Total Counted  100  


 


Band Neuts % (Manual)  7 (0 - 10) %


 


Reactive Lymphs % (Man)  1  %


 


Abnorm Lymph % (Manual)  0  %


 


Metamyelocytes %  1 H ( - 0) %


 


Myelocytes %  4 H ( - 0) %


 


Nucleated RBC %  Not Reportable  


 


Neutrophils # (Manual)  9.0 H  (1.5-6.6)  10^3/uL


 


Lymphocytes # (Manual)  3.3  (1.5-3.5)  10^3/uL


 


Monocytes # (Manual)  0.5  (0.0-1.0)  10^3/uL


 


Eosinophils # (Manual)  0.1  (0-0.7)  10^3/uL


 


Basophils # (Manual)  0.0  (0-0.1)  10^3/uL


 


Differential Comment  MANUAL DIFFERENTIAL  


 


Platelet Estimate  INCREASED (>450,000)  (NORMAL)  


 


Sodium   (135-145)  mmol/L


 


Potassium   (3.5-5.0)  mmol/L


 


Chloride   (101-111)  mmol/L


 


Carbon Dioxide   (21-32)  mmol/L


 


Anion Gap   (6-13)  


 


BUN   (6-20)  mg/dL


 


Creatinine   (0.6-1.2)  mg/dL


 


Estimated GFR (MDRD)   (>89)  


 


Glucose   ()  mg/dL


 


Calcium   (8.5-10.3)  mg/dL


 


Magnesium   (1.7-2.8)  mg/dL














Sepsis Event Note (H)





- Evaluation


Current Stage of Sepsis: Ruled out





Assessment/Plan





- Problem List


(1) Hypokalemia


Impression: 


(1) Hypokalemia


Impression: 


Noncompliance with his meds has definitely resulted in hospital admissions on a 

recurrent basis for this man.  


The last Hospitalist spoke to one of his nephrologists. Unfortunately, the 

patient has hopped around depending on whether he likes the kidney doctor or 

not.  He is supposed to be seeing Dr. Macdonald but has been very noncompliant 

with that over the last year or so.  He really liked Dr. Vasquez who was reached.  

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  It is not adrenal 

insufficiency.  So he was started him on K, spironolactone, magnesium, calcium 

gluconate, and salt tablets on a regular basis.  


Since admission, he has had K, Mg and CVa replacwed but K still drops dailyto as

low as 1.5.  This is the first admission where we saw 1st degree and possibly 

2nd degree block, when his K was <1.5.  





Plan:


Continue with iv potassium riders of 40 mEq over 4 hour.


Cont his potassium by mouth 4 times a day at 50 mEq 


Continue on telemetry


Remain in the ICU until he has a more stabilized Potassium closer to 3


Since he appears to be more compliant with potassium intake when he is not as 

paranoid, we are also increasing his Abilify (see #4 below)





(2) Gitelman syndrome


Impression: 


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 





(3)  Acute kidney injury


Improving


Due to dehydration and lack of oral intake, which was due to paranoia of his 

food being poisoned and delusions/hallucinations. Labs were all reviewed. 

Gradually improving BUN/creat with po and IV hydration.  


Plan:


Avoid nephrotoxins


Follow be BMP daily





(4)  Psychotic disorder with delusions


He exhibited signs and symptoms of schizophrenia.  He has exhibited these 

thought process issues for probably 3 or 4 years now. But they were mild and on 

previous admissions, we felt he was not a danger to himself other than being his

noncompliance with nephrology followup or running out of meds.   He would leave 

[facility] from here, but was relatively safe with his parents.  


More recently, his thought processes might be a danger to himself:  He was not 

eating or drinking and has lost a substantial amount of weight.  


He was seen by social work today for repeat mental health eval and Honey RIGGS 

stated that he is not suicidal, has no ideations but continues to be paranoid. 

He will eat and drink only what his parents give hm because that is the only 

people he trusts, he said. Then when he does not take his Abilify he gets even 

more paranoid and does not take his potassium pills. So he has had repetitive 

hospitalizations with that scenario.


Plan:


He was on Abilify at home which was continued. 5 mg dose is extremely low.  We 

will increase this to 15 mg daily, given additional 10 mg dose today.  This was 

discussed with pharmacy and social


He has had insomnia here the past few nights. Ambien 5 mg was given yesterday 

with no effect. Will increase the dose to 10 mg every afternoon as needed.


Will try to get the telepsych consultation for psych medication recommendations,

not for transfer


Once he is medically stabilized plan will be to [facility] home with family plus get 

outpt psych. This was discussed with Honey in [facility] today.





(5) Leukocytosis


Improving


His white cell count has been elevated on numerous occasions over the years.  I 

have never been clear about why since they were not associated with infection.  

He has not had pneumonia, UTI, or abdominal pain to indicate that there was a 

infectious process.  I have had pathology review his smear twice now and there 

is no leukemic cells.  Pathology has stated as the patient continues to have 

chronically elevated white cell count without any evidence of infection or 

lymphoma or leukemia, that he may be a candidate for bone marrow biopsy.  


Plan:


While this is definitely an ongoing problem that needs to be addressed, I do not

think it needs to be acutely addressed during this hospitalization or while we 

address his acute needs for psychiatric disorder.  Once his psychiatric disorder

is stabilized, then he may be able to see hematology to be evaluated for a 

possible bone marrow biopsy.





(6) Severe protein calorie malnutrition


The patient has nutritional intake of less than 50% of recommended for 2 weeks 

or more, a weight loss of 19% in the past 6 months (from 77 down to 62 kg) which

is secondary to decreased oral intake of food beverages


Plan:


Supplemental high-calorie and protein drinks are being offered

## 2023-03-24 VITALS — SYSTOLIC BLOOD PRESSURE: 124 MMHG | DIASTOLIC BLOOD PRESSURE: 76 MMHG

## 2023-03-24 LAB
ANION GAP SERPL CALCULATED.4IONS-SCNC: 8 MMOL/L (ref 6–13)
BUN SERPL-MCNC: 22 MG/DL (ref 6–20)
CA-I SERPL ISE-MCNC: 1.13 MMOL/L (ref 1.15–1.33)
CALCIUM UR-MCNC: 8.3 MG/DL (ref 8.5–10.3)
CHLORIDE SERPL-SCNC: 110 MMOL/L (ref 101–111)
CO2 SERPL-SCNC: 19 MMOL/L (ref 21–32)
CREAT SERPLBLD-SCNC: 1.4 MG/DL (ref 0.6–1.2)
GFRSERPLBLD MDRD-ARVRAT: 58 ML/MIN/{1.73_M2} (ref 89–?)
GLUCOSE SERPL-MCNC: 100 MG/DL (ref 70–100)
MAGNESIUM SERPL-MCNC: 1.4 MG/DL (ref 1.7–2.8)
PH BLDV: 7.35 [PH] (ref 7.31–7.41)
POTASSIUM SERPL-SCNC: 2.1 MMOL/L (ref 3.5–5)
SODIUM SERPLBLD-SCNC: 137 MMOL/L (ref 135–145)

## 2023-03-24 RX ADMIN — ENOXAPARIN SODIUM SCH: 100 INJECTION SUBCUTANEOUS at 08:29

## 2023-03-24 RX ADMIN — Medication SCH MG: at 08:03

## 2023-03-24 RX ADMIN — ENOXAPARIN SODIUM SCH MG: 100 INJECTION SUBCUTANEOUS at 08:24

## 2023-03-24 RX ADMIN — SODIUM CHLORIDE, PRESERVATIVE FREE SCH ML: 5 INJECTION INTRAVENOUS at 00:07

## 2023-03-24 RX ADMIN — POTASSIUM CHLORIDE SCH MLS/HR: 7.46 INJECTION, SOLUTION INTRAVENOUS at 09:45

## 2023-03-24 RX ADMIN — SODIUM CHLORIDE, PRESERVATIVE FREE SCH ML: 5 INJECTION INTRAVENOUS at 08:24

## 2023-03-24 RX ADMIN — POTASSIUM CHLORIDE SCH MLS/HR: 7.46 INJECTION, SOLUTION INTRAVENOUS at 08:00

## 2023-03-24 RX ADMIN — POTASSIUM CHLORIDE SCH MLS/HR: 7.46 INJECTION, SOLUTION INTRAVENOUS at 10:50

## 2023-03-24 RX ADMIN — Medication SCH MG: at 05:41

## 2023-03-24 RX ADMIN — SODIUM CHLORIDE, PRESERVATIVE FREE PRN ML: 5 INJECTION INTRAVENOUS at 05:41

## 2023-03-24 RX ADMIN — MAGNESIUM SULFATE HEPTAHYDRATE SCH MLS/HR: 2 INJECTION, SOLUTION INTRAVENOUS at 05:40

## 2023-03-24 RX ADMIN — MAGNESIUM SULFATE HEPTAHYDRATE SCH MLS/HR: 2 INJECTION, SOLUTION INTRAVENOUS at 06:49

## 2023-03-24 RX ADMIN — POTASSIUM CHLORIDE SCH MLS/HR: 7.46 INJECTION, SOLUTION INTRAVENOUS at 11:53

## 2023-03-24 RX ADMIN — POTASSIUM CHLORIDE SCH MEQ: 20 SOLUTION ORAL at 12:02

## 2023-03-24 RX ADMIN — SPIRONOLACTONE SCH MG: 25 TABLET, FILM COATED ORAL at 08:02

## 2023-03-24 RX ADMIN — SODIUM CHLORIDE TAB 1 GM SCH GM: 1 TAB at 05:41

## 2023-03-24 RX ADMIN — POTASSIUM CHLORIDE SCH MLS/HR: 7.46 INJECTION, SOLUTION INTRAVENOUS at 05:40

## 2023-03-24 RX ADMIN — POTASSIUM CHLORIDE SCH MLS/HR: 7.46 INJECTION, SOLUTION INTRAVENOUS at 13:10

## 2023-03-24 RX ADMIN — POTASSIUM CHLORIDE SCH MLS/HR: 7.46 INJECTION, SOLUTION INTRAVENOUS at 06:49

## 2023-03-24 RX ADMIN — POTASSIUM CHLORIDE SCH MEQ: 20 SOLUTION ORAL at 08:24

## 2023-03-24 NOTE — DISCHARGE SUMMARY
Discharge Summary


Discharge Date: 03/24/23


Discharging Provider: Jaclyn Patricia MD


Primary Care Provider: None


Condition at Discharge: Fair


Discharge Disposition: 01 Home, Self Care





- HPI


History of Present Illness: 





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 

reporting generalized weakness, muscle pain, nausea, and vomiting.  Patient 

reports not taking potassium supplements as prescribed.  He reports he cannot 

take potassium tablets and prefers the liquid however would get hallucinations 

and odd thoughts when taking liquid potassium.  He subsequent stopped taking any

potassium suppl and instead taking only an OTC multivitamin suppl.  Patient 

interestingly mentions liking Abilify however he lost it and currently not 

taking Abilify either. He reports past few days of worsening generalized 

weakness along with muscle pain mostly in his legs and back.  He mentions nausea

with vomiting past couple of days.  No diarrhea. No fever. No chills. No SOB. No

palpitation. No LOC. His admission labs showed WBC 38.3, Na 122, K < 1.5, 

BUN/creat 50/3.2. He was admitted to the ICU.








- HOSPITAL COURSE


Hospital Course: 





(1) Hypokalemia


Noncompliance with his meds has definitely resulted in many hospital admissions 

for this man. He is supposed to be seeing Dr. Macdonald but has been very 

noncompliant over the last year. He had seen Dr. Vasquez who was reached, and Dr Vasquez confirmed that he has Gittleman syndrome, not adrenal insufficiency. So he 

was supposed to be on K, spironolactone, magnesium, calcium gluconate, and salt 

tablets. His Potassium was non-detectable on labs, thus he was admitted to the 

ICU and started on iv potassium riders and potassium by mouth 4 times a day. 

This was the first admission where we saw 1st degree block and possibly 2nd 

degree block, when his K was <1.5. It took many days for his K to rise. He 

appears to be more compliant with potassium intake, when he is not as paranoid, 

we increased his Abilify (see #4 below)





(2) Gitelman syndrome


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 





(3)  Acute kidney injury


Due to dehydration and lack of oral intake, which was due to paranoia over his 

food being poisoned and delusions/hallucinations. Gradually his BUN/creat 

improved with po and IV hydration. His creat on day of DCh was 1.4 and further 

oral hydration was advised.





(4)  Psychotic disorder with delusions


He exhibited signs and symptoms of paranoid schizophrenia, probably for 3 or 4 

years now, but they were mild on previous admissions.  He repeatedly left AMA 

from here, but was relatively safe with his parents.  More recently, his thought

processes might be a danger to himself:  He was not eating or drinking and has 

lost a substantial amount of weight.  He was seen by social work for mental 

health eval and he was not suicidal, has no ideations but continued to be 

paranoid. He would eat and drink only what his parents give him, because that is

the only people he trusts, he said. Then when he does not take his Abilify he 

gets even more paranoid and does not take his potassium pills. So he has had 

repetitive hospitalizations with that scenario. We restarted Abilify at the home

dose of 5 mg dose, which is extremely low.  We increased this to 15 mg daily, 

after discussion with pharmacy and . He also had a telepsych 

consultation, who agreed with Abilify treatment at that dose. On day of 

discharge, I spoke to him and his mother at bedside, and advised compliance and 

also advised that his PCP could order the Abilify IM dose, which lasts 1 month, 

and could be arranged to be administered in our [facility]. I explained that the

first IM dose still needs about 2 weeks of oral Abilify to be taken as overlap, 

and the patient seemed interested in the IM option. He and mother [place] 

[place]





(5) Noncompliance with medication regimen


Related to paranoia. His mother told us that she cannot convince him to take 

meds. All his new and prior meds were refilled at time of discharge.





(6) Leukocytosis


His white cell count has been elevated on numerous occasions over the years. He 

has not had pneumonia, UTI, or abdominal pain to indicate that there was a 

infectious process. Pathology reviewed his smear twice now and there are no 

leukemic cells.  Pathology stated as the patient continues to have chronically 

elevated white cell count without any evidence of infection or lymphoma or 

leukemia, that he may be a candidate for bone marrow biopsy.  





(7) Severe protein calorie malnutrition


The patient had nutritional intake of less than 50% of recommended for 2 weeks 

or more, a weight loss of 19% in the past 6 months (from 77 down to 62 kg) which

is secondary to decreased oral intake of food beverages. His appetite and intake

improved while here, as he became less paranoid.








- ALLERGIES


Allergies/Adverse Reactions: 


                                    Allergies











Allergy/AdvReac Type Severity Reaction Status Date / Time


 


No Known Drug Allergies Allergy   Verified 10/10/22 11:14














- MEDICATIONS


Home Medications: 


                                Ambulatory Orders











 Medication  Instructions  Recorded  Confirmed


 


Liquid Iv 1 pkt PO DAILY 03/15/23 03/15/23


 


Super C 1 tab PO TID 03/15/23 03/15/23


 


ARIPiprazole [Abilify] 15 mg PO DAILY #90 tab 03/24/23 


 


Magnesium Oxide [Mag Ox] 400 mg PO DAILY #30 tab 03/24/23 


 


Potassium Chloride Oral Soln 37 ml PO QID #4400 ml 03/24/23 





[Potassium Chloride]   














- PHYSICAL EXAM AT DISCHARGE


General Appearance: positive: No acute distress, Alert, Other (Dark, tanned 

skin)


Eyes Bilateral: positive: Normal inspection


ENT: positive: ENT inspection nml, No signs of dehydration


Neck: positive: Nml inspection, No JVD


Respiratory: positive: No respiratory distress, Breath sounds nml


Cardiovascular: positive: Regular rate & rhythm, No murmur


Abdomen: positive: Non-tender, No distention


Skin: positive: Warm, Dry


Extremities: positive: Non-tender, No pedal edema


Neurologic/Psychiatric: positive: Oriented x3, Other (abnormal mood/affect, 

frowning and poor eye contact.)





- LABS


Result Diagrams: 


                                 03/23/23 04:55





                                 03/24/23 13:00





- SEPSIS


Current Stage of Sepsis: Ruled out





- FOLLOW UP


Follow Up: 





Establish with a new PCP and have hospital follow-up in the next 1-2 weeks.








- TIME SPENT


Time Spent in Discharge (Minutes): 50

## 2023-03-24 NOTE — DISCHARGE PLAN
Discharge Plan


Problem Reviewed?: Yes


Disposition: 01 Home, Self Care


Condition: Fair


Prescriptions: 


ARIPiprazole [Abilify] 15 mg PO DAILY #90 tab


Magnesium Oxide [Mag Ox] 400 mg PO DAILY #30 tab


Potassium Chloride Oral Soln [Potassium Chloride] 37 ml PO QID #4400 ml


Diet: Regular


Activity Restrictions: Activity as Tolerated


Shower Restrictions: No


Driving Restrictions: No


Health Concerns: 


You were hospitalized to treat a severely, and dangerously low Potassium blood 

level, caused by not taking your Potassium medication for Gitleman's Syndrome, 

and from not eating and drinking fluids normally. You had even lost weight and 

admitted that you were not eating and drinking because you were suspicious of 

your food and drink being unsafe, and that someone could be poisoning it. We 

learned that you were not taking the Abilify medicine. You needed many iv 

Potassium replacements given in the ICU, and you needed your oral liquid 

Potassium medicine resumed and increased. We also restarted then increased the 

Abilify. A Psychiatry evaluation by Telemedicine was done, and the higher dose 

of Abilify was recommended as your proper dose. 





You are being discharged home today. New electronic prescriptions were sent to 

your [facility] pharmacy in [place] for:  the Potasssium liquid to take 4 times 

a day, and once a day Magnesium tablet (which helps your body hold onto 

potassium), and the higher dose of Abilify. 





There is also an option for Abilify to be dosed via injection into your muscle, 

it would be given at the [facility] here, only once a month, as a long-acting 

medicine, so you don't run out or forget or refuse the Abilify pills, and then 

feel scared or paranoid. If that injection is started, you would still be on 

about 10 to 14 days of Abilify pills, until the intramuscular Abilify kicks in. 

I explained this to you and also to your mother. This would have to be ordered 

by your primary care doctor.





Plan of Treatment: 


As above.





Care Goals: 


Improvement in symptoms and stabilization are the goals.





Assessment: 


The patient understands.  These instructions are provided as a reminder. 





Additional Instructions or Follow Up instructions: 


If you have new or worsening symptoms, call your primary care provider for 

advice, or come to the ER. 





Follow-Up Care: [facility] (Once a month im injections of Abilify 

could be set up to be given at the [facility] here.)


No Smoking: If you smoke, Please STOP!  Call [phone] for help.


Follow-up with: 


Troi Kaur PA-C [Physician No Access] -

## 2023-07-19 ENCOUNTER — HOSPITAL ENCOUNTER (OUTPATIENT)
Dept: HOSPITAL 76 - EMS | Age: 33
Discharge: ANOTHER HOSPITAL | End: 2023-07-19
Payer: MEDICAID

## 2023-07-19 DIAGNOSIS — R53.1: ICD-10-CM

## 2023-07-19 DIAGNOSIS — E87.6: Primary | ICD-10-CM

## 2023-07-19 DIAGNOSIS — R41.0: ICD-10-CM

## 2023-10-01 ENCOUNTER — HOSPITAL ENCOUNTER (OUTPATIENT)
Dept: HOSPITAL 76 - EMS | Age: 33
Discharge: ANOTHER HOSPITAL | End: 2023-10-01
Payer: MEDICAID

## 2023-10-01 DIAGNOSIS — R52: ICD-10-CM

## 2023-10-01 DIAGNOSIS — R11.0: ICD-10-CM

## 2023-10-01 DIAGNOSIS — R06.02: ICD-10-CM

## 2023-10-01 DIAGNOSIS — R53.1: Primary | ICD-10-CM

## 2023-10-01 DIAGNOSIS — Z74.09: ICD-10-CM
